# Patient Record
Sex: MALE | Race: WHITE | NOT HISPANIC OR LATINO | Employment: OTHER | URBAN - METROPOLITAN AREA
[De-identification: names, ages, dates, MRNs, and addresses within clinical notes are randomized per-mention and may not be internally consistent; named-entity substitution may affect disease eponyms.]

---

## 2017-11-27 ENCOUNTER — TELEPHONE (OUTPATIENT)
Dept: PAIN MEDICINE | Facility: CLINIC | Age: 57
End: 2017-11-27

## 2017-12-21 ENCOUNTER — OFFICE VISIT (OUTPATIENT)
Dept: PAIN MEDICINE | Facility: CLINIC | Age: 57
End: 2017-12-21
Payer: MEDICARE

## 2017-12-21 VITALS
TEMPERATURE: 98 F | RESPIRATION RATE: 20 BRPM | WEIGHT: 198.75 LBS | DIASTOLIC BLOOD PRESSURE: 80 MMHG | SYSTOLIC BLOOD PRESSURE: 150 MMHG | HEIGHT: 68 IN | BODY MASS INDEX: 30.12 KG/M2 | HEART RATE: 84 BPM

## 2017-12-21 DIAGNOSIS — Z79.891 OPIOID CONTRACT EXISTS: ICD-10-CM

## 2017-12-21 DIAGNOSIS — M51.36 DDD (DEGENERATIVE DISC DISEASE), LUMBAR: ICD-10-CM

## 2017-12-21 DIAGNOSIS — I10 HYPERTENSION, UNSPECIFIED TYPE: ICD-10-CM

## 2017-12-21 DIAGNOSIS — Z72.0 TOBACCO USE: ICD-10-CM

## 2017-12-21 DIAGNOSIS — M50.30 DDD (DEGENERATIVE DISC DISEASE), CERVICAL: Primary | ICD-10-CM

## 2017-12-21 DIAGNOSIS — M54.12 CERVICAL RADICULOPATHY: ICD-10-CM

## 2017-12-21 PROCEDURE — 80307 DRUG TEST PRSMV CHEM ANLYZR: CPT

## 2017-12-21 PROCEDURE — 99999 PR PBB SHADOW E&M-EST. PATIENT-LVL IV: CPT | Mod: PBBFAC,,, | Performed by: ANESTHESIOLOGY

## 2017-12-21 PROCEDURE — 99204 OFFICE O/P NEW MOD 45 MIN: CPT | Mod: S$GLB,,, | Performed by: ANESTHESIOLOGY

## 2017-12-21 RX ORDER — HYDROCODONE BITARTRATE AND ACETAMINOPHEN 7.5; 325 MG/1; MG/1
1 TABLET ORAL EVERY 12 HOURS PRN
Qty: 60 TABLET | Refills: 0 | Status: SHIPPED | OUTPATIENT
Start: 2017-12-21 | End: 2018-01-19

## 2017-12-21 NOTE — PROGRESS NOTES
This note was completed with dictation software and grammatical errors may exist.    CC: Arm pain, neck pain, back pain    HPI: The patient is a 57-year-old man with hypertension, gout, chronic tobacco use who presents in self-referral for neck pain radiating into the left arm and bilateral low back pain.  He reports having neck pain for many years but it is in the last 6 months to 1 year that the pain has become worse to the point where he has sought consultation with a spine surgeon.  The pain is located in the midline neck, radiates into the scapula and extends throughout the entire left arm and into the hand with numbness, tingling and weakness at times.  He states that this is constant worse with standing and walking worse with turning his head.  He has been taking Neurontin 300 mg 3 times a day without relief.  He has seen Dr. Jalloh, spine surgeon and apparently is scheduled for surgery in February, 2018.  He denies any bowel or bladder incontinence.      His other complaint is low back pain, radiates across the bilateral low back, denies any radiation into the legs.  This is constant worse with walking but even with sitting too long.  He denies any weakness in the legs, no numbness or tingling.  He states that Dr. Jalolh explained that he would likely need to have what sounds like a fusion but is not willing to do this because of his smoking history.     Pain intervention history: He has done physical therapy several times without relief.  The patient had previously been seen by Dr. Alfonso Luciano and had received hydrocodone on a regular basis.  However, when the patient lost his insurance he ended up seeing 2 separate physicians who've charge cash only and was receiving 30 mg oxycodone and Opana in various doses.  Unclear why he is no longer seeing them but financial reasons may be a concern.  Patient states that he rarely leaves the house now.    ROS: He reports headaches, runny nose, appetite  "change, joint stiffness, back pain, difficulty sleeping, anxiety, depression and loss of balance.  Balance of review of systems is negative.    Past Medical History:   Diagnosis Date    Gout     Hyperlipidemia     Hypertension        Past Surgical History:   Procedure Laterality Date    SHOULDER ARTHROSCOPY Right        Social History     Social History    Marital status:      Spouse name: N/A    Number of children: N/A    Years of education: N/A     Social History Main Topics    Smoking status: Current Every Day Smoker     Packs/day: 2.00     Years: 30.00     Types: Cigarettes    Smokeless tobacco: None    Alcohol use Yes      Comment: social    Drug use: No    Sexual activity: Not Asked     Other Topics Concern    None     Social History Narrative    None         Medications/Allergies: See med card    Vitals:    12/21/17 0909   BP: (!) 150/80   Pulse: 84   Resp: 20   Temp: 97.8 °F (36.6 °C)   TempSrc: Oral   Weight: 90.2 kg (198 lb 11.9 oz)   Height: 5' 8" (1.727 m)   PainSc:   8   PainLoc: Arm         Physical exam:  Gen: A and O x3, pleasant, well-groomed, stale tobacco odor  Skin: No rashes or obvious lesions  HEENT: PERRLA, no obvious deformities on ears or in canals.Trachea midline.  CVS: Regular rate and rhythm, normal palpable pulses.  Resp: Clear to auscultation bilaterally, no wheezes or rales.  Abdomen: Soft, NT/ND.  Musculoskeletal:  No antalgic gait.     Neuro:  Upper extremities: 5/5 strength bilaterally   Reflexes: Brachioradialis 1+, Bicep 2+, Tricep 2+.   Sensory: Intact and symmetrical to light touch and pinprick in C2-T1 dermatomes bilaterally, except for left hand hypoesthesia.    Cervical Spine:  Cervical spine: Range of motion is mildly reduced with flexion with increased pain, moderately reduced with extension and oblique extension either side, lateral rotation to either side causing increased pain.    Spurling's maneuver causes neck pain to either side.  Myofascial " exam: No Tenderness to palpation across cervical paraspinous region bilaterally.    Neuro:  Lower extremities: 5/5 strength bilaterally  Reflexes: Patellar 0+, Achilles 0+ bilaterally.  Sensory:  Intact and symmetrical to light touch and pinprick in L2-S1 dermatomes bilaterally.    Lumbar spine:  Lumbar spine: Range of motion is moderately reduced with flexion with increased pain, severely reduced with extension with increased pain bilateral lumbar region.  Zion's test causes no increased pain on either side.    Supine straight leg raise is negative bilaterally.    Internal and external rotation of the hip causes no increased pain on either side.  Myofascial exam: No tenderness to palpation across lumbar paraspinous muscles.      Imagin17 CT Cspine  There multilevel cervical disc bulges and disc herniations.  No significant includes a calcified disc herniation at C5-C6 to the right of midline with severe right C5-C6 foraminal stenosis.  Loss of cervical disc space height at all levels throughout the cervical spine with marginal osteophytes present.  No acute displaced fracture or dislocation noted.  Hypertrophic facet arthrosis at all levels throughout the cervical spine.  A review of the paraspinous soft tissues otherwise demonstrates nothing usual.    MRI report 17: [facility]  At C2/3 there is central disc herniation posteriorly extending 2.8 mm posterior to the margin of the vertebral column.  There is no neural foraminal stenosis.  At C3/4 there is an annular bulge and the AP diameter of the canal is 7.4 mm.  There is bilateral uncinate spurring with moderate narrowing of the neural foramen bilaterally.  At C3/4 there is diffuse disc herniation and protrusion with AP diameter of the canal 7.7 mm.  There is bilateral uncinate spurring with moderate foraminal narrowing bilaterally.  At C5/6 there is a right sided disc herniation resulting in moderate effacement of the right  anterolateral aspect of the thecal sac.  The dimension of the canal is 9.4 mm.  There is bilateral uncinate spurring with severe narrowing of the neural foramen on the right and moderate to severe narrowing of the neural foramen on the left.  At C6/7 there is diffuse disc herniation and protrusion with AP dimension of the central spinal canal 9.4 mm.  There is bilateral uncinate spurring with moderate to severe narrowing of the neural foramen bilaterally.  At C7/T1 there is no focal disc herniation or protrusion.  There is no central spinal canal nor neural foraminal stenosis.    MRI lumbar spine report 3/31/17 [facility]: L1/2 through L3/4 is fairly normal.  At L4/5 demonstrates moderate to severe disc space narrowing, moderate disc bulging, mild foraminal narrowing bilaterally left greater than right with extra foraminal nerve root contact on the left.  At L5/S1 there is moderate severe disc space narrowing, moderate disc bulge, superimposed desiccated chronic appearing central disc protrusion, mild bilateral recess narrowing and contact of the descending S1 nerve roots bilaterally but without visible nerve root impingement extra foraminal nerve root contact bilaterally left greater than right.    Bilateral SI joint MRI 3/31/17: Normal exam.    Assessment:  The patient is a 57-year-old man with hypertension, gout, chronic tobacco use who presents in self-referral for neck pain radiating into the left arm and bilateral low back pain.     1. DDD (degenerative disc disease), cervical  [facility] Drug Screen   2. Opioid contract exists     3. Tobacco use     4. Hypertension, unspecified type     5. DDD (degenerative disc disease), lumbar     6. Cervical radiculopathy         Plan:  1.  He has an upcoming cervical surgery, was told to find a pain management physician to help control pain until his surgery.  The patient does have a history of high-dose opioid usage and I explained my philosophy that we would not  be titrated upward to eliminate his pain, just try to provide him with some medication to tolerate for now.  In terms of medication, I have given him a prescription for hydrocodone 7.5/325 to take up to 2 times a day.  2.  I will get records from his previous physicians that I can see what therapies and treatments have been tried in the past.  Hopefully he has some relief with his neck pain and we can then address some of his back issues.  I explained that the goal would be to decrease medication usage and to increase his functioning.  3.  We had a long discussion about tobacco cessation, explained the risks involved with poor healing and infection after surgery especially a fusion surgery.  I have offered to give him resources to quit, he states that he is going to talk to his primary care physician about this.  4.  I've had him sign an opioid agreement and complete a urine drug screen.  We will have him follow-up in 3 weeks or sooner as needed.

## 2017-12-26 LAB

## 2018-01-09 ENCOUNTER — TELEPHONE (OUTPATIENT)
Dept: PAIN MEDICINE | Facility: CLINIC | Age: 58
End: 2018-01-09

## 2018-01-09 NOTE — TELEPHONE ENCOUNTER
Received a release of information form to be completed from [employer]. They will not accept the release request faxed by [facility] and requested a different form to be completed. This has been mailed to the patient to complete.

## 2018-01-19 ENCOUNTER — OFFICE VISIT (OUTPATIENT)
Dept: PAIN MEDICINE | Facility: CLINIC | Age: 58
End: 2018-01-19
Payer: MEDICARE

## 2018-01-19 VITALS
SYSTOLIC BLOOD PRESSURE: 150 MMHG | RESPIRATION RATE: 20 BRPM | WEIGHT: 199.75 LBS | DIASTOLIC BLOOD PRESSURE: 80 MMHG | TEMPERATURE: 98 F | HEART RATE: 80 BPM | BODY MASS INDEX: 30.37 KG/M2

## 2018-01-19 DIAGNOSIS — M54.16 LEFT LUMBAR RADICULOPATHY: Primary | ICD-10-CM

## 2018-01-19 DIAGNOSIS — M54.12 CERVICAL RADICULOPATHY: ICD-10-CM

## 2018-01-19 DIAGNOSIS — Z72.0 TOBACCO USE: ICD-10-CM

## 2018-01-19 DIAGNOSIS — M51.36 DDD (DEGENERATIVE DISC DISEASE), LUMBAR: ICD-10-CM

## 2018-01-19 DIAGNOSIS — M50.30 DDD (DEGENERATIVE DISC DISEASE), CERVICAL: ICD-10-CM

## 2018-01-19 PROCEDURE — 99999 PR PBB SHADOW E&M-EST. PATIENT-LVL V: CPT | Mod: PBBFAC,,, | Performed by: ANESTHESIOLOGY

## 2018-01-19 PROCEDURE — 99213 OFFICE O/P EST LOW 20 MIN: CPT | Mod: S$GLB,,, | Performed by: ANESTHESIOLOGY

## 2018-01-19 RX ORDER — ALPRAZOLAM 0.5 MG/1
1 TABLET, ORALLY DISINTEGRATING ORAL ONCE AS NEEDED
Status: CANCELLED | OUTPATIENT
Start: 2018-01-31 | End: 2018-01-31

## 2018-01-19 RX ORDER — LIDOCAINE HYDROCHLORIDE 10 MG/ML
1 INJECTION, SOLUTION EPIDURAL; INFILTRATION; INTRACAUDAL; PERINEURAL ONCE
Status: DISCONTINUED | OUTPATIENT
Start: 2018-01-19 | End: 2018-03-29

## 2018-01-19 RX ORDER — HYDROCODONE BITARTRATE AND ACETAMINOPHEN 10; 325 MG/1; MG/1
1 TABLET ORAL 3 TIMES DAILY PRN
Qty: 90 TABLET | Refills: 0 | Status: SHIPPED | OUTPATIENT
Start: 2018-01-19 | End: 2018-02-20 | Stop reason: SDUPTHER

## 2018-01-19 NOTE — PROGRESS NOTES
This note was completed with dictation software and grammatical errors may exist.    CC: Arm pain, neck pain, back pain    HPI: The patient is a 57-year-old man with hypertension, gout, chronic tobacco use who presents in self-referral for neck pain radiating into the left arm and bilateral low back pain.  He returns in follow-up today for neck pain radiating into the left arm, low back pain radiating into the bilateral legs.  We did not obtain records from his previous pain management physician, the medical records release form was not accepted.  He states that he is currently scheduled to undergo cervical spine surgery at the end of February, 2018 with Dr. Jalloh.  He continues to have severe left arm pain that causes him difficulty with holding objects, lifting anything heavy.    His other complaint is low back pain radiating into the bilateral legs.  I was able to open up his MRI from 3/31/17 which does show a disc protrusion at L5/S1 centrally slightly more to the left than the right side, his pain is worse into the left side.  He also has significant facet arthropathy at L3/4, L4/5 and L5/S1.     Pain intervention history: He has done physical therapy several times without relief.  The patient had previously been seen by Dr. Alfonso Luciano and had received hydrocodone on a regular basis.  However, when the patient lost his insurance he ended up seeing 2 separate physicians who've charge cash only and was receiving 30 mg oxycodone and Opana in various doses.  Unclear why he is no longer seeing them but financial reasons may be a concern.  Patient states that he rarely leaves the house now.    ROS: He reports headaches, runny nose, appetite change, joint stiffness, back pain, difficulty sleeping, anxiety, depression and loss of balance.  Balance of review of systems is negative.    Past Medical History:   Diagnosis Date    Gout     Hyperlipidemia     Hypertension        Past Surgical History:   Procedure  Laterality Date    SHOULDER ARTHROSCOPY Right        Social History     Social History    Marital status:      Spouse name: N/A    Number of children: N/A    Years of education: N/A     Social History Main Topics    Smoking status: Current Every Day Smoker     Packs/day: 2.00     Years: 30.00     Types: Cigarettes    Smokeless tobacco: None    Alcohol use Yes      Comment: social    Drug use: No    Sexual activity: Not Asked     Other Topics Concern    None     Social History Narrative    None         Medications/Allergies: See med card    Vitals:    01/19/18 1035   BP: (!) 150/80   Pulse: 80   Resp: 20   Temp: 97.6 °F (36.4 °C)   TempSrc: Oral   Weight: 90.6 kg (199 lb 11.8 oz)   PainSc:   8   PainLoc: Back         Physical exam:  Gen: A and O x3, pleasant, well-groomed, stale tobacco odor  Skin: No rashes or obvious lesions  HEENT: PERRLA, no obvious deformities on ears or in canals.Trachea midline.  CVS: Regular rate and rhythm, normal palpable pulses.  Resp: Clear to auscultation bilaterally, no wheezes or rales.  Abdomen: Soft, NT/ND.  Musculoskeletal:  No antalgic gait.     Neuro:  Upper extremities: 5/5 strength bilaterally   Reflexes: Brachioradialis 1+, Bicep 2+, Tricep 2+.   Sensory: Intact and symmetrical to light touch and pinprick in C2-T1 dermatomes bilaterally, except for left hand hypoesthesia.    Cervical Spine:  Cervical spine: Range of motion is mildly reduced with flexion with increased pain, moderately reduced with extension and oblique extension either side, lateral rotation to either side causing increased pain.    Spurling's maneuver causes neck pain to either side.  Myofascial exam: No Tenderness to palpation across cervical paraspinous region bilaterally.    Neuro:  Lower extremities: 5/5 strength bilaterally  Reflexes: Patellar 0+, Achilles 0+ bilaterally.  Sensory:  Intact and symmetrical to light touch and pinprick in L2-S1 dermatomes bilaterally.    Lumbar  spine:  Lumbar spine: Range of motion is moderately reduced with flexion with increased pain, severely reduced with extension with increased pain bilateral lumbar region.  Zion's test causes no increased pain on either side.    Supine straight leg raise is negative bilaterally.    Internal and external rotation of the hip causes no increased pain on either side.  Myofascial exam: No tenderness to palpation across lumbar paraspinous muscles.      Imagin17 CT Cspine  There multilevel cervical disc bulges and disc herniations.  No significant includes a calcified disc herniation at C5-C6 to the right of midline with severe right C5-C6 foraminal stenosis.  Loss of cervical disc space height at all levels throughout the cervical spine with marginal osteophytes present.  No acute displaced fracture or dislocation noted.  Hypertrophic facet arthrosis at all levels throughout the cervical spine.  A review of the paraspinous soft tissues otherwise demonstrates nothing usual.    MRI report 17: [facility].  At C2/3 there is central disc herniation posteriorly extending 2.8 mm posterior to the margin of the vertebral column.  There is no neural foraminal stenosis.  At C3/4 there is an annular bulge and the AP diameter of the canal is 7.4 mm.  There is bilateral uncinate spurring with moderate narrowing of the neural foramen bilaterally.  At C3/4 there is diffuse disc herniation and protrusion with AP diameter of the canal 7.7 mm.  There is bilateral uncinate spurring with moderate foraminal narrowing bilaterally.  At C5/6 there is a right sided disc herniation resulting in moderate effacement of the right anterolateral aspect of the thecal sac.  The dimension of the canal is 9.4 mm.  There is bilateral uncinate spurring with severe narrowing of the neural foramen on the right and moderate to severe narrowing of the neural foramen on the left.  At C6/7 there is diffuse disc herniation and protrusion  with AP dimension of the central spinal canal 9.4 mm.  There is bilateral uncinate spurring with moderate to severe narrowing of the neural foramen bilaterally.  At C7/T1 there is no focal disc herniation or protrusion.  There is no central spinal canal nor neural foraminal stenosis.    MRI lumbar spine report 3/31/17 [facility]: L1/2 through L3/4 is fairly normal.  At L4/5 demonstrates moderate to severe disc space narrowing, moderate disc bulging, mild foraminal narrowing bilaterally left greater than right with extra foraminal nerve root contact on the left.  At L5/S1 there is moderate severe disc space narrowing, moderate disc bulge, superimposed desiccated chronic appearing central disc protrusion, mild bilateral recess narrowing and contact of the descending S1 nerve roots bilaterally but without visible nerve root impingement extra foraminal nerve root contact bilaterally left greater than right.    Bilateral SI joint MRI 3/31/17: Normal exam.    Assessment:  The patient is a 57-year-old man with hypertension, gout, chronic tobacco use who presents in self-referral for neck pain radiating into the left arm and bilateral low back pain.     1. Left lumbar radiculopathy  Place in Outpatient    Vital signs    Activity as tolerated    Insert peripheral IV    lidocaine (PF) 10 mg/ml (1%) injection 10 mg    Verify informed consent    Notify physician     Notify physician     Notify physician (specify)    Diet NPO    POCT urine pregnancy    POCT glucose    Case Request Operating Room: INJECTION-STEROID-EPIDURAL-LUMBAR L5/S1    alprazolam ODT dissolvable tablet 1 mg    Insert peripheral IV    POCT glucose   2. DDD (degenerative disc disease), cervical     3. DDD (degenerative disc disease), lumbar     4. Cervical radiculopathy     5. Tobacco use         Plan:  1.  For his low back pain radiating into the legs, I think it would be worth trying a midline L5/S1 YARITZA to see if this provides benefit of the back and leg  pain.  I will have him follow-up in several weeks and I explained that there is not much that we can do for his neck right now, he needs to proceed with surgery.  He requested more pain medication, I had given him a prescription for 7.5/325 hydrocodone twice a day in his urine drug screening test was normal.  I will increase his pain medication up to hydrocodone 10/325 3 times daily.  I will have him follow-up in several weeks or sooner as needed.

## 2018-01-24 ENCOUNTER — TELEPHONE (OUTPATIENT)
Dept: PAIN MEDICINE | Facility: CLINIC | Age: 58
End: 2018-01-24

## 2018-01-24 NOTE — TELEPHONE ENCOUNTER
Patient can stop his aspirin for 7 days but he cannot stop plavix until after march 22nd per cardiologist. Please advise

## 2018-01-25 ENCOUNTER — TELEPHONE (OUTPATIENT)
Dept: SURGERY | Facility: HOSPITAL | Age: 58
End: 2018-01-25

## 2018-01-25 DIAGNOSIS — M51.36 DDD (DEGENERATIVE DISC DISEASE), LUMBAR: Primary | ICD-10-CM

## 2018-01-25 NOTE — TELEPHONE ENCOUNTER
Received phone call today from GENNARO Hair MA to reschedule this patient to 3/30/18. Please contact patient to choose another date. The surgery center is closed on 3/30/2018.

## 2018-02-20 ENCOUNTER — OFFICE VISIT (OUTPATIENT)
Dept: PAIN MEDICINE | Facility: CLINIC | Age: 58
End: 2018-02-20
Payer: MEDICARE

## 2018-02-20 ENCOUNTER — TELEPHONE (OUTPATIENT)
Dept: PAIN MEDICINE | Facility: CLINIC | Age: 58
End: 2018-02-20

## 2018-02-20 VITALS
WEIGHT: 202.94 LBS | HEART RATE: 88 BPM | TEMPERATURE: 98 F | RESPIRATION RATE: 20 BRPM | DIASTOLIC BLOOD PRESSURE: 90 MMHG | BODY MASS INDEX: 30.86 KG/M2 | SYSTOLIC BLOOD PRESSURE: 140 MMHG

## 2018-02-20 DIAGNOSIS — R26.81 GAIT INSTABILITY: ICD-10-CM

## 2018-02-20 DIAGNOSIS — M51.36 DDD (DEGENERATIVE DISC DISEASE), LUMBAR: Primary | ICD-10-CM

## 2018-02-20 DIAGNOSIS — M50.30 DDD (DEGENERATIVE DISC DISEASE), CERVICAL: ICD-10-CM

## 2018-02-20 DIAGNOSIS — Z79.891 OPIOID CONTRACT EXISTS: ICD-10-CM

## 2018-02-20 PROCEDURE — 99999 PR PBB SHADOW E&M-EST. PATIENT-LVL IV: CPT | Mod: PBBFAC,,, | Performed by: PHYSICIAN ASSISTANT

## 2018-02-20 PROCEDURE — 80307 DRUG TEST PRSMV CHEM ANLYZR: CPT

## 2018-02-20 PROCEDURE — 99215 OFFICE O/P EST HI 40 MIN: CPT | Mod: S$GLB,,, | Performed by: PHYSICIAN ASSISTANT

## 2018-02-20 PROCEDURE — 3008F BODY MASS INDEX DOCD: CPT | Mod: S$GLB,,, | Performed by: PHYSICIAN ASSISTANT

## 2018-02-20 RX ORDER — GABAPENTIN 300 MG/1
CAPSULE ORAL
Qty: 150 CAPSULE | Refills: 2 | Status: SHIPPED | OUTPATIENT
Start: 2018-02-20 | End: 2018-02-21 | Stop reason: SDUPTHER

## 2018-02-20 RX ORDER — HYDROCODONE BITARTRATE AND ACETAMINOPHEN 10; 325 MG/1; MG/1
1 TABLET ORAL 3 TIMES DAILY PRN
Qty: 90 TABLET | Refills: 0 | Status: SHIPPED | OUTPATIENT
Start: 2018-02-20 | End: 2018-03-22

## 2018-02-20 NOTE — TELEPHONE ENCOUNTER
----- Message from Marie Bonilla sent at 2/20/2018 11:16 AM CST -----  Contact: Patient  Adam, patient [phone] calling because he told office wrong pharmacy to have his prescriptions sent into, wants them sent to [facility] instead of [facility]. Please advise. Thanks.    [facility] [id number] [place] [id number]  Phone: [phone] Fax: [phone]

## 2018-02-20 NOTE — TELEPHONE ENCOUNTER
----- Message from Carlos Caba sent at 2/20/2018  8:26 AM CST -----  Contact: [facility]  Please send clinical notes and medical assistive and signed doctors order for rollator please fax to [phone] any questions please call back at [phone]

## 2018-02-21 RX ORDER — GABAPENTIN 300 MG/1
CAPSULE ORAL
Qty: 150 CAPSULE | Refills: 2 | Status: SHIPPED | OUTPATIENT
Start: 2018-02-21 | End: 2018-06-28

## 2018-02-22 ENCOUNTER — OFFICE VISIT (OUTPATIENT)
Dept: SPINE | Facility: CLINIC | Age: 58
End: 2018-02-22
Payer: MEDICARE

## 2018-02-22 VITALS
BODY MASS INDEX: 29.84 KG/M2 | HEIGHT: 68 IN | WEIGHT: 196.88 LBS | HEART RATE: 72 BPM | SYSTOLIC BLOOD PRESSURE: 144 MMHG | DIASTOLIC BLOOD PRESSURE: 90 MMHG

## 2018-02-22 DIAGNOSIS — M47.892 OTHER OSTEOARTHRITIS OF SPINE, CERVICAL REGION: ICD-10-CM

## 2018-02-22 DIAGNOSIS — M54.2 CERVICALGIA: ICD-10-CM

## 2018-02-22 DIAGNOSIS — M51.36 DEGENERATION OF LUMBAR INTERVERTEBRAL DISC: Primary | ICD-10-CM

## 2018-02-22 DIAGNOSIS — Z72.0 TOBACCO USE: ICD-10-CM

## 2018-02-22 DIAGNOSIS — G89.29 CHRONIC BILATERAL LOW BACK PAIN, WITH SCIATICA PRESENCE UNSPECIFIED: ICD-10-CM

## 2018-02-22 DIAGNOSIS — M54.5 CHRONIC BILATERAL LOW BACK PAIN, WITH SCIATICA PRESENCE UNSPECIFIED: ICD-10-CM

## 2018-02-22 PROCEDURE — 99999 PR PBB SHADOW E&M-EST. PATIENT-LVL V: CPT | Mod: PBBFAC,,, | Performed by: PHYSICIAN ASSISTANT

## 2018-02-22 PROCEDURE — 3008F BODY MASS INDEX DOCD: CPT | Mod: S$GLB,,, | Performed by: PHYSICIAN ASSISTANT

## 2018-02-22 PROCEDURE — 99204 OFFICE O/P NEW MOD 45 MIN: CPT | Mod: S$GLB,,, | Performed by: PHYSICIAN ASSISTANT

## 2018-02-22 NOTE — PROGRESS NOTES
"This note was completed with dictation software and grammatical errors may exist.    CC: Arm pain, neck pain, back pain    HPI: The patient is a 57-year-old man with hypertension, gout, chronic tobacco use who presents in self-referral for neck pain radiating into the left arm and bilateral low back pain.  He returns in follow-up today with low back and neck pain.  The patient is new to me.  He complains of pinching low back pain greater than neck pain.  His low back pain radiates to his legs and his neck pain radiates to his left arm.  His pain is worse with walking, bending and activity.  He has improvement with sitting and pain medication.  He admits to running out of his medicine one week ago.  He states "3 little pills are not enough."  He was planning on having a lumbar YARITZA but this was postponed due to incorrect information about his stents.  He states that his stents were inserted in January and shows me the date on his card.  This was done by Dr. Fabiola Youssef at [facility].  He states that his neck surgery was postponed by Dr. Jalloh for the same reason and he is frustrated with Dr. Jalloh's office and wants to see a neurosurgeon here.  He no longer wants any injections because he reports having a lumbar and cervical YARITZA on 10/16/17 with Dr. Luciano without any relief.  He complains of weakness in his left arm and legs.  He denies any significant numbness, bladder or bowel incontinence.  He also complains of some pain in the left fifth MCP joint for the past month.     Pain intervention history: He has done physical therapy several times without relief.  The patient had previously been seen by Dr. Alfonso Luciano and had received hydrocodone on a regular basis.  However, when the patient lost his insurance he ended up seeing 2 separate physicians who've charge cash only and was receiving 30 mg oxycodone and Opana in various doses.  Unclear why he is no longer seeing them but financial reasons " may be a concern.  Patient states that he rarely leaves the house now.    ROS: He reports headaches, runny nose, appetite change, joint stiffness, back pain, difficulty sleeping, anxiety, depression and loss of balance.  Balance of review of systems is negative.    Past Medical History:   Diagnosis Date    Gout     Hyperlipidemia     Hypertension        Past Surgical History:   Procedure Laterality Date    SHOULDER ARTHROSCOPY Right        Social History     Social History    Marital status:      Spouse name: N/A    Number of children: N/A    Years of education: N/A     Social History Main Topics    Smoking status: Current Every Day Smoker     Packs/day: 2.00     Years: 30.00     Types: Cigarettes    Smokeless tobacco: None    Alcohol use Yes      Comment: social    Drug use: No    Sexual activity: Not Asked     Other Topics Concern    None     Social History Narrative    None         Medications/Allergies: See med card    Vitals:    02/20/18 0950   BP: (!) 140/90   Pulse: 88   Resp: 20   Temp: 97.7 °F (36.5 °C)   TempSrc: Oral   Weight: 92 kg (202 lb 14.9 oz)   PainSc:   8   PainLoc: Back         Physical exam:  Gen: A and O x3, pleasant, well-groomed, stale tobacco odor  Skin: No rashes or obvious lesions  HEENT: PERRLA, no obvious deformities on ears or in canals.Trachea midline.  CVS: Regular rate and rhythm, normal palpable pulses.  Resp: Clear to auscultation bilaterally, no wheezes or rales.  Abdomen: Soft, NT/ND.  Musculoskeletal: Slow cautious gait    Neuro:  Upper extremities: 5/5 strength bilaterally   Lower extremities: 5/5 strength bilaterally  Reflexes: Brachioradialis 1+, Bicep 2+, Tricep 2+. Patellar 0+, Achilles 0+ bilaterally.  Sensory: Intact and symmetrical to light touch and pinprick in C2-T1 dermatomes bilaterally, except for left hand hypoesthesia. Intact and symmetrical to light touch and pinprick in L2-S1 dermatomes bilaterally.    Cervical Spine:  Cervical spine: Range  of motion is mildly reduced with flexion with increased pain, moderately reduced with extension and lateral rotation to either side causing increased pain.    Spurling's maneuver causes neck pain to either side.  Myofascial exam: No Tenderness to palpation across cervical paraspinous region bilaterally.    Lumbar spine:  Lumbar spine: Range of motion is moderately reduced with flexion with increased pain, severely reduced with extension with increased pain bilateral lumbar region.  Zion's test causes no increased pain on either side.    Supine straight leg raise is negative bilaterally.    Internal and external rotation of the hip causes no increased pain on either side.  Myofascial exam: No tenderness to palpation across lumbar paraspinous muscles.      Imagin17 CT Cspine  There multilevel cervical disc bulges and disc herniations.  No significant includes a calcified disc herniation at C5-C6 to the right of midline with severe right C5-C6 foraminal stenosis.  Loss of cervical disc space height at all levels throughout the cervical spine with marginal osteophytes present.  No acute displaced fracture or dislocation noted.  Hypertrophic facet arthrosis at all levels throughout the cervical spine.  A review of the paraspinous soft tissues otherwise demonstrates nothing usual.    MRI report 17: [facility].  At C2/3 there is central disc herniation posteriorly extending 2.8 mm posterior to the margin of the vertebral column.  There is no neural foraminal stenosis.  At C3/4 there is an annular bulge and the AP diameter of the canal is 7.4 mm.  There is bilateral uncinate spurring with moderate narrowing of the neural foramen bilaterally.  At C3/4 there is diffuse disc herniation and protrusion with AP diameter of the canal 7.7 mm.  There is bilateral uncinate spurring with moderate foraminal narrowing bilaterally.  At C5/6 there is a right sided disc herniation resulting in moderate effacement  of the right anterolateral aspect of the thecal sac.  The dimension of the canal is 9.4 mm.  There is bilateral uncinate spurring with severe narrowing of the neural foramen on the right and moderate to severe narrowing of the neural foramen on the left.  At C6/7 there is diffuse disc herniation and protrusion with AP dimension of the central spinal canal 9.4 mm.  There is bilateral uncinate spurring with moderate to severe narrowing of the neural foramen bilaterally.  At C7/T1 there is no focal disc herniation or protrusion.  There is no central spinal canal nor neural foraminal stenosis.    MRI lumbar spine report 3/31/17 [facility] MRI: L1/2 through L3/4 is fairly normal.  At L4/5 demonstrates moderate to severe disc space narrowing, moderate disc bulging, mild foraminal narrowing bilaterally left greater than right with extra foraminal nerve root contact on the left.  At L5/S1 there is moderate severe disc space narrowing, moderate disc bulge, superimposed desiccated chronic appearing central disc protrusion, mild bilateral recess narrowing and contact of the descending S1 nerve roots bilaterally but without visible nerve root impingement extra foraminal nerve root contact bilaterally left greater than right.    Bilateral SI joint MRI 3/31/17: Normal exam.    Assessment:  The patient is a 57-year-old man with hypertension, gout, chronic tobacco use who presents in self-referral for neck pain radiating into the left arm and bilateral low back pain.     1. DDD (degenerative disc disease), lumbar  Ambulatory referral to Neurosurgery   2. DDD (degenerative disc disease), cervical  Ambulatory referral to Neurosurgery   3. Gait instability     4. Opioid contract exists  Pain Clinic Drug Screen       Plan:  1.  I have ordered a rolling walker with a seat for the patient's gait instability to help prevent falls.  2.  He no longer wants to see Dr. Jalloh so I have placed a referral to our neurosurgery department.   He was planning on having cervical spine surgery with Dr. Jalloh and wants to have lumbar surgery as well.  3.  Dr. Paz provided a prescription for hydrocodone-acetaminophen 10/325 mg up to 3 times a day as needed for pain.  A urine drug screen was completed today.  I anticipate this to be negative for the medication because the patient told me he ran out of his medicine about one week ago and has not been taking anything.  I have discussed this case with Dr. Paz and if the patient runs out of his medication again early we will no longer provide controlled substances to the patient.  4.  Follow-up in one month or sooner as needed.    Greater than 50% of this 40 minute visit was spent on counseling the patient.

## 2018-02-22 NOTE — LETTER
February 26, 2018      Bucky Paz MD  [address] [facility] [address] [place] [address]           [facility]  [address] [facility] [address]  [facility] [address]  Phone: [phone]  Fax: [phone]          Patient: Adam Persaud   MR Number: [id number]   YOB: 1960   Date of Visit: 2/22/2018       Dear Dr. Bucky Paz:    Thank you for referring Adam Persaud to me for evaluation. Attached you will find relevant portions of my assessment and plan of care.    If you have questions, please do not hesitate to call me. I look forward to following Adam Persaud along with you.    Sincerely,    Karen Carr PA-C    Enclosure  CC:  No Recipients    If you would like to receive this communication electronically, please contact [email or web address] or [phone] to request more information on [employer] Link access.    For providers and/or their staff who would like to refer a patient to [facility], please contact us through our one-stop-shop provider referral line, [facility] [place], at [phone].    If you feel you have received this communication in error or would no longer like to receive these types of communications, please e-mail [email or web address]

## 2018-02-25 LAB

## 2018-02-26 ENCOUNTER — DOCUMENTATION ONLY (OUTPATIENT)
Dept: PAIN MEDICINE | Facility: CLINIC | Age: 58
End: 2018-02-26

## 2018-02-26 NOTE — PROGRESS NOTES
Neurosurgery History & Physical    Patient ID: Adam Persaud is a 57 y.o. male.    Chief Complaint   Patient presents with    Low-back Pain     Has had pain for years. Pain radiates down back of both legs. Lying down for short periods makes pain better. Bending down makes pain worse.       Review of Systems   Constitutional: Negative for activity change, chills, fatigue and unexpected weight change.   HENT: Negative for hearing loss, tinnitus, trouble swallowing and voice change.    Eyes: Negative for visual disturbance.   Respiratory: Negative for apnea, chest tightness and shortness of breath.    Cardiovascular: Negative for chest pain and palpitations.   Gastrointestinal: Negative for abdominal pain, constipation, diarrhea, nausea and vomiting.   Genitourinary: Negative for difficulty urinating, dysuria and frequency.   Musculoskeletal: Positive for back pain and neck pain. Negative for gait problem and neck stiffness.   Skin: Negative for wound.   Neurological: Positive for numbness. Negative for dizziness, tremors, seizures, facial asymmetry, speech difficulty, weakness, light-headedness and headaches.   Psychiatric/Behavioral: Negative for confusion and decreased concentration.       Past Medical History:   Diagnosis Date    Gout     Hyperlipidemia     Hypertension      Social History     Social History    Marital status:      Spouse name: N/A    Number of children: N/A    Years of education: N/A     Occupational History    Not on file.     Social History Main Topics    Smoking status: Current Every Day Smoker     Packs/day: 2.00     Years: 30.00     Types: Cigarettes    Smokeless tobacco: Not on file    Alcohol use Yes      Comment: social    Drug use: No    Sexual activity: Not on file     Other Topics Concern    Not on file     Social History Narrative    No narrative on file     No family history on file.  Review of patient's allergies indicates:  No Known Allergies    Current  "Outpatient Prescriptions:     allopurinol (ZYLOPRIM) 300 MG tablet, Take 300 mg by mouth once daily., Disp: , Rfl:     aspirin 325 MG tablet, Take 325 mg by mouth once daily., Disp: , Rfl:     clopidogrel (PLAVIX) 75 mg tablet, Take 75 mg by mouth once daily., Disp: , Rfl:     doxepin (SINEQUAN) 10 MG capsule, Take 10 mg by mouth nightly as needed (as needed for pain)., Disp: , Rfl:     gabapentin (NEURONTIN) 300 MG capsule, Take two capsules (600mg) in the morning and three capsules (900 mg) at night., Disp: 150 capsule, Rfl: 2    hydrocodone-acetaminophen 10-325mg (NORCO)  mg Tab, Take 1 tablet by mouth 3 (three) times daily as needed., Disp: 90 tablet, Rfl: 0    indomethacin (INDOCIN) 50 MG capsule, Take 50 mg by mouth 3 (three) times daily., Disp: , Rfl:     lidocaine (LIDODERM) 5 %, Place 1 patch onto the skin once daily., Disp: 10 patch, Rfl: 0    metoprolol succinate (TOPROL-XL) 50 MG 24 hr tablet, Take 50 mg by mouth once daily., Disp: , Rfl:     naloxegol (MOVANTIK) tablet, Take 25 mg by mouth once daily., Disp: , Rfl:     nitroGLYCERIN (NITROSTAT) 0.4 MG SL tablet, Place 0.4 mg under the tongue every 5 (five) minutes as needed for Chest pain., Disp: , Rfl:     pravastatin (PRAVACHOL) 40 MG tablet, Take 40 mg by mouth once daily., Disp: , Rfl:     losartan (COZAAR) 100 MG tablet, Take 1 tablet (100 mg total) by mouth once daily., Disp: 30 tablet, Rfl: 0    Current Facility-Administered Medications:     lidocaine (PF) 10 mg/ml (1%) injection 10 mg, 1 mL, Intradermal, Once, Bucky Paz MD    Vitals:    02/22/18 1336   BP: (!) 144/90   BP Location: Right arm   Patient Position: Sitting   BP Method: Medium (Manual)   Pulse: 72   Weight: 89.3 kg (196 lb 13.9 oz)   Height: 5' 8" (1.727 m)       Physical Exam   Constitutional: He is oriented to person, place, and time. He appears well-developed and well-nourished.   HENT:   Head: Normocephalic and atraumatic.   Eyes: Pupils are equal, " round, and reactive to light.   Neck: Normal range of motion. Neck supple.   Cardiovascular: Normal rate.    Pulmonary/Chest: Effort normal.   Abdominal: He exhibits no distension.   Musculoskeletal: Normal range of motion. He exhibits no edema.   Neurological: He is alert and oriented to person, place, and time. He has a normal Finger-Nose-Finger Test, a normal Heel to Shin Test, a normal Romberg Test and a normal Tandem Gait Test. Gait normal.   Reflex Scores:       Tricep reflexes are 2+ on the right side and 2+ on the left side.       Bicep reflexes are 2+ on the right side and 2+ on the left side.       Brachioradialis reflexes are 2+ on the right side and 2+ on the left side.       Patellar reflexes are 2+ on the right side and 2+ on the left side.       Achilles reflexes are 2+ on the right side and 2+ on the left side.  Skin: Skin is warm and dry.   Psychiatric: He has a normal mood and affect. His speech is normal and behavior is normal. Judgment and thought content normal.   Nursing note and vitals reviewed.      Neurologic Exam     Mental Status   Oriented to person, place, and time.   Oriented to person.   Oriented to place.   Oriented to time.   Follows 3 step commands.   Attention: normal. Concentration: normal.   Speech: speech is normal   Level of consciousness: alert  Knowledge: consistent with education.   Able to name object. Able to read. Able to repeat. Able to write. Normal comprehension.     Cranial Nerves     CN II   Visual acuity: normal  Right visual field deficit: none  Left visual field deficit: none     CN III, IV, VI   Pupils are equal, round, and reactive to light.  Right pupil: Size: 3 mm. Shape: regular. Reactivity: brisk. Consensual response: intact.   Left pupil: Size: 3 mm. Shape: regular. Reactivity: brisk. Consensual response: intact.   CN III: no CN III palsy  CN VI: no CN VI palsy  Nystagmus: none   Diplopia: none  Ophthalmoparesis: none  Conjugate gaze: present    CN V    Right facial sensation deficit: none  Left facial sensation deficit: none    CN VII   Right facial weakness: none  Left facial weakness: none    CN VIII   Hearing: intact    CN IX, X   CN IX normal.   CN X normal.     CN XI   Right sternocleidomastoid strength: normal  Left sternocleidomastoid strength: normal  Right trapezius strength: normal  Left trapezius strength: normal    CN XII   Fasciculations: absent  Tongue deviation: none    Motor Exam   Muscle bulk: normal  Overall muscle tone: normal  Right arm pronator drift: absent  Left arm pronator drift: absent    Strength   Right neck flexion: 5/5  Left neck flexion: 5/5  Right neck extension: 5/5  Left neck extension: 5/5  Right deltoid: 5/5  Left deltoid: 5/5  Right biceps: 5/5  Left biceps: 5/5  Right triceps: 5/5  Left triceps: 5/5  Right wrist flexion: 5/5  Left wrist flexion: 5/5  Right wrist extension: 5/5  Left wrist extension: 5/5  Right interossei: 5/5  Left interossei: 5/5  Right abdominals: 5/5  Left abdominals: 5/5  Right iliopsoas: 5/5  Left iliopsoas: 5/5  Right quadriceps: 5/5  Left quadriceps: 5/5  Right hamstrin/5  Left hamstrin/5  Right glutei: 5/5  Left glutei: 5/5  Right anterior tibial: 5/5  Left anterior tibial: 5/5  Right posterior tibial: 5/5  Left posterior tibial: 5/5  Right peroneal: 5/5  Left peroneal: 5/5  Right gastroc: 5/5  Left gastroc: 5/5    Sensory Exam   Right arm light touch: normal  Left arm light touch: normal  Right leg light touch: normal  Left leg light touch: normal  Right arm vibration: normal  Left arm vibration: normal  Right arm pinprick: normal  Left arm pinprick: normal    Gait, Coordination, and Reflexes     Gait  Gait: normal    Coordination   Romberg: negative  Finger to nose coordination: normal  Heel to shin coordination: normal  Tandem walking coordination: normal    Tremor   Resting tremor: absent  Intention tremor: absent  Action tremor: absent    Reflexes   Right brachioradialis: 2+  Left  brachioradialis: 2+  Right biceps: 2+  Left biceps: 2+  Right triceps: 2+  Left triceps: 2+  Right patellar: 2+  Left patellar: 2+  Right achilles: 2+  Left achilles: 2+  Right Aviles: absent  Left Aviles: absent  Right ankle clonus: absent  Left ankle clonus: absent      Provider dictation:  57 year old male smoker with history of chronic neck/ back pain is referred by Dr. Paz for further evaluation of back pain.  He states he was previously evaluated by Dr. Jalloh ([place]) and was scheduled for surgery today with him, but it was cancelled per the patient due to his cardiologist not giving a correct date of a cardiac procedure.  Patient states he wants a second opinion with a surgeon at this time and would like all of his care to be performed at [facility].    He has chronic neck and lower back pain.  Neck pain has been felt in the interscapular area to the left arm and associated with numbness.  Overall neck pain has improved at this time.  He is most concerned about lower back pain, which he had for years.  It is constant and getting progressively worse.  Pain is felt across the lower back into the bilateral buttocks and posterior thighs when leaning forward, bending or walking.  He denies any further radicular leg pain.  He has not had PT and refuses to consider it.  He is scheduled for YARITZA with Dr. Paz on 3-29-18.  He recalls cervical YARITZA in the past with  Dr. Luciano.  Oswestry score: 58%.  PHQ:  10.    He is neurologically intact on exam.    I have reviewed an MRI cervical spine from [facility] dated 8-24-17 demonstrating multi level cervical spondylosis.  C3/4 broad based disk with mild bilateral foraminal narrowing.  C4/5 broad based disk/ osteophyte with significant right greater than left foraminal narrowing.  C5/6 right foraminal disk with right foraminal narrowing.  And C6/7 broad based disk with bilateral foraminal narrowing.    I have reviewed an MRI lumbar spine from [facility] dated  3-31-17 demonstrating degenerative disk desiccation t L4/5 and L5/S1.  At L5/S1 there is a central/ left paracentral disk hernia with left greater than right lateral recess stenosis.  The study is limited due ot poor image quality overall.    Mr. Persaud has chronic neck and lower back pain.  He has cervical spondylosis at multiple levels that is asymptomatic at this time as cervical spine pain seems to have resolved/ improved.  I have encouraged stretching and good posture to help prevent recurrence of neck pain.  He has chronic lower back pain greater than bilateral posterior thigh pain in a non-focal dermatomal distribution.  He does have central/ left paracentral disk hernia at L5/S1, but without a focal S1 radiculopathy.  He is anxious to consider surgery as he previously scheduled with a  Different surgeon.  Prior to having him see a surgeon here, we will update his imaging and obtain better quality studies - MRI lumbar spine and xrays of the lumbar spin.  He wants to see a surgeon and therefore we will arrange for him to follow up with Dr. Dobson.    Visit Diagnosis:  Degeneration of lumbar intervertebral disc  -     MRI Lumbar Spine Without Contrast; Future; Expected date: 02/22/2018  -     X-Ray Lumbar Complete With Flex And Ext; Future; Expected date: 02/22/2018    Chronic bilateral low back pain, with sciatica presence unspecified  -     MRI Lumbar Spine Without Contrast; Future; Expected date: 02/22/2018  -     X-Ray Lumbar Complete With Flex And Ext; Future; Expected date: 02/22/2018    Cervicalgia    Other osteoarthritis of spine, cervical region    Tobacco use        Total time spent counseling greater than fifty percent of total visit time.  Counseling included discussion regarding imaging findings, diagnosis possibilities, treatment options, risks and benefits.   The patient had many questions regarding the options and long-term effects.

## 2018-03-03 ENCOUNTER — HOSPITAL ENCOUNTER (OUTPATIENT)
Dept: RADIOLOGY | Facility: HOSPITAL | Age: 58
Discharge: HOME | End: 2018-03-03
Attending: PHYSICIAN ASSISTANT
Payer: MEDICARE

## 2018-03-03 DIAGNOSIS — M51.36 DEGENERATION OF LUMBAR INTERVERTEBRAL DISC: ICD-10-CM

## 2018-03-03 DIAGNOSIS — G89.29 CHRONIC BILATERAL LOW BACK PAIN, WITH SCIATICA PRESENCE UNSPECIFIED: ICD-10-CM

## 2018-03-03 DIAGNOSIS — M54.5 CHRONIC BILATERAL LOW BACK PAIN, WITH SCIATICA PRESENCE UNSPECIFIED: ICD-10-CM

## 2018-03-03 PROCEDURE — 72148 MRI LUMBAR SPINE W/O DYE: CPT | Mod: TC,PO

## 2018-03-03 PROCEDURE — 72114 X-RAY EXAM L-S SPINE BENDING: CPT | Mod: TC,FY,PO

## 2018-03-03 PROCEDURE — 72114 X-RAY EXAM L-S SPINE BENDING: CPT | Mod: 26,,, | Performed by: RADIOLOGY

## 2018-03-03 PROCEDURE — 72148 MRI LUMBAR SPINE W/O DYE: CPT | Mod: 26,,, | Performed by: RADIOLOGY

## 2018-03-05 ENCOUNTER — OFFICE VISIT (OUTPATIENT)
Dept: NEUROSURGERY | Facility: CLINIC | Age: 58
End: 2018-03-05
Payer: MEDICARE

## 2018-03-05 VITALS
HEART RATE: 74 BPM | BODY MASS INDEX: 29.7 KG/M2 | HEIGHT: 68 IN | WEIGHT: 196 LBS | SYSTOLIC BLOOD PRESSURE: 119 MMHG | DIASTOLIC BLOOD PRESSURE: 77 MMHG

## 2018-03-05 DIAGNOSIS — M54.50 CHRONIC MIDLINE LOW BACK PAIN WITHOUT SCIATICA: ICD-10-CM

## 2018-03-05 DIAGNOSIS — M54.2 CERVICALGIA: ICD-10-CM

## 2018-03-05 DIAGNOSIS — G89.29 CHRONIC MIDLINE LOW BACK PAIN WITHOUT SCIATICA: ICD-10-CM

## 2018-03-05 DIAGNOSIS — Z72.0 TOBACCO USE: Primary | ICD-10-CM

## 2018-03-05 PROCEDURE — 3074F SYST BP LT 130 MM HG: CPT | Mod: S$GLB,,, | Performed by: NEUROLOGICAL SURGERY

## 2018-03-05 PROCEDURE — 99214 OFFICE O/P EST MOD 30 MIN: CPT | Mod: S$GLB,,, | Performed by: NEUROLOGICAL SURGERY

## 2018-03-05 PROCEDURE — 99999 PR PBB SHADOW E&M-EST. PATIENT-LVL III: CPT | Mod: PBBFAC,,, | Performed by: NEUROLOGICAL SURGERY

## 2018-03-05 PROCEDURE — 3078F DIAST BP <80 MM HG: CPT | Mod: S$GLB,,, | Performed by: NEUROLOGICAL SURGERY

## 2018-03-05 NOTE — PROGRESS NOTES
Neurosurgery History and Physical    Patient ID: Adam Persaud is a 57 y.o. male.    Chief Complaint   Patient presents with    Lumbar Spine Pain (L-Spine)     LBP follow up with MRI and XR. Seen by Karen Carr. Denies changes in symptoms since previous visit.            Review of Systems   Constitutional: Negative.    HENT: Negative.    Eyes: Negative.    Respiratory: Negative.    Cardiovascular: Negative.    Gastrointestinal: Negative.    Endocrine: Negative.    Genitourinary: Negative.    Musculoskeletal: Positive for arthralgias, back pain and neck stiffness.   Skin: Negative.    Allergic/Immunologic: Negative.    Neurological: Positive for numbness. Negative for weakness.   Hematological: Negative.    Psychiatric/Behavioral: Negative.        Past Medical History:   Diagnosis Date    Gout     Hyperlipidemia     Hypertension      Social History     Social History    Marital status:      Spouse name: N/A    Number of children: N/A    Years of education: N/A     Occupational History    Not on file.     Social History Main Topics    Smoking status: Current Every Day Smoker     Packs/day: 2.00     Years: 30.00     Types: Cigarettes    Smokeless tobacco: Not on file    Alcohol use Yes      Comment: social    Drug use: No    Sexual activity: Not on file     Other Topics Concern    Not on file     Social History Narrative    No narrative on file     No family history on file.  Review of patient's allergies indicates:  No Known Allergies    Current Outpatient Prescriptions:     allopurinol (ZYLOPRIM) 300 MG tablet, Take 300 mg by mouth once daily., Disp: , Rfl:     aspirin 325 MG tablet, Take 325 mg by mouth once daily., Disp: , Rfl:     clopidogrel (PLAVIX) 75 mg tablet, Take 75 mg by mouth once daily., Disp: , Rfl:     doxepin (SINEQUAN) 10 MG capsule, Take 10 mg by mouth nightly as needed (as needed for pain)., Disp: , Rfl:     gabapentin (NEURONTIN) 300 MG capsule, Take two capsules  "(600mg) in the morning and three capsules (900 mg) at night., Disp: 150 capsule, Rfl: 2    hydrocodone-acetaminophen 10-325mg (NORCO)  mg Tab, Take 1 tablet by mouth 3 (three) times daily as needed., Disp: 90 tablet, Rfl: 0    indomethacin (INDOCIN) 50 MG capsule, Take 50 mg by mouth 3 (three) times daily., Disp: , Rfl:     lidocaine (LIDODERM) 5 %, Place 1 patch onto the skin once daily., Disp: 10 patch, Rfl: 0    metoprolol succinate (TOPROL-XL) 50 MG 24 hr tablet, Take 50 mg by mouth once daily., Disp: , Rfl:     naloxegol (MOVANTIK) tablet, Take 25 mg by mouth once daily., Disp: , Rfl:     nitroGLYCERIN (NITROSTAT) 0.4 MG SL tablet, Place 0.4 mg under the tongue every 5 (five) minutes as needed for Chest pain., Disp: , Rfl:     pravastatin (PRAVACHOL) 40 MG tablet, Take 40 mg by mouth once daily., Disp: , Rfl:     losartan (COZAAR) 100 MG tablet, Take 1 tablet (100 mg total) by mouth once daily., Disp: 30 tablet, Rfl: 0    Current Facility-Administered Medications:     lidocaine (PF) 10 mg/ml (1%) injection 10 mg, 1 mL, Intradermal, Once, Bucky Paz MD  Blood pressure 119/77, pulse 74, height 5' 8" (1.727 m), weight 88.9 kg (195 lb 15.8 oz).      Neurologic Exam     Mental Status   Oriented to person, place, and time.   Attention: normal. Concentration: normal.   Speech: speech is normal   Level of consciousness: alert  Knowledge: good.     Cranial Nerves     CN II   Visual acuity: normal    CN III, IV, VI   Pupils are equal, round, and reactive to light.  Extraocular motions are normal.     CN V   Facial sensation intact.     CN VII   Facial expression full, symmetric.     CN VIII   Hearing: intact    CN IX, X   Palate: symmetric    CN XI   CN XI normal.     CN XII   CN XII normal.     Motor Exam   Muscle bulk: normal  Overall muscle tone: normal  Right arm pronator drift: absent  Left arm pronator drift: absent    Strength   Right deltoid: 5/5  Left deltoid: 5/5  Right biceps: 5/5  Left " biceps: 5/5  Right triceps: 5/5  Left triceps: 5/5  Right wrist flexion: 5/5  Left wrist flexion: 5/5  Right wrist extension: 5/5  Left wrist extension: 5/5  Right interossei: 5/5  Left interossei: 5/5  Right iliopsoas: 5/5  Left iliopsoas: 5/5  Right quadriceps: 5/5  Left quadriceps: 5/5  Right hamstrin/5  Left hamstrin/5  Right anterior tibial: 5/5  Left anterior tibial: 5/5  Right posterior tibial: 5/5  Left posterior tibial: 5/5  Right peroneal: 5/5  Left peroneal: 5/5  Right gastroc: 5/5  Left gastroc: 5/5    Sensory Exam   Right arm light touch: normal  Right leg light touch: normal  Left leg light touch: normal  Sensory deficit distribution on left: non-dermatomal distribution    Gait, Coordination, and Reflexes     Gait  Gait: normal    Coordination   Romberg: negative  Finger to nose coordination: normal    Tremor   Resting tremor: absent    Reflexes   Right brachioradialis: 1+  Left brachioradialis: 0  Right biceps: 1+  Left biceps: 1+  Right triceps: 1+  Left triceps: 1+  Right patellar: 2+  Left patellar: 2+  Right achilles: 1+  Left achilles: 1+  Right plantar: normal  Left plantar: normal  Right Aviles: absent  Left Aviles: absent  Right ankle clonus: absent  Left ankle clonus: absent      Physical Exam   Constitutional: He is oriented to person, place, and time. He appears well-developed and well-nourished.   HENT:   Head: Normocephalic and atraumatic.   Eyes: EOM are normal. Pupils are equal, round, and reactive to light.   Neck: Normal range of motion. Neck supple.   Cardiovascular: Normal rate and regular rhythm.    Pulmonary/Chest: Effort normal.   Abdominal: Soft.   Musculoskeletal: Normal range of motion.   Neurological: He is alert and oriented to person, place, and time. He has a normal Finger-Nose-Finger Test and a normal Romberg Test. Gait normal.   Reflex Scores:       Tricep reflexes are 1+ on the right side and 1+ on the left side.       Bicep reflexes are 1+ on the right side  "and 1+ on the left side.       Brachioradialis reflexes are 1+ on the right side and 0 on the left side.       Patellar reflexes are 2+ on the right side and 2+ on the left side.       Achilles reflexes are 1+ on the right side and 1+ on the left side.  Skin: Skin is warm and dry.   Psychiatric: He has a normal mood and affect. His speech is normal and behavior is normal. Judgment and thought content normal.   Nursing note and vitals reviewed.      Vital Signs  Pulse: 74  BP: 119/77  Pain Score:   5  Pain Loc: Back  Height and Weight  Height: 5' 8" (172.7 cm)  Weight: 88.9 kg (195 lb 15.8 oz)  BSA (Calculated - sq m): 2.07 sq meters  BMI (Calculated): 29.9  Weight in (lb) to have BMI = 25: 164.1]    Provider dictation:  I reviewed the imaging. He has chronic degenerative disc disease at L4-L5 and L5-S1 There are central disc bulges at these levels but no severe stenosis. In his cervical spine, he has spondylotic foraminal stenosis worse on the right at C5-C6 and C6-C7 bilaterally. There is no soinal cord compression. His pain is primarily in the axial lower lumbar spine with some radiation to his left hip b ut no lower. He has no numbness or weakness in his lower extremities. He has had some neck pain and shoulder pain on and off for years also but his is currently improved. Most of the radiation from his neck pain was on the left side to his shoulder. 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 Lately, he has been experiencing some right shoulder pain. He has no upper extremity weakness.     In summary, he has no symptoms of lumbar radiculopathy and his primary source of pain is his axial low back pain. His cervical spine issues are currently not severe and while he has some significant foraminal stenosis worse on the right and stable compared to a 2015 MRI, his symptoms of alternating but mostly " left shoulder pain are not too strongly suggestive of active radiculopathy. If his neck and shoulder pain flares up again, I recommend cervical YARITZA.     He is a current heavy smoker and has not made any attempts to quit. I have explained to him that he would not be a surgical candidate for fusion until he quit smoking. Furthermore, I have counseled him that quitting smoking will most likely result in improvement of his low back pain. Lastly, I have explained that he had not exhausted non-surgical measures for his axial low back pain. I recommended he consider medial branch blocks/RFA and possibly left SI joint injections. Lastly, if this fails, he would be a candidate for a spinal cord stimulator trial. F/U with Dr Paz.    Visit Diagnosis:  Tobacco use    Chronic midline low back pain without sciatica    Cervicalgia

## 2018-03-21 ENCOUNTER — OFFICE VISIT (OUTPATIENT)
Dept: PAIN MEDICINE | Facility: CLINIC | Age: 58
End: 2018-03-21
Payer: MEDICARE

## 2018-03-21 VITALS
TEMPERATURE: 98 F | HEART RATE: 66 BPM | BODY MASS INDEX: 31.06 KG/M2 | OXYGEN SATURATION: 99 % | WEIGHT: 204.25 LBS | DIASTOLIC BLOOD PRESSURE: 78 MMHG | RESPIRATION RATE: 20 BRPM | SYSTOLIC BLOOD PRESSURE: 135 MMHG

## 2018-03-21 DIAGNOSIS — M50.30 DDD (DEGENERATIVE DISC DISEASE), CERVICAL: ICD-10-CM

## 2018-03-21 DIAGNOSIS — R26.81 GAIT INSTABILITY: ICD-10-CM

## 2018-03-21 DIAGNOSIS — M47.816 LUMBAR SPONDYLOSIS: Primary | ICD-10-CM

## 2018-03-21 DIAGNOSIS — M47.812 SPONDYLOSIS OF CERVICAL REGION WITHOUT MYELOPATHY OR RADICULOPATHY: ICD-10-CM

## 2018-03-21 DIAGNOSIS — M51.36 DDD (DEGENERATIVE DISC DISEASE), LUMBAR: ICD-10-CM

## 2018-03-21 PROCEDURE — 99214 OFFICE O/P EST MOD 30 MIN: CPT | Mod: S$GLB,,, | Performed by: PHYSICIAN ASSISTANT

## 2018-03-21 PROCEDURE — 3075F SYST BP GE 130 - 139MM HG: CPT | Mod: CPTII,S$GLB,, | Performed by: PHYSICIAN ASSISTANT

## 2018-03-21 PROCEDURE — 3078F DIAST BP <80 MM HG: CPT | Mod: CPTII,S$GLB,, | Performed by: PHYSICIAN ASSISTANT

## 2018-03-21 PROCEDURE — 99999 PR PBB SHADOW E&M-EST. PATIENT-LVL IV: CPT | Mod: PBBFAC,,, | Performed by: PHYSICIAN ASSISTANT

## 2018-03-21 RX ORDER — SODIUM CHLORIDE, SODIUM LACTATE, POTASSIUM CHLORIDE, CALCIUM CHLORIDE 600; 310; 30; 20 MG/100ML; MG/100ML; MG/100ML; MG/100ML
INJECTION, SOLUTION INTRAVENOUS CONTINUOUS
Status: CANCELLED | OUTPATIENT
Start: 2018-03-29

## 2018-03-21 NOTE — PROGRESS NOTES
This note was completed with dictation software and grammatical errors may exist.    CC:  neck pain, back pain    HPI: The patient is a 57-year-old man with hypertension, gout, chronic tobacco use who presents in self-referral for neck pain radiating into the left arm and bilateral low back pain.  He returns in follow-up today with neck pain and back pain.  Since his last visit, he had a visit with neurosurgery and an operation was not recommended.  Medial branch blocks were suggested for the lumbar spine and a possible spinal cord stimulator if unsuccessful.  He continues to have bilateral low back pain.  This is much worse with standing and walking, improved with sitting and pain medication.  He also complains of right greater than left neck pain radiating into the right shoulder and into the scapular regions bilaterally.  This is worse with using his arm and at night.  He admits again to overtaking his medication and it sounds like he is out 4-5 days early.  He reports intermittent numbness in his right arm.  He denies weakness, bladder or bowel incontinence.  He was put on Wellbutrin so that he can attempt smoking cessation.     Pain intervention history: He has done physical therapy several times without relief.  The patient had previously been seen by Dr. Alfonso Luciano and had received hydrocodone on a regular basis.  However, when the patient lost his insurance he ended up seeing 2 separate physicians who've charge cash only and was receiving 30 mg oxycodone and Opana in various doses.  Unclear why he is no longer seeing them but financial reasons may be a concern.  Patient states that he rarely leaves the house now.    ROS: He reports headaches, runny nose, appetite change, joint stiffness, back pain, difficulty sleeping, anxiety, depression and loss of balance.  Balance of review of systems is negative.    Past Medical History:   Diagnosis Date    Gout     Hyperlipidemia     Hypertension        Past  Surgical History:   Procedure Laterality Date    SHOULDER ARTHROSCOPY Right        Social History     Social History    Marital status:      Spouse name: N/A    Number of children: N/A    Years of education: N/A     Social History Main Topics    Smoking status: Current Every Day Smoker     Packs/day: 2.00     Years: 30.00     Types: Cigarettes    Smokeless tobacco: None    Alcohol use Yes      Comment: social    Drug use: No    Sexual activity: Not Asked     Other Topics Concern    None     Social History Narrative    None         Medications/Allergies: See med card    Vitals:    03/21/18 0803   BP: 135/78   Pulse: 66   Resp: 20   Temp: 97.6 °F (36.4 °C)   TempSrc: Oral   SpO2: 99%   Weight: 92.7 kg (204 lb 4.1 oz)   PainSc:   6   PainLoc: Back         Physical exam:  Gen: A and O x3, pleasant, well-groomed, stale tobacco odor  Skin: No rashes or obvious lesions  HEENT: PERRLA, no obvious deformities on ears or in canals.Trachea midline.  CVS: Regular rate and rhythm, normal palpable pulses.  Resp: Clear to auscultation bilaterally, no wheezes or rales.  Abdomen: Soft, NT/ND.  Musculoskeletal: Slow cautious gait, using a walker.    Neuro:  Upper extremities: 5/5 strength bilaterally   Lower extremities: 5/5 strength bilaterally  Reflexes: Brachioradialis 1+, Bicep 2+, Tricep 2+. Patellar 0+, Achilles 0+ bilaterally.  Sensory: Intact and symmetrical to light touch and pinprick in C2-T1 dermatomes bilaterally, except for left hand hypoesthesia. Intact and symmetrical to light touch and pinprick in L2-S1 dermatomes bilaterally.    Cervical Spine:  Cervical spine: Range of motion is mildly reduced with flexion with increased pain, moderately reduced with extension and lateral rotation to either side causing increased pain, worse on the right.  Spurling's maneuver causes neck pain to either side.  Myofascial exam: No Tenderness to palpation across cervical paraspinous region bilaterally.    Lumbar  spine:  Lumbar spine: Range of motion is moderately reduced with flexion with increased pain, severely reduced with extension with increased pain bilateral lumbar region.  Zion's test causes no increased pain on either side.    Supine straight leg raise is negative bilaterally.    Internal and external rotation of the hip causes no increased pain on either side.  Myofascial exam: No tenderness to palpation across lumbar paraspinous muscles.      Imagin17 CT Cspine  There multilevel cervical disc bulges and disc herniations.  No significant includes a calcified disc herniation at C5-C6 to the right of midline with severe right C5-C6 foraminal stenosis.  Loss of cervical disc space height at all levels throughout the cervical spine with marginal osteophytes present.  No acute displaced fracture or dislocation noted.  Hypertrophic facet arthrosis at all levels throughout the cervical spine.  A review of the paraspinous soft tissues otherwise demonstrates nothing usual.    MRI report 17: [facility].  At C2/3 there is central disc herniation posteriorly extending 2.8 mm posterior to the margin of the vertebral column.  There is no neural foraminal stenosis.  At C3/4 there is an annular bulge and the AP diameter of the canal is 7.4 mm.  There is bilateral uncinate spurring with moderate narrowing of the neural foramen bilaterally.  At C3/4 there is diffuse disc herniation and protrusion with AP diameter of the canal 7.7 mm.  There is bilateral uncinate spurring with moderate foraminal narrowing bilaterally.  At C5/6 there is a right sided disc herniation resulting in moderate effacement of the right anterolateral aspect of the thecal sac.  The dimension of the canal is 9.4 mm.  There is bilateral uncinate spurring with severe narrowing of the neural foramen on the right and moderate to severe narrowing of the neural foramen on the left.  At C6/7 there is diffuse disc herniation and protrusion  with AP dimension of the central spinal canal 9.4 mm.  There is bilateral uncinate spurring with moderate to severe narrowing of the neural foramen bilaterally.  At C7/T1 there is no focal disc herniation or protrusion.  There is no central spinal canal nor neural foraminal stenosis.    MRI lumbar spine report 3/31/17 [facility] MRI: L1/2 through L3/4 is fairly normal.  At L4/5 demonstrates moderate to severe disc space narrowing, moderate disc bulging, mild foraminal narrowing bilaterally left greater than right with extra foraminal nerve root contact on the left.  At L5/S1 there is moderate severe disc space narrowing, moderate disc bulge, superimposed desiccated chronic appearing central disc protrusion, mild bilateral recess narrowing and contact of the descending S1 nerve roots bilaterally but without visible nerve root impingement extra foraminal nerve root contact bilaterally left greater than right.    Bilateral SI joint MRI 3/31/17: Normal exam.    Assessment:  The patient is a 57-year-old man with hypertension, gout, chronic tobacco use who presents in self-referral for neck pain radiating into the left arm and bilateral low back pain.     1. Lumbar spondylosis     2. DDD (degenerative disc disease), lumbar     3. Spondylosis of cervical region without myelopathy or radiculopathy     4. DDD (degenerative disc disease), cervical     5. Gait instability         Plan:  1.  The patient is currently scheduled for an L5/S1 interlaminar YARITZA but we will change this to diagnostic bilateral L3, 4 and 5 medial branch blocks.  If successful, we will proceed with another set of medial branch blocks before scheduling radiofrequency ablation.  If he does not have relief, we will consider spinal cord stimulation.  2.  The patient again admitted to running out of his pain medication early.  I had explained to him during last visit that if this happens again, we will no longer provide controlled substances.  This has been  discussed with Dr. Paz and we are happy to help the patient with procedures but we will no longer provide controlled substances to him.  3.  For his neck pain, we can consider right C3, 4, 5 and 6 medial branch blocks in the future.  4.  Follow-up in 4 weeks post procedure sooner as needed.      Greater than 50% of this 25 minute visit was spent on counseling the patient.

## 2018-03-28 DIAGNOSIS — M51.36 DDD (DEGENERATIVE DISC DISEASE), LUMBAR: Primary | ICD-10-CM

## 2018-03-28 RX ORDER — BUPROPION HYDROCHLORIDE 150 MG/1
150 TABLET ORAL DAILY
Status: ON HOLD | COMMUNITY
End: 2018-03-29

## 2018-03-29 ENCOUNTER — HOSPITAL ENCOUNTER (OUTPATIENT)
Dept: RADIOLOGY | Facility: HOSPITAL | Age: 58
Discharge: HOME | End: 2018-03-29
Attending: ANESTHESIOLOGY | Admitting: ANESTHESIOLOGY
Payer: MEDICARE

## 2018-03-29 ENCOUNTER — TELEPHONE (OUTPATIENT)
Dept: PAIN MEDICINE | Facility: CLINIC | Age: 58
End: 2018-03-29

## 2018-03-29 ENCOUNTER — SURGERY (OUTPATIENT)
Age: 58
End: 2018-03-29

## 2018-03-29 ENCOUNTER — HOSPITAL ENCOUNTER (OUTPATIENT)
Facility: HOSPITAL | Age: 58
Discharge: HOME | End: 2018-03-29
Attending: ANESTHESIOLOGY | Admitting: ANESTHESIOLOGY
Payer: MEDICARE

## 2018-03-29 VITALS
TEMPERATURE: 98 F | WEIGHT: 186 LBS | DIASTOLIC BLOOD PRESSURE: 95 MMHG | SYSTOLIC BLOOD PRESSURE: 140 MMHG | OXYGEN SATURATION: 98 % | BODY MASS INDEX: 28.19 KG/M2 | HEIGHT: 68 IN | HEART RATE: 68 BPM | RESPIRATION RATE: 16 BRPM

## 2018-03-29 DIAGNOSIS — M51.36 DDD (DEGENERATIVE DISC DISEASE), LUMBAR: ICD-10-CM

## 2018-03-29 DIAGNOSIS — M47.816 LUMBAR SPONDYLOSIS: Primary | ICD-10-CM

## 2018-03-29 PROCEDURE — 64494 INJ PARAVERT F JNT L/S 2 LEV: CPT | Mod: 50,PO | Performed by: ANESTHESIOLOGY

## 2018-03-29 PROCEDURE — 25000003 PHARM REV CODE 250: Mod: PO | Performed by: ANESTHESIOLOGY

## 2018-03-29 PROCEDURE — 25500020 PHARM REV CODE 255: Mod: PO | Performed by: ANESTHESIOLOGY

## 2018-03-29 PROCEDURE — 64494 INJ PARAVERT F JNT L/S 2 LEV: CPT | Mod: 50,,, | Performed by: ANESTHESIOLOGY

## 2018-03-29 PROCEDURE — 63600175 PHARM REV CODE 636 W HCPCS: Mod: PO | Performed by: ANESTHESIOLOGY

## 2018-03-29 PROCEDURE — 76000 FLUOROSCOPY <1 HR PHYS/QHP: CPT | Mod: TC,PO

## 2018-03-29 PROCEDURE — 64493 INJ PARAVERT F JNT L/S 1 LEV: CPT | Mod: 50,PO | Performed by: ANESTHESIOLOGY

## 2018-03-29 PROCEDURE — 64495 INJ PARAVERT F JNT L/S 3 LEV: CPT | Mod: 50,PO | Performed by: ANESTHESIOLOGY

## 2018-03-29 PROCEDURE — 64493 INJ PARAVERT F JNT L/S 1 LEV: CPT | Mod: 50,,, | Performed by: ANESTHESIOLOGY

## 2018-03-29 PROCEDURE — 99152 MOD SED SAME PHYS/QHP 5/>YRS: CPT | Mod: ,,, | Performed by: ANESTHESIOLOGY

## 2018-03-29 RX ORDER — BUPROPION HYDROCHLORIDE 150 MG/1
150 TABLET ORAL DAILY
Status: ON HOLD | COMMUNITY
End: 2018-05-22

## 2018-03-29 RX ORDER — BUPIVACAINE HYDROCHLORIDE 2.5 MG/ML
INJECTION, SOLUTION EPIDURAL; INFILTRATION; INTRACAUDAL
Status: DISCONTINUED | OUTPATIENT
Start: 2018-03-29 | End: 2018-03-29 | Stop reason: HOSPADM

## 2018-03-29 RX ORDER — LIDOCAINE HYDROCHLORIDE 10 MG/ML
INJECTION, SOLUTION EPIDURAL; INFILTRATION; INTRACAUDAL; PERINEURAL
Status: DISCONTINUED | OUTPATIENT
Start: 2018-03-29 | End: 2018-03-29 | Stop reason: HOSPADM

## 2018-03-29 RX ORDER — MIDAZOLAM HYDROCHLORIDE 2 MG/2ML
INJECTION, SOLUTION INTRAMUSCULAR; INTRAVENOUS
Status: DISCONTINUED | OUTPATIENT
Start: 2018-03-29 | End: 2018-03-29 | Stop reason: HOSPADM

## 2018-03-29 RX ORDER — SODIUM CHLORIDE, SODIUM LACTATE, POTASSIUM CHLORIDE, CALCIUM CHLORIDE 600; 310; 30; 20 MG/100ML; MG/100ML; MG/100ML; MG/100ML
INJECTION, SOLUTION INTRAVENOUS CONTINUOUS
Status: DISCONTINUED | OUTPATIENT
Start: 2018-03-29 | End: 2018-03-29 | Stop reason: HOSPADM

## 2018-03-29 RX ADMIN — MIDAZOLAM HYDROCHLORIDE 2 MG: 1 INJECTION, SOLUTION INTRAMUSCULAR; INTRAVENOUS at 04:03

## 2018-03-29 RX ADMIN — BUPIVACAINE HYDROCHLORIDE 6 ML: 2.5 INJECTION, SOLUTION EPIDURAL; INFILTRATION; INTRACAUDAL; PERINEURAL at 04:03

## 2018-03-29 RX ADMIN — LIDOCAINE HYDROCHLORIDE 6 ML: 10 INJECTION, SOLUTION EPIDURAL; INFILTRATION; INTRACAUDAL; PERINEURAL at 04:03

## 2018-03-29 RX ADMIN — IOHEXOL 3 ML: 300 INJECTION, SOLUTION INTRAVENOUS at 04:03

## 2018-03-29 NOTE — H&P (VIEW-ONLY)
This note was completed with dictation software and grammatical errors may exist.    CC:  neck pain, back pain    HPI: The patient is a 57-year-old man with hypertension, gout, chronic tobacco use who presents in self-referral for neck pain radiating into the left arm and bilateral low back pain.  He returns in follow-up today with neck pain and back pain.  Since his last visit, he had a visit with neurosurgery and an operation was not recommended.  Medial branch blocks were suggested for the lumbar spine and a possible spinal cord stimulator if unsuccessful.  He continues to have bilateral low back pain.  This is much worse with standing and walking, improved with sitting and pain medication.  He also complains of right greater than left neck pain radiating into the right shoulder and into the scapular regions bilaterally.  This is worse with using his arm and at night.  He admits again to overtaking his medication and it sounds like he is out 4-5 days early.  He reports intermittent numbness in his right arm.  He denies weakness, bladder or bowel incontinence.  He was put on Wellbutrin so that he can attempt smoking cessation.     Pain intervention history: He has done physical therapy several times without relief.  The patient had previously been seen by Dr. Alfonso Luciano and had received hydrocodone on a regular basis.  However, when the patient lost his insurance he ended up seeing 2 separate physicians who've charge cash only and was receiving 30 mg oxycodone and Opana in various doses.  Unclear why he is no longer seeing them but financial reasons may be a concern.  Patient states that he rarely leaves the house now.    ROS: He reports headaches, runny nose, appetite change, joint stiffness, back pain, difficulty sleeping, anxiety, depression and loss of balance.  Balance of review of systems is negative.    Past Medical History:   Diagnosis Date    Gout     Hyperlipidemia     Hypertension        Past  Surgical History:   Procedure Laterality Date    SHOULDER ARTHROSCOPY Right        Social History     Social History    Marital status:      Spouse name: N/A    Number of children: N/A    Years of education: N/A     Social History Main Topics    Smoking status: Current Every Day Smoker     Packs/day: 2.00     Years: 30.00     Types: Cigarettes    Smokeless tobacco: None    Alcohol use Yes      Comment: social    Drug use: No    Sexual activity: Not Asked     Other Topics Concern    None     Social History Narrative    None         Medications/Allergies: See med card    Vitals:    03/21/18 0803   BP: 135/78   Pulse: 66   Resp: 20   Temp: 97.6 °F (36.4 °C)   TempSrc: Oral   SpO2: 99%   Weight: 92.7 kg (204 lb 4.1 oz)   PainSc:   6   PainLoc: Back         Physical exam:  Gen: A and O x3, pleasant, well-groomed, stale tobacco odor  Skin: No rashes or obvious lesions  HEENT: PERRLA, no obvious deformities on ears or in canals.Trachea midline.  CVS: Regular rate and rhythm, normal palpable pulses.  Resp: Clear to auscultation bilaterally, no wheezes or rales.  Abdomen: Soft, NT/ND.  Musculoskeletal: Slow cautious gait, using a walker.    Neuro:  Upper extremities: 5/5 strength bilaterally   Lower extremities: 5/5 strength bilaterally  Reflexes: Brachioradialis 1+, Bicep 2+, Tricep 2+. Patellar 0+, Achilles 0+ bilaterally.  Sensory: Intact and symmetrical to light touch and pinprick in C2-T1 dermatomes bilaterally, except for left hand hypoesthesia. Intact and symmetrical to light touch and pinprick in L2-S1 dermatomes bilaterally.    Cervical Spine:  Cervical spine: Range of motion is mildly reduced with flexion with increased pain, moderately reduced with extension and lateral rotation to either side causing increased pain, worse on the right.  Spurling's maneuver causes neck pain to either side.  Myofascial exam: No Tenderness to palpation across cervical paraspinous region bilaterally.    Lumbar  spine:  Lumbar spine: Range of motion is moderately reduced with flexion with increased pain, severely reduced with extension with increased pain bilateral lumbar region.  Zion's test causes no increased pain on either side.    Supine straight leg raise is negative bilaterally.    Internal and external rotation of the hip causes no increased pain on either side.  Myofascial exam: No tenderness to palpation across lumbar paraspinous muscles.      Imagin17 CT Cspine  There multilevel cervical disc bulges and disc herniations.  No significant includes a calcified disc herniation at C5-C6 to the right of midline with severe right C5-C6 foraminal stenosis.  Loss of cervical disc space height at all levels throughout the cervical spine with marginal osteophytes present.  No acute displaced fracture or dislocation noted.  Hypertrophic facet arthrosis at all levels throughout the cervical spine.  A review of the paraspinous soft tissues otherwise demonstrates nothing usual.    MRI report 17: [facility]  At C2/3 there is central disc herniation posteriorly extending 2.8 mm posterior to the margin of the vertebral column.  There is no neural foraminal stenosis.  At C3/4 there is an annular bulge and the AP diameter of the canal is 7.4 mm.  There is bilateral uncinate spurring with moderate narrowing of the neural foramen bilaterally.  At C3/4 there is diffuse disc herniation and protrusion with AP diameter of the canal 7.7 mm.  There is bilateral uncinate spurring with moderate foraminal narrowing bilaterally.  At C5/6 there is a right sided disc herniation resulting in moderate effacement of the right anterolateral aspect of the thecal sac.  The dimension of the canal is 9.4 mm.  There is bilateral uncinate spurring with severe narrowing of the neural foramen on the right and moderate to severe narrowing of the neural foramen on the left.  At C6/7 there is diffuse disc herniation and protrusion  with AP dimension of the central spinal canal 9.4 mm.  There is bilateral uncinate spurring with moderate to severe narrowing of the neural foramen bilaterally.  At C7/T1 there is no focal disc herniation or protrusion.  There is no central spinal canal nor neural foraminal stenosis.    MRI lumbar spine report 3/31/17 [facility]: L1/2 through L3/4 is fairly normal.  At L4/5 demonstrates moderate to severe disc space narrowing, moderate disc bulging, mild foraminal narrowing bilaterally left greater than right with extra foraminal nerve root contact on the left.  At L5/S1 there is moderate severe disc space narrowing, moderate disc bulge, superimposed desiccated chronic appearing central disc protrusion, mild bilateral recess narrowing and contact of the descending S1 nerve roots bilaterally but without visible nerve root impingement extra foraminal nerve root contact bilaterally left greater than right.    Bilateral SI joint MRI 3/31/17: Normal exam.    Assessment:  The patient is a 57-year-old man with hypertension, gout, chronic tobacco use who presents in self-referral for neck pain radiating into the left arm and bilateral low back pain.     1. Lumbar spondylosis     2. DDD (degenerative disc disease), lumbar     3. Spondylosis of cervical region without myelopathy or radiculopathy     4. DDD (degenerative disc disease), cervical     5. Gait instability         Plan:  1.  The patient is currently scheduled for an L5/S1 interlaminar YARITZA but we will change this to diagnostic bilateral L3, 4 and 5 medial branch blocks.  If successful, we will proceed with another set of medial branch blocks before scheduling radiofrequency ablation.  If he does not have relief, we will consider spinal cord stimulation.  2.  The patient again admitted to running out of his pain medication early.  I had explained to him during last visit that if this happens again, we will no longer provide controlled substances.  This has been  discussed with Dr. Paz and we are happy to help the patient with procedures but we will no longer provide controlled substances to him.  3.  For his neck pain, we can consider right C3, 4, 5 and 6 medial branch blocks in the future.  4.  Follow-up in 4 weeks post procedure sooner as needed.      Greater than 50% of this 25 minute visit was spent on counseling the patient.

## 2018-03-29 NOTE — DISCHARGE SUMMARY
[facility]  Discharge Note  Short Stay    Admit Date: 3/29/2018    Discharge Date: 3/29/2018    Attending Physician: Bucky Paz MD     Discharge Provider: Bucyk Paz    Diagnoses:  Active Hospital Problems    Diagnosis  POA    *Lumbar spondylosis [M47.816]  Yes      Resolved Hospital Problems    Diagnosis Date Resolved POA   No resolved problems to display.       Discharged Condition: good    Final Diagnoses: Lumbar spondylosis [M47.816]    Disposition: Home or Self Care    Hospital Course: no complications, uneventful    Outcome of Hospitalization, Treatment, Procedure, or Surgery:  Patient was admitted for outpatient procedure. The patient underwent procedure without complications and are discharged home    Follow up/Patient Instructions:  Follow up as scheduled/Patient has received instructions and follow up date    Medications:  Continue previous medications      Discharge Procedure Orders  Call MD for:  temperature >100.4     Call MD for:  severe uncontrolled pain     Call MD for:  redness, tenderness, or signs of infection (pain, swelling, redness, odor or green/yellow discharge around incision site)     Call MD for:  severe persistent headache     No dressing needed           Discharge Procedure Orders (must include Diet, Follow-up, Activity):    Discharge Procedure Orders (must include Diet, Follow-up, Activity)  Call MD for:  temperature >100.4     Call MD for:  severe uncontrolled pain     Call MD for:  redness, tenderness, or signs of infection (pain, swelling, redness, odor or green/yellow discharge around incision site)     Call MD for:  severe persistent headache     No dressing needed

## 2018-03-29 NOTE — TELEPHONE ENCOUNTER
----- Message from Madan Almaarz sent at 3/29/2018  8:14 AM CDT -----  Contact: Adam Fagan to make sure he is approved. He was told by [employer] this morning that he is approved. Please call him to advise whether he should come or not. [phone] (home)   Thanks!

## 2018-03-29 NOTE — DISCHARGE INSTRUCTIONS
Recovery After Procedural Sedation (Adult)  You have been given medicine by vein to make you sleep during your surgery. This may have included both a pain medicine and sleeping medicine. Most of the effects have worn off. But you may still have some drowsiness for the next 6 to 8 hours.  Home care  Follow these guidelines when you get home:  · For the next 8 hours, you should be watched by a responsible adult. This person should make sure your condition is not getting worse.  · Don't drink any alcohol for the next 24 hours.  · Don't drive, operate dangerous machinery, or make important business or personal decisions during the next 24 hours.  Note: Your healthcare provider may tell you not to take any medicine by mouth for pain or sleep in the next 4 hours. These medicines may react with the medicines you were given in the hospital. This could cause a much stronger response than usual.  Follow-up care  Follow up with your healthcare provider if you are not alert and back to your usual level of activity within 12 hours.  When to seek medical advice  Call your healthcare provider right away if any of these occur:  · Drowsiness gets worse  · Weakness or dizziness gets worse  · Repeated vomiting  · You can't be awakened   Date Last Reviewed: 10/18/2016  © 5002-6661 [employer]. [address] [place] [address], [place] [address]. All rights reserved. This information is not intended as a substitute for professional medical care. Always follow your healthcare professional's instructions.      Home care instructions  Apply ice pack to the injection site for 20 minutes periods for the first 24 hrs for soreness/discomfort at injection site DO NOT USE HEAT FOR 24 HOURS  Keep site clean and dry for 24 hours, remove bandaid when desired  Do not drive until tomorrow  Take care when walking after a lumbar injection  Resume regular activities tonight  Pain office will call on Monday   Resume home medication as prescribed  today  Resume Aspirin, Plavix, or Coumadin the day after the procedure unless otherwise instructed.    SEE IMMEDIATE MEDICAL HELP FOR:  Severe increase in your usual pain or appearance of new pain  Prolonged or increasing weakness or numbness in the legs or arms  Drainage, redness, active bleeding, or increased swelling at the injection site  Temperature over 100.0 degrees F.  Headache that increases when your head is upright and decreases when you lie flat    CALL 911 OR GO DIRECTLY TO EMERGENCY DEPARTMENT FOR:  Shortness of breath, chest pain, or problems breathing

## 2018-03-29 NOTE — OP NOTE
PROCEDURE DATE: 3/29/2018    PROCEDURE:  Bilateral L3,4,5 medial branch nerve block on the bilateral-side    DIAGNOSIS:  Lumbar spondylosis    Post Op diagnosis: Same    PHYSICIAN: Bucky Paz MD    MEDICATIONS INJECTED: 0.25% bupivicaine, 1ml at each level    LOCAL ANESTHETIC USED: Lidocaine 1%, 2ml at each level    SEDATION MEDICATIONS:4mg versed    ESTIMATED BLOOD LOSS:  none    COMPLICATIONS:  none    TECHNIQUE: A time out was taken to identify the patient, procedure and side of the procedure. The patient was placed in a prone position, then prepped and draped in the usual sterile fashion using ChloraPrep and sterile towels.  The levels were determined under fluoroscopic guidance and then marked.  Local anesthetic was given by raising a wheal at the skin over each site and then infiltrated approximately 2cm deeper.  A 22-gauge 3.5 inch needle was introduced to the anatomic location of the bilateral L3,4,5 medial branch nerves on the bilateal side.  Appropriate location and medication spread confirmed by injecting 0.5ml of Omnipaque. The above medication was then injected. The patient tolerated the procedure well.     The patient was monitored after the procedure. The patient will be contacted in the next few days to determine extent of relief.  Patient was given post procedure and discharge instructions to follow at home.  The patient was discharged in a stable condition.

## 2018-04-03 ENCOUNTER — TELEPHONE (OUTPATIENT)
Dept: PAIN MEDICINE | Facility: CLINIC | Age: 58
End: 2018-04-03

## 2018-04-03 NOTE — TELEPHONE ENCOUNTER
Let the patient know that we cannot proceed without a percentage.  Try asking him what his pain level was before the injection and what his pain level was after the injection.

## 2018-04-03 NOTE — TELEPHONE ENCOUNTER
----- Message from Ramona Craft sent at 4/3/2018  1:31 PM CDT -----  Contact: self  Patient [phone] had a procedure last week by Dr Bucky Paz and he is calling to let the nurse know how he is doing/please call patient to discuss

## 2018-04-03 NOTE — TELEPHONE ENCOUNTER
Spoke with patient regarding block. He stated this was done so late in the day that he did not go home and do the activities that irritate the pain. He stated the pain did go away and when asked several times a percentage of relief he could not give me one. He also told me this lasted about 4 hours. Please advise on how to proceed.

## 2018-04-04 DIAGNOSIS — M47.816 LUMBAR SPONDYLOSIS: Primary | ICD-10-CM

## 2018-04-04 RX ORDER — SODIUM CHLORIDE, SODIUM LACTATE, POTASSIUM CHLORIDE, CALCIUM CHLORIDE 600; 310; 30; 20 MG/100ML; MG/100ML; MG/100ML; MG/100ML
INJECTION, SOLUTION INTRAVENOUS CONTINUOUS
Status: CANCELLED | OUTPATIENT
Start: 2018-05-01

## 2018-05-01 ENCOUNTER — SURGERY (OUTPATIENT)
Age: 58
End: 2018-05-01

## 2018-05-01 ENCOUNTER — HOSPITAL ENCOUNTER (OUTPATIENT)
Dept: RADIOLOGY | Facility: HOSPITAL | Age: 58
Discharge: HOME | End: 2018-05-01
Attending: ANESTHESIOLOGY | Admitting: ANESTHESIOLOGY
Payer: MEDICARE

## 2018-05-01 ENCOUNTER — HOSPITAL ENCOUNTER (OUTPATIENT)
Facility: HOSPITAL | Age: 58
Discharge: HOME | End: 2018-05-01
Attending: ANESTHESIOLOGY | Admitting: ANESTHESIOLOGY
Payer: MEDICARE

## 2018-05-01 VITALS
SYSTOLIC BLOOD PRESSURE: 167 MMHG | HEIGHT: 68 IN | OXYGEN SATURATION: 98 % | HEART RATE: 88 BPM | TEMPERATURE: 99 F | RESPIRATION RATE: 18 BRPM | WEIGHT: 189 LBS | DIASTOLIC BLOOD PRESSURE: 80 MMHG | BODY MASS INDEX: 28.64 KG/M2

## 2018-05-01 DIAGNOSIS — M54.50 LUMBAR BACK PAIN: ICD-10-CM

## 2018-05-01 DIAGNOSIS — M47.816 LUMBAR SPONDYLOSIS: Primary | ICD-10-CM

## 2018-05-01 PROCEDURE — 25500020 PHARM REV CODE 255: Mod: PO | Performed by: ANESTHESIOLOGY

## 2018-05-01 PROCEDURE — 64494 INJ PARAVERT F JNT L/S 2 LEV: CPT | Mod: 50,PO | Performed by: ANESTHESIOLOGY

## 2018-05-01 PROCEDURE — 64493 INJ PARAVERT F JNT L/S 1 LEV: CPT | Mod: 50,PO | Performed by: ANESTHESIOLOGY

## 2018-05-01 PROCEDURE — 25000003 PHARM REV CODE 250: Mod: PO | Performed by: ANESTHESIOLOGY

## 2018-05-01 PROCEDURE — 99152 MOD SED SAME PHYS/QHP 5/>YRS: CPT | Mod: ,,, | Performed by: ANESTHESIOLOGY

## 2018-05-01 PROCEDURE — 64495 INJ PARAVERT F JNT L/S 3 LEV: CPT | Mod: 50,PO | Performed by: ANESTHESIOLOGY

## 2018-05-01 PROCEDURE — 64494 INJ PARAVERT F JNT L/S 2 LEV: CPT | Mod: 50,,, | Performed by: ANESTHESIOLOGY

## 2018-05-01 PROCEDURE — 76000 FLUOROSCOPY <1 HR PHYS/QHP: CPT | Mod: TC,PO

## 2018-05-01 PROCEDURE — 64493 INJ PARAVERT F JNT L/S 1 LEV: CPT | Mod: 50,,, | Performed by: ANESTHESIOLOGY

## 2018-05-01 RX ORDER — SODIUM CHLORIDE, SODIUM LACTATE, POTASSIUM CHLORIDE, CALCIUM CHLORIDE 600; 310; 30; 20 MG/100ML; MG/100ML; MG/100ML; MG/100ML
INJECTION, SOLUTION INTRAVENOUS CONTINUOUS
Status: DISCONTINUED | OUTPATIENT
Start: 2018-05-01 | End: 2018-05-01

## 2018-05-01 RX ORDER — MIDAZOLAM HYDROCHLORIDE 2 MG/2ML
2 INJECTION, SOLUTION INTRAMUSCULAR; INTRAVENOUS ONCE
Status: DISCONTINUED | OUTPATIENT
Start: 2018-05-01 | End: 2018-05-01 | Stop reason: HOSPADM

## 2018-05-01 RX ORDER — LIDOCAINE HYDROCHLORIDE 10 MG/ML
INJECTION, SOLUTION EPIDURAL; INFILTRATION; INTRACAUDAL; PERINEURAL
Status: DISCONTINUED | OUTPATIENT
Start: 2018-05-01 | End: 2018-05-01 | Stop reason: HOSPADM

## 2018-05-01 RX ORDER — BUPIVACAINE HYDROCHLORIDE 2.5 MG/ML
INJECTION, SOLUTION EPIDURAL; INFILTRATION; INTRACAUDAL
Status: DISCONTINUED | OUTPATIENT
Start: 2018-05-01 | End: 2018-05-01 | Stop reason: HOSPADM

## 2018-05-01 RX ADMIN — IOHEXOL 3 ML: 300 INJECTION, SOLUTION INTRAVENOUS at 04:05

## 2018-05-01 RX ADMIN — LIDOCAINE HYDROCHLORIDE 10 ML: 10 INJECTION, SOLUTION EPIDURAL; INFILTRATION; INTRACAUDAL; PERINEURAL at 04:05

## 2018-05-01 RX ADMIN — BUPIVACAINE HYDROCHLORIDE 8 ML: 2.5 INJECTION, SOLUTION EPIDURAL; INFILTRATION; INTRACAUDAL; PERINEURAL at 04:05

## 2018-05-01 NOTE — H&P
"CC: Back pain    HPI: The patient is a 56yo man with a history of lumbar spondylosis here for bilateral L3,4,5 medial branch block. There are no major changes in history and physical from 3/21/18.    Past Medical History:   Diagnosis Date    Coronary artery disease     stents x2 in heart    Gout     Hyperlipidemia     Hypertension        Past Surgical History:   Procedure Laterality Date    CORONARY ANGIOPLASTY WITH STENT PLACEMENT      X2    index finger amputation Left     MANDIBLE SURGERY      wiring    SHOULDER ARTHROSCOPY Right        History reviewed. No pertinent family history.    Social History     Social History    Marital status:      Spouse name: N/A    Number of children: N/A    Years of education: N/A     Social History Main Topics    Smoking status: Current Every Day Smoker     Packs/day: 2.00     Years: 30.00     Types: Cigarettes    Smokeless tobacco: Never Used    Alcohol use Yes      Comment: social    Drug use: No    Sexual activity: Not Asked     Other Topics Concern    None     Social History Narrative    None       No current facility-administered medications for this encounter.        Review of patient's allergies indicates:  No Known Allergies    Vitals:    05/01/18 1500 05/01/18 1533   BP:  130/82   Pulse:  91   Resp:  18   Temp:  99.1 °F (37.3 °C)   TempSrc:  Skin   SpO2:  95%   Weight: 85.7 kg (189 lb)    Height: 5' 8" (1.727 m)        REVIEW OF SYSTEMS:     GENERAL: No weight loss, malaise or fevers.  HEENT:  No recent changes in vision or hearing  NECK: Negative for lumps, no difficulty with swallowing.  RESPIRATORY: Negative for cough, wheezing or shortness of breath, patient denies any recent URI.  CARDIOVASCULAR: Negative for chest pain, leg swelling or palpitations.  GI: Negative for abdominal discomfort, blood in stools or black stools or change in bowel habits.  MUSCULOSKELETAL: See HPI.  SKIN: Negative for lesions, rash, and itching.  PSYCH: No suicidal " or homicidal ideations, no current mood disturbances.  HEMATOLOGY/LYMPHOLOGY: Negative for prolonged bleeding, bruising easily or swollen nodes. Patient is not currently taking any anti-coagulants  ENDO: No history of diabetes or thyroid dysfunction  NEURO: No history of syncope, paralysis, seizures or tremors.All other reviewed and negative other than HPI.    Physical exam:  Gen: A and O x3, pleasant, well-groomed  Skin: No rashes or obvious lesions  HEENT: PERRLA, no obvious deformities on ears or in canals. No thyroid masses, trachea midline, no palpable lymph nodes in neck, axilla.  CVS: Regular rate and rhythm, normal S1 and S2, no murmurs.  Resp: Clear to auscultation bilaterally.  Abdomen: Soft, NT/ND, normal bowel sounds present.  Musculoskeletal/Neuro: Moving all extremities    Assessment:  Lumbar spondylosis  -     Case Request Operating Room: BLOCK-NERVE-MEDIAL BRANCH-LUMBAR L3,4,5  -     Activity as tolerated; Standing  -     Place in Outpatient; Standing  -     Diet NPO; Standing  -     lactated ringers infusion; Inject into the vein continuous.  -     Notify physician ; Standing  -     Notify physician ; Standing  -     Notify physician (specify); Standing  -     Place 18-22 gauage peripheral IV ; Standing  -     Verify informed consent; Standing  -     Vital signs; Standing

## 2018-05-01 NOTE — DISCHARGE SUMMARY
[facility]  Discharge Note  Short Stay    Admit Date: 5/1/2018    Discharge Date: 5/1/2018    Attending Physician: Bucky Paz MD     Discharge Provider: Bucky Paz    Diagnoses:  Active Hospital Problems    Diagnosis  POA    *Lumbar spondylosis [M47.816]  Yes      Resolved Hospital Problems    Diagnosis Date Resolved POA   No resolved problems to display.       Discharged Condition: good    Final Diagnoses: Lumbar spondylosis [M47.816]    Disposition: Home or Self Care    Hospital Course: no complications, uneventful    Outcome of Hospitalization, Treatment, Procedure, or Surgery:  Patient was admitted for outpatient procedure. The patient underwent procedure without complications and are discharged home    Follow up/Patient Instructions:  Follow up as scheduled/Patient has received instructions and follow up date    Medications:  Continue previous medications      Discharge Procedure Orders  Call MD for:  temperature >100.4     Call MD for:  severe uncontrolled pain     Call MD for:  redness, tenderness, or signs of infection (pain, swelling, redness, odor or green/yellow discharge around incision site)     Call MD for:  severe persistent headache     No dressing needed           Discharge Procedure Orders (must include Diet, Follow-up, Activity):    Discharge Procedure Orders (must include Diet, Follow-up, Activity)  Call MD for:  temperature >100.4     Call MD for:  severe uncontrolled pain     Call MD for:  redness, tenderness, or signs of infection (pain, swelling, redness, odor or green/yellow discharge around incision site)     Call MD for:  severe persistent headache     No dressing needed

## 2018-05-01 NOTE — PLAN OF CARE
Patient tolerating oral liquids without difficulty. No apparent s&s of distress noted at this time, no complaints voiced at this time. Injection site free from redness and drainage.  Discharge instructions reviewed with patient/family/friend with good verbal feedback received. Patient ready for discharge

## 2018-05-01 NOTE — OP NOTE
PROCEDURE DATE: 5/1/2018    PROCEDURE:  Bilateral L3,4,5 medial branch nerve block on the bilateral-side    DIAGNOSIS:  Lumbar spondylosis    Post Op diagnosis: Same    PHYSICIAN: Bucky Paz MD    MEDICATIONS INJECTED: 0.25% bupivicaine, 1ml at each level    LOCAL ANESTHETIC USED: Lidocaine 1%, 2ml at each level    SEDATION MEDICATIONS:4mg versed    ESTIMATED BLOOD LOSS:  none    COMPLICATIONS:  none    TECHNIQUE: A time out was taken to identify the patient, procedure and side of the procedure. The patient was placed in a prone position, then prepped and draped in the usual sterile fashion using ChloraPrep and sterile towels.  The levels were determined under fluoroscopic guidance and then marked.  Local anesthetic was given by raising a wheal at the skin over each site and then infiltrated approximately 2cm deeper.  A 22-gauge 3.5 inch needle was introduced to the anatomic location of the right and then left L3,4,5 medial branch nerves on the bilateral side.  Appropriate location and medication spread confirmed by injecting 0.5ml of Omnipaque. The above medication was then injected. The patient tolerated the procedure well.     The patient was monitored after the procedure. The patient will be contacted in the next few days to determine extent of relief.  Patient was given post procedure and discharge instructions to follow at home.  The patient was discharged in a stable condition.

## 2018-05-01 NOTE — DISCHARGE INSTRUCTIONS
Home care instructions  Apply ice pack to the injection site for 20 minutes periods for the first 24 hrs for soreness/discomfort at injection site DO NOT USE HEAT FOR 24 HOURS  Keep site clean and dry for 24 hours, remove bandaid when desired  Do not drive until tomorrow  Take care when walking after a lumbar injection  Resume home medication as prescribed today  Resume normal activities that cause pain today and Dr. Paz's nurse will call you tomorrow.    SEE IMMEDIATE MEDICAL HELP FOR:  Severe increase in your usual pain or appearance of new pain  Prolonged or increasing weakness or numbness in the legs or arms  Drainage, redness, active bleeding, or increased swelling at the injection site  Temperature over 100.0 degrees F.  Headache that increases when your head is upright and decreases when you lie flat    CALL 911 OR GO DIRECTLY TO EMERGENCY DEPARTMENT FOR:        Recovery After Procedural Sedation (Adult)  You have been given medicine by vein to make you sleep during your surgery. This may have included both a pain medicine and sleeping medicine. Most of the effects have worn off. But you may still have some drowsiness for the next 6 to 8 hours.  Home care  Follow these guidelines when you get home:  · For the next 8 hours, you should be watched by a responsible adult. This person should make sure your condition is not getting worse.  · Don't drink any alcohol for the next 24 hours.  · Don't drive, operate dangerous machinery, or make important business or personal decisions during the next 24 hours.  Note: Your healthcare provider may tell you not to take any medicine by mouth for pain or sleep in the next 4 hours. These medicines may react with the medicines you were given in the hospital. This could cause a much stronger response than usual.  Follow-up care  Follow up with your healthcare provider if you are not alert and back to your usual level of activity within 12 hours.  When to seek medical  advice  Call your healthcare provider right away if any of these occur:  · Drowsiness gets worse  · Weakness or dizziness gets worse  · Repeated vomiting  · You can't be awakened   Date Last Reviewed: 10/18/2016  © 8144-5130 [employer]. [address] [place] [address], [place] [address]. All rights reserved. This information is not intended as a substitute for professional medical care. Always follow your healthcare professional's instructions.        Shortness of breath, chest pain, or problems breathing

## 2018-05-03 ENCOUNTER — TELEPHONE (OUTPATIENT)
Dept: PAIN MEDICINE | Facility: CLINIC | Age: 58
End: 2018-05-03

## 2018-05-03 DIAGNOSIS — M47.816 LUMBAR SPONDYLOSIS: Primary | ICD-10-CM

## 2018-05-03 RX ORDER — SODIUM CHLORIDE, SODIUM LACTATE, POTASSIUM CHLORIDE, CALCIUM CHLORIDE 600; 310; 30; 20 MG/100ML; MG/100ML; MG/100ML; MG/100ML
INJECTION, SOLUTION INTRAVENOUS CONTINUOUS
Status: CANCELLED | OUTPATIENT
Start: 2018-05-22

## 2018-05-03 NOTE — TELEPHONE ENCOUNTER
----- Message from Nery Pearson sent at 5/3/2018  3:18 PM CDT -----  Contact: self [phone]  States that he is returning call. Please call back at [phone]//thank you acc

## 2018-05-03 NOTE — TELEPHONE ENCOUNTER
Spoke with patient. He stated he felt pretty good after block. He stated he received about 80% and this lasted about 4-5 hours. He stated was able to walk a longer distance without pain. RFA scheduled for 5/22 with 4 week follow up. Pre-op instructions reviewed and sent to patient. He also stated he is having pain between his shoulders and stated he cannot go through another 3 month process for this pain. Please advise.

## 2018-05-08 NOTE — TELEPHONE ENCOUNTER
I can continue to help him with procedures but I would not give him any opioid pain medication anymore because he has overused this several times. If he is not satisfied with our care, we will provide him with other area providers.

## 2018-05-09 NOTE — TELEPHONE ENCOUNTER
Spoke with patient regarding this. He understands that we will not be prescribing opioids but he would like something else for the pain. He did state in the past that gabapentin did not work for him. Please advise.

## 2018-05-14 RX ORDER — PREGABALIN 50 MG/1
50 CAPSULE ORAL 3 TIMES DAILY
Qty: 90 CAPSULE | Refills: 1 | Status: SHIPPED | OUTPATIENT
Start: 2018-05-14 | End: 2018-06-28

## 2018-05-14 NOTE — TELEPHONE ENCOUNTER
I will have him try Lyrica, I have started him on 50 mg 3 times daily, have him take this for the next several weeks and if not having any major side effects we can always gradually increase upward.

## 2018-05-18 DIAGNOSIS — M51.36 DDD (DEGENERATIVE DISC DISEASE), LUMBAR: Primary | ICD-10-CM

## 2018-05-22 ENCOUNTER — SURGERY (OUTPATIENT)
Age: 58
End: 2018-05-22

## 2018-05-22 ENCOUNTER — HOSPITAL ENCOUNTER (OUTPATIENT)
Dept: RADIOLOGY | Facility: HOSPITAL | Age: 58
Discharge: HOME | End: 2018-05-22
Attending: ANESTHESIOLOGY | Admitting: ANESTHESIOLOGY
Payer: MEDICARE

## 2018-05-22 ENCOUNTER — HOSPITAL ENCOUNTER (OUTPATIENT)
Facility: HOSPITAL | Age: 58
Discharge: HOME | End: 2018-05-22
Attending: ANESTHESIOLOGY | Admitting: ANESTHESIOLOGY
Payer: MEDICARE

## 2018-05-22 VITALS
SYSTOLIC BLOOD PRESSURE: 157 MMHG | TEMPERATURE: 98 F | HEART RATE: 65 BPM | RESPIRATION RATE: 14 BRPM | OXYGEN SATURATION: 97 % | DIASTOLIC BLOOD PRESSURE: 75 MMHG

## 2018-05-22 DIAGNOSIS — M51.36 DDD (DEGENERATIVE DISC DISEASE), LUMBAR: ICD-10-CM

## 2018-05-22 DIAGNOSIS — M47.816 LUMBAR SPONDYLOSIS: Primary | ICD-10-CM

## 2018-05-22 PROCEDURE — A4216 STERILE WATER/SALINE, 10 ML: HCPCS | Mod: PO | Performed by: ANESTHESIOLOGY

## 2018-05-22 PROCEDURE — 25000003 PHARM REV CODE 250: Mod: PO | Performed by: ANESTHESIOLOGY

## 2018-05-22 PROCEDURE — 63600175 PHARM REV CODE 636 W HCPCS: Mod: PO | Performed by: ANESTHESIOLOGY

## 2018-05-22 PROCEDURE — 76000 FLUOROSCOPY <1 HR PHYS/QHP: CPT | Mod: TC,PO

## 2018-05-22 PROCEDURE — 99152 MOD SED SAME PHYS/QHP 5/>YRS: CPT | Mod: ,,, | Performed by: ANESTHESIOLOGY

## 2018-05-22 PROCEDURE — 64636 DESTROY L/S FACET JNT ADDL: CPT | Mod: 50,PO | Performed by: ANESTHESIOLOGY

## 2018-05-22 PROCEDURE — 64636 DESTROY L/S FACET JNT ADDL: CPT | Mod: 50,,, | Performed by: ANESTHESIOLOGY

## 2018-05-22 PROCEDURE — 64635 DESTROY LUMB/SAC FACET JNT: CPT | Mod: 50,PO | Performed by: ANESTHESIOLOGY

## 2018-05-22 PROCEDURE — 64635 DESTROY LUMB/SAC FACET JNT: CPT | Mod: 50,,, | Performed by: ANESTHESIOLOGY

## 2018-05-22 RX ORDER — LIDOCAINE HYDROCHLORIDE 20 MG/ML
INJECTION, SOLUTION EPIDURAL; INFILTRATION; INTRACAUDAL; PERINEURAL
Status: DISCONTINUED | OUTPATIENT
Start: 2018-05-22 | End: 2018-05-22 | Stop reason: HOSPADM

## 2018-05-22 RX ORDER — FENTANYL CITRATE 50 UG/ML
INJECTION, SOLUTION INTRAMUSCULAR; INTRAVENOUS
Status: DISCONTINUED | OUTPATIENT
Start: 2018-05-22 | End: 2018-05-22 | Stop reason: HOSPADM

## 2018-05-22 RX ORDER — METHYLPREDNISOLONE ACETATE 40 MG/ML
INJECTION, SUSPENSION INTRA-ARTICULAR; INTRALESIONAL; INTRAMUSCULAR; SOFT TISSUE
Status: DISCONTINUED | OUTPATIENT
Start: 2018-05-22 | End: 2018-05-22 | Stop reason: HOSPADM

## 2018-05-22 RX ORDER — MIDAZOLAM HYDROCHLORIDE 2 MG/2ML
INJECTION, SOLUTION INTRAMUSCULAR; INTRAVENOUS
Status: DISCONTINUED | OUTPATIENT
Start: 2018-05-22 | End: 2018-05-22 | Stop reason: HOSPADM

## 2018-05-22 RX ORDER — LIDOCAINE HYDROCHLORIDE 10 MG/ML
INJECTION, SOLUTION EPIDURAL; INFILTRATION; INTRACAUDAL; PERINEURAL
Status: DISCONTINUED | OUTPATIENT
Start: 2018-05-22 | End: 2018-05-22 | Stop reason: HOSPADM

## 2018-05-22 RX ORDER — SODIUM CHLORIDE, SODIUM LACTATE, POTASSIUM CHLORIDE, CALCIUM CHLORIDE 600; 310; 30; 20 MG/100ML; MG/100ML; MG/100ML; MG/100ML
INJECTION, SOLUTION INTRAVENOUS CONTINUOUS
Status: DISCONTINUED | OUTPATIENT
Start: 2018-05-22 | End: 2018-05-22 | Stop reason: HOSPADM

## 2018-05-22 RX ORDER — SODIUM CHLORIDE 9 MG/ML
INJECTION, SOLUTION INTRAMUSCULAR; INTRAVENOUS; SUBCUTANEOUS
Status: DISCONTINUED | OUTPATIENT
Start: 2018-05-22 | End: 2018-05-22 | Stop reason: HOSPADM

## 2018-05-22 RX ADMIN — METHYLPREDNISOLONE ACETATE 40 MG: 40 INJECTION, SUSPENSION INTRA-ARTICULAR; INTRALESIONAL; INTRAMUSCULAR; SOFT TISSUE at 02:05

## 2018-05-22 RX ADMIN — MIDAZOLAM HYDROCHLORIDE 1 MG: 1 INJECTION, SOLUTION INTRAMUSCULAR; INTRAVENOUS at 01:05

## 2018-05-22 RX ADMIN — SODIUM CHLORIDE, SODIUM LACTATE, POTASSIUM CHLORIDE, CALCIUM CHLORIDE: 600; 310; 30; 20 INJECTION, SOLUTION INTRAVENOUS at 01:05

## 2018-05-22 RX ADMIN — LIDOCAINE HYDROCHLORIDE 5 ML: 10 INJECTION, SOLUTION EPIDURAL; INFILTRATION; INTRACAUDAL; PERINEURAL at 02:05

## 2018-05-22 RX ADMIN — MIDAZOLAM HYDROCHLORIDE 2 MG: 1 INJECTION, SOLUTION INTRAMUSCULAR; INTRAVENOUS at 01:05

## 2018-05-22 RX ADMIN — FENTANYL CITRATE 25 MCG: 50 INJECTION, SOLUTION INTRAMUSCULAR; INTRAVENOUS at 02:05

## 2018-05-22 RX ADMIN — LIDOCAINE HYDROCHLORIDE 4 ML: 20 INJECTION, SOLUTION EPIDURAL; INFILTRATION; INTRACAUDAL; PERINEURAL at 02:05

## 2018-05-22 RX ADMIN — SODIUM CHLORIDE 3 ML: 9 INJECTION INTRAMUSCULAR; INTRAVENOUS; SUBCUTANEOUS at 02:05

## 2018-05-22 NOTE — DISCHARGE SUMMARY
[facility]  Discharge Note  Short Stay    Admit Date: 5/22/2018    Discharge Date: 5/22/2018    Attending Physician: Bucky Paz MD     Discharge Provider: Bucky Paz    Diagnoses:  Active Hospital Problems    Diagnosis  POA    *Lumbar spondylosis [M47.816]  Yes      Resolved Hospital Problems    Diagnosis Date Resolved POA   No resolved problems to display.       Discharged Condition: good    Final Diagnoses: Lumbar spondylosis [M47.816]    Disposition: Home or Self Care    Hospital Course: no complications, uneventful    Outcome of Hospitalization, Treatment, Procedure, or Surgery:  Patient was admitted for outpatient procedure. The patient underwent procedure without complications and are discharged home    Follow up/Patient Instructions:  Follow up as scheduled in Pain Management clinic in 3-4 weeks/Patient has received instructions and follow up date and time    Medications:  Continue previous medications      Discharge Procedure Orders  Call MD for:  temperature >100.4     Call MD for:  severe uncontrolled pain     Call MD for:  redness, tenderness, or signs of infection (pain, swelling, redness, odor or green/yellow discharge around incision site)     Call MD for:  severe persistent headache     No dressing needed           Discharge Procedure Orders (must include Diet, Follow-up, Activity):    Discharge Procedure Orders (must include Diet, Follow-up, Activity)  Call MD for:  temperature >100.4     Call MD for:  severe uncontrolled pain     Call MD for:  redness, tenderness, or signs of infection (pain, swelling, redness, odor or green/yellow discharge around incision site)     Call MD for:  severe persistent headache     No dressing needed

## 2018-05-22 NOTE — DISCHARGE INSTRUCTIONS
Home care instructions  Apply ice pack to the injection site for 20 minutes periods for the first 24 hrs for soreness/discomfort at injection site DO NOT USE HEAT FOR 24 HOURS  Keep site clean and dry for 24 hours, remove bandaid when desired  Do not drive until tomorrow  Return to normal activities. Pain clinic will call to see how the block is working.  Make take 2 weeks to feel the full effects   Resume home medication as prescribed today  Resume Aspirin, Plavix, or Coumadin the day after the procedure unless otherwise instructed.    SEE IMMEDIATE MEDICAL HELP FOR:  Severe increase in your usual pain or appearance of new pain  Prolonged or increasing weakness or numbness in the legs or arms  Drainage, redness, active bleeding, or increased swelling at the injection site  Temperature over 100.0 degrees F.  Headache that increases when your head is upright and decreases when you lie flat    CALL 911 OR GO DIRECTLY TO EMERGENCY DEPARTMENT FOR:  Shortness of breath, chest pain, or problems breathing

## 2018-05-22 NOTE — OP NOTE
PROCEDURE DATE: 5/22/2018    PROCEDURE:  Radiofrequency ablation of the bilateral L3,4,5 medial branch nerves on the bilateral-side utilizing fluoroscopy    DIAGNOSIS:  Lumbar spondylosis    Post op Diagnosis: Same    PHYSICIAN: Bucky Paz MD    MEDICATIONS INJECTED:  From a mixture of 4ml of 2% lidocaine and 40mg of methylprednisone, 1ml of this solution was injected at each level.    LOCAL ANESTHETIC USED: Lidocaine 1%, 3 ml given at each site.    SEDATION MEDICATIONS: 4mg versed, 50mcg fentanyl    ESTIMATED BLOOD LOSS:  none    COMPLICATIONS:  none    TECHNIQUE:  A time out was taken to identify patient and procedure side prior to starting the procedure. Laying in a prone position, the patient was prepped and draped in the usual sterile fashion using ChloraPrep and sterile towels.  The levels were determined under fluoroscopic guidance and then marked.  Local anesthetic was given by raising a wheal at the skin over each site and then infiltrated approximately 2cm deeper.  A 20-gauge  100 mm [employer] RF needle was introduced to the anatomic location of the right and then left L3,4,5 medial branch nerves.  Motor stimulation up to 2 Volts at each level confirmed no motor nerve involvement.  Impedance was less than 800 ohms at each level. The above noted medication was then injected slowly.  Ablation was performed per level utilizing Inge radiofrequency generator 80°C for 90 seconds. The patient tolerated the procedure well.     The patient was monitored after the procedure.  Patient was given post procedure and discharge instructions to follow at home.  The patient was discharged in a stable condition

## 2018-05-22 NOTE — H&P
CC: Neck pain    HPI: The patient is a 56yo man with a history of lumbar spondylosis here for bilateral L3,4,5 medial branch RFA. There are no major changes in history and physical from 3/21/18.    Past Medical History:   Diagnosis Date    Arthritis     Coronary artery disease     stents x2 in heart    Gout     Hyperlipidemia     Hypertension        Past Surgical History:   Procedure Laterality Date    CORONARY ANGIOPLASTY WITH STENT PLACEMENT      X2    index finger amputation Left     MANDIBLE SURGERY      wiring    SHOULDER ARTHROSCOPY Right        History reviewed. No pertinent family history.    Social History     Social History    Marital status:      Spouse name: N/A    Number of children: N/A    Years of education: N/A     Social History Main Topics    Smoking status: Current Every Day Smoker     Packs/day: 2.00     Years: 30.00     Types: Cigarettes    Smokeless tobacco: Never Used    Alcohol use No      Comment: social    Drug use: No    Sexual activity: Not Asked     Other Topics Concern    None     Social History Narrative    None       No current facility-administered medications for this encounter.        Review of patient's allergies indicates:  No Known Allergies    Vitals:    05/22/18 1252   BP: 130/85   Pulse: 68   Resp: 14   Temp: 97.7 °F (36.5 °C)   TempSrc: Skin   SpO2: 98%       REVIEW OF SYSTEMS:     GENERAL: No weight loss, malaise or fevers.  HEENT:  No recent changes in vision or hearing  NECK: Negative for lumps, no difficulty with swallowing.  RESPIRATORY: Negative for cough, wheezing or shortness of breath, patient denies any recent URI.  CARDIOVASCULAR: Negative for chest pain, leg swelling or palpitations.  GI: Negative for abdominal discomfort, blood in stools or black stools or change in bowel habits.  MUSCULOSKELETAL: See HPI.  SKIN: Negative for lesions, rash, and itching.  PSYCH: No suicidal or homicidal ideations, no current mood  disturbances.  HEMATOLOGY/LYMPHOLOGY: Negative for prolonged bleeding, bruising easily or swollen nodes. Patient is not currently taking any anti-coagulants  ENDO: No history of diabetes or thyroid dysfunction  NEURO: No history of syncope, paralysis, seizures or tremors.All other reviewed and negative other than HPI.    Physical exam:  Gen: A and O x3, pleasant, well-groomed  Skin: No rashes or obvious lesions  HEENT: PERRLA, no obvious deformities on ears or in canals. No thyroid masses, trachea midline, no palpable lymph nodes in neck, axilla.  CVS: Regular rate and rhythm, normal S1 and S2, no murmurs.  Resp: Clear to auscultation bilaterally.  Abdomen: Soft, NT/ND, normal bowel sounds present.  Musculoskeletal/Neuro: Moving all extremities    Assessment:  Lumbar spondylosis  -     Case Request Operating Room: RADIOFREQUENCY THERMOCOAGULATION (RFTC)-NERVE-MEDIAN BRANCH-LUMBAR L3, L4, L5  -     Activity as tolerated; Standing  -     Place in Outpatient; Standing  -     Diet NPO; Standing  -     lactated ringers infusion; Inject into the vein continuous.  -     Notify physician ; Standing  -     Notify physician ; Standing  -     Notify physician (specify); Standing  -     Place 18-22 gauage peripheral IV ; Standing  -     Verify informed consent; Standing  -     Vital signs; Standing

## 2018-06-01 RX ORDER — GABAPENTIN 300 MG/1
CAPSULE ORAL
Qty: 150 CAPSULE | Refills: 0 | OUTPATIENT
Start: 2018-06-01

## 2018-06-26 ENCOUNTER — TELEPHONE (OUTPATIENT)
Dept: PAIN MEDICINE | Facility: CLINIC | Age: 58
End: 2018-06-26

## 2018-06-26 NOTE — TELEPHONE ENCOUNTER
Patient is coming in on Thursday. He wants to discuss having a steroid injection done. He states that he is having discomfort in his hip when walking. He states that another provider wants to put screws in his hip to help. He doesn't want to have another RFA because he does not like that he had to wait so long to get the procedure. States he wants to discuss pain management options, procedures, medications, or other things to help him. He states the lyrica does not really help and it is expensive.

## 2018-06-26 NOTE — TELEPHONE ENCOUNTER
----- Message from Bridget Cummings sent at 6/26/2018  2:53 PM CDT -----  Please call patient in regards to medications.  Please call [phone].

## 2018-06-26 NOTE — TELEPHONE ENCOUNTER
Patient states that he is in pain and he needs injection. Not asking  For medication just needs something to help with the hip pain, Lyrica not helping.

## 2018-06-26 NOTE — TELEPHONE ENCOUNTER
----- Message from Ramona Craft sent at 6/26/2018  7:48 AM CDT -----  Contact: self  Hamlet Gunter booked out (was sick)/I tried to reschedule to next available which was 07 13 18 per nurse and patient stated he will not be back/he will find another doctor

## 2018-06-28 ENCOUNTER — OFFICE VISIT (OUTPATIENT)
Dept: PAIN MEDICINE | Facility: CLINIC | Age: 58
End: 2018-06-28
Payer: MEDICARE

## 2018-06-28 VITALS
HEART RATE: 57 BPM | TEMPERATURE: 97 F | WEIGHT: 206.38 LBS | SYSTOLIC BLOOD PRESSURE: 154 MMHG | BODY MASS INDEX: 31.38 KG/M2 | OXYGEN SATURATION: 98 % | DIASTOLIC BLOOD PRESSURE: 72 MMHG | RESPIRATION RATE: 20 BRPM

## 2018-06-28 DIAGNOSIS — M47.816 LUMBAR SPONDYLOSIS: ICD-10-CM

## 2018-06-28 DIAGNOSIS — M51.36 DDD (DEGENERATIVE DISC DISEASE), LUMBAR: ICD-10-CM

## 2018-06-28 DIAGNOSIS — M54.12 CERVICAL RADICULOPATHY: Primary | ICD-10-CM

## 2018-06-28 DIAGNOSIS — M50.30 DDD (DEGENERATIVE DISC DISEASE), CERVICAL: ICD-10-CM

## 2018-06-28 DIAGNOSIS — M19.049 HAND ARTHRITIS: ICD-10-CM

## 2018-06-28 PROCEDURE — 3077F SYST BP >= 140 MM HG: CPT | Mod: CPTII,S$GLB,, | Performed by: PHYSICIAN ASSISTANT

## 2018-06-28 PROCEDURE — 99213 OFFICE O/P EST LOW 20 MIN: CPT | Mod: S$GLB,,, | Performed by: PHYSICIAN ASSISTANT

## 2018-06-28 PROCEDURE — 3008F BODY MASS INDEX DOCD: CPT | Mod: CPTII,S$GLB,, | Performed by: PHYSICIAN ASSISTANT

## 2018-06-28 PROCEDURE — 3078F DIAST BP <80 MM HG: CPT | Mod: CPTII,S$GLB,, | Performed by: PHYSICIAN ASSISTANT

## 2018-06-28 PROCEDURE — 99999 PR PBB SHADOW E&M-EST. PATIENT-LVL III: CPT | Mod: PBBFAC,,, | Performed by: PHYSICIAN ASSISTANT

## 2018-06-28 RX ORDER — DICLOFENAC SODIUM 10 MG/G
2 GEL TOPICAL 2 TIMES DAILY PRN
Qty: 1 TUBE | Refills: 5 | Status: ON HOLD | OUTPATIENT
Start: 2018-06-28 | End: 2018-07-10 | Stop reason: CLARIF

## 2018-06-28 RX ORDER — SODIUM CHLORIDE, SODIUM LACTATE, POTASSIUM CHLORIDE, CALCIUM CHLORIDE 600; 310; 30; 20 MG/100ML; MG/100ML; MG/100ML; MG/100ML
INJECTION, SOLUTION INTRAVENOUS CONTINUOUS
Status: CANCELLED | OUTPATIENT
Start: 2018-07-10

## 2018-06-28 NOTE — PROGRESS NOTES
This note was completed with dictation software and grammatical errors may exist.    CC:  neck pain, back pain    HPI: The patient is a 57-year-old man with hypertension, gout, chronic tobacco use who presents in self-referral for neck pain radiating into the left arm and bilateral low back pain.  He is status post bilateral L3, 4 and 5 medial branch radiofrequency ablation on 5/22/18 with what sounds like 100% relief of his right low back pain and moderate relief of his left low back pain.  He complains of intermittent left low back pain that he describes as grinding, worse with walking and improved with sitting.  His main complaint is neck pain radiating to his left arm and in between his shoulder blades.  This is fairly constant.  He stopped taking Lyrica because it caused blurred vision and did not significantly help.  He complains of numbness in his left arm.  He complains of weakness in his legs.  He denies bladder or bowel incontinence.     Pain intervention history: He has done physical therapy several times without relief.  The patient had previously been seen by Dr. Alfonso Luciano and had received hydrocodone on a regular basis.  However, when the patient lost his insurance he ended up seeing 2 separate physicians who've charge cash only and was receiving 30 mg oxycodone and Opana in various doses.  Unclear why he is no longer seeing them but financial reasons may be a concern.  Patient states that he rarely leaves the house now.  He is status post bilateral L3, 4 and 5 medial branch radiofrequency ablation on 5/22/18 with what sounds like 100% relief of his right low back pain and moderate relief of his left low back pain.    ROS: He reports headaches, runny nose, appetite change, joint stiffness, back pain, difficulty sleeping, anxiety, depression and loss of balance.  Balance of review of systems is negative.    Past Medical History:   Diagnosis Date    Arthritis     Coronary artery disease     stents  x2 in heart    Gout     Hyperlipidemia     Hypertension        Past Surgical History:   Procedure Laterality Date    CORONARY ANGIOPLASTY WITH STENT PLACEMENT      X2    index finger amputation Left     MANDIBLE SURGERY      wiring    RADIOFREQUENCY ABLATION OF LUMBAR MEDIAL BRANCH NERVE AT SINGLE LEVEL Bilateral 5/22/2018    Procedure: RADIOFREQUENCY THERMOCOAGULATION (RFTC)-NERVE-MEDIAN BRANCH-LUMBAR L3, L4, L5;  Surgeon: Bucky Paz MD;  Location: [facility] OR;  Service: Pain Management;  Laterality: Bilateral;    SHOULDER ARTHROSCOPY Right        Social History     Social History    Marital status:      Spouse name: N/A    Number of children: N/A    Years of education: N/A     Social History Main Topics    Smoking status: Current Every Day Smoker     Packs/day: 2.00     Years: 30.00     Types: Cigarettes    Smokeless tobacco: Never Used    Alcohol use Yes      Comment: social    Drug use: No    Sexual activity: Not Asked     Other Topics Concern    None     Social History Narrative    None         Medications/Allergies: See med card    Vitals:    06/28/18 0910   BP: (!) 154/72   Pulse: (!) 57   Resp: 20   Temp: 97.2 °F (36.2 °C)   TempSrc: Oral   SpO2: 98%   Weight: 93.6 kg (206 lb 5.6 oz)   PainSc:   6   PainLoc: Hip         Physical exam:  Gen: A and O x3, pleasant, well-groomed, stale tobacco odor  Skin: No rashes or obvious lesions  HEENT: PERRLA, no obvious deformities on ears or in canals.Trachea midline.  CVS: Regular rate and rhythm, normal palpable pulses.  Resp: Clear to auscultation bilaterally, no wheezes or rales.  Abdomen: Soft, NT/ND.  Musculoskeletal: Slow cautious gait, using a walker.    Neuro:  Upper extremities: 5/5 strength bilaterally   Lower extremities: 5/5 strength bilaterally  Reflexes: Brachioradialis 1+, Bicep 2+, Tricep 2+. Patellar 0+, Achilles 0+ bilaterally.  Sensory: Intact and symmetrical to light touch and pinprick in C2-T1 dermatomes bilaterally,  except for left hand hypoesthesia. Intact and symmetrical to light touch and pinprick in L2-S1 dermatomes bilaterally.    Cervical Spine:  Cervical spine: Range of motion is mildly reduced with flexion with increased pain, moderately reduced with extension and lateral rotation to either side causing increased pain, worse on the left.  Spurling's maneuver causes neck pain to either side.  Myofascial exam: No Tenderness to palpation across cervical paraspinous region bilaterally.    Lumbar spine:  Lumbar spine: Range of motion is moderately reduced with flexion with Increased left low back pain.  Range of motion is severely reduced with extension with increased left low back pain.  Zion's test causes no increased pain on either side.    Supine straight leg raise is negative bilaterally.    Internal and external rotation of the hip causes no increased pain on either side.  Myofascial exam: No tenderness to palpation across lumbar paraspinous muscles.      Imagin17 CT Cspine  There multilevel cervical disc bulges and disc herniations.  No significant includes a calcified disc herniation at C5-C6 to the right of midline with severe right C5-C6 foraminal stenosis.  Loss of cervical disc space height at all levels throughout the cervical spine with marginal osteophytes present.  No acute displaced fracture or dislocation noted.  Hypertrophic facet arthrosis at all levels throughout the cervical spine.  A review of the paraspinous soft tissues otherwise demonstrates nothing usual.    MRI report 17: [facility].  At C2/3 there is central disc herniation posteriorly extending 2.8 mm posterior to the margin of the vertebral column.  There is no neural foraminal stenosis.  At C3/4 there is an annular bulge and the AP diameter of the canal is 7.4 mm.  There is bilateral uncinate spurring with moderate narrowing of the neural foramen bilaterally.  At C3/4 there is diffuse disc herniation and protrusion  with AP diameter of the canal 7.7 mm.  There is bilateral uncinate spurring with moderate foraminal narrowing bilaterally.  At C5/6 there is a right sided disc herniation resulting in moderate effacement of the right anterolateral aspect of the thecal sac.  The dimension of the canal is 9.4 mm.  There is bilateral uncinate spurring with severe narrowing of the neural foramen on the right and moderate to severe narrowing of the neural foramen on the left.  At C6/7 there is diffuse disc herniation and protrusion with AP dimension of the central spinal canal 9.4 mm.  There is bilateral uncinate spurring with moderate to severe narrowing of the neural foramen bilaterally.  At C7/T1 there is no focal disc herniation or protrusion.  There is no central spinal canal nor neural foraminal stenosis.    MRI lumbar spine report 3/31/17 [place] MRI: L1/2 through L3/4 is fairly normal.  At L4/5 demonstrates moderate to severe disc space narrowing, moderate disc bulging, mild foraminal narrowing bilaterally left greater than right with extra foraminal nerve root contact on the left.  At L5/S1 there is moderate severe disc space narrowing, moderate disc bulge, superimposed desiccated chronic appearing central disc protrusion, mild bilateral recess narrowing and contact of the descending S1 nerve roots bilaterally but without visible nerve root impingement extra foraminal nerve root contact bilaterally left greater than right.    Bilateral SI joint MRI 3/31/17: Normal exam.    Assessment:  The patient is a 57-year-old man with hypertension, gout, chronic tobacco use who presents in self-referral for neck pain radiating into the left arm and bilateral low back pain.     1. Cervical radiculopathy     2. DDD (degenerative disc disease), cervical     3. Lumbar spondylosis     4. DDD (degenerative disc disease), lumbar     5. Hand arthritis         Plan:  1.  The patient is no longer complaining of right low back pain following the  lumbar RFA but continues to have some left low back pain.  However, this is only present with walking for an extended period of time.  His main complaint today is neck pain radiating in between the shoulder blades and left arm pain.  I will schedule him for a cervical YARITZA.  He reports having this done last year with his previous pain management doctor but the dose of steroid was split between the cervical and lumbar spine.  We can repeat injection if he has some relief but not full.  2.  I provided a prescription for diclofenac gel for his arthritic hand pain.  3.  Follow-up in 4 weeks post procedure or sooner as needed.

## 2018-06-29 ENCOUNTER — TELEPHONE (OUTPATIENT)
Dept: PAIN MEDICINE | Facility: CLINIC | Age: 58
End: 2018-06-29

## 2018-06-29 NOTE — TELEPHONE ENCOUNTER
----- Message from Madan Almaraz sent at 6/29/2018 12:21 PM CDT -----  Contact: Adam   Calling because his pain medication is not covered by [employer]. He has much to discuss. He is only wanting to see Jackson going forward. Please call him [phone] (home)   Thanks!

## 2018-07-03 DIAGNOSIS — M50.30 DDD (DEGENERATIVE DISC DISEASE), CERVICAL: Primary | ICD-10-CM

## 2018-07-10 ENCOUNTER — HOSPITAL ENCOUNTER (OUTPATIENT)
Dept: RADIOLOGY | Facility: HOSPITAL | Age: 58
Discharge: HOME | End: 2018-07-10
Attending: PAIN MEDICINE | Admitting: PAIN MEDICINE
Payer: MEDICARE

## 2018-07-10 ENCOUNTER — HOSPITAL ENCOUNTER (OUTPATIENT)
Facility: HOSPITAL | Age: 58
Discharge: HOME | End: 2018-07-10
Attending: PAIN MEDICINE | Admitting: PAIN MEDICINE
Payer: MEDICARE

## 2018-07-10 ENCOUNTER — SURGERY (OUTPATIENT)
Age: 58
End: 2018-07-10

## 2018-07-10 VITALS
BODY MASS INDEX: 28.64 KG/M2 | OXYGEN SATURATION: 98 % | DIASTOLIC BLOOD PRESSURE: 80 MMHG | HEIGHT: 68 IN | SYSTOLIC BLOOD PRESSURE: 145 MMHG | TEMPERATURE: 98 F | HEART RATE: 67 BPM | WEIGHT: 189 LBS | RESPIRATION RATE: 16 BRPM

## 2018-07-10 DIAGNOSIS — M50.30 DDD (DEGENERATIVE DISC DISEASE), CERVICAL: ICD-10-CM

## 2018-07-10 DIAGNOSIS — M54.12 CERVICAL RADICULOPATHY: Primary | ICD-10-CM

## 2018-07-10 PROCEDURE — 62321 NJX INTERLAMINAR CRV/THRC: CPT | Mod: ,,, | Performed by: PAIN MEDICINE

## 2018-07-10 PROCEDURE — 25000003 PHARM REV CODE 250: Mod: PO | Performed by: PAIN MEDICINE

## 2018-07-10 PROCEDURE — 25500020 PHARM REV CODE 255: Mod: PO | Performed by: PAIN MEDICINE

## 2018-07-10 PROCEDURE — 25000003 PHARM REV CODE 250: Mod: PO | Performed by: ANESTHESIOLOGY

## 2018-07-10 PROCEDURE — 62321 NJX INTERLAMINAR CRV/THRC: CPT | Mod: PO | Performed by: PAIN MEDICINE

## 2018-07-10 PROCEDURE — 63600175 PHARM REV CODE 636 W HCPCS: Mod: PO | Performed by: PAIN MEDICINE

## 2018-07-10 PROCEDURE — 76000 FLUOROSCOPY <1 HR PHYS/QHP: CPT | Mod: TC,PO

## 2018-07-10 RX ORDER — SODIUM CHLORIDE, SODIUM LACTATE, POTASSIUM CHLORIDE, CALCIUM CHLORIDE 600; 310; 30; 20 MG/100ML; MG/100ML; MG/100ML; MG/100ML
INJECTION, SOLUTION INTRAVENOUS CONTINUOUS
Status: DISCONTINUED | OUTPATIENT
Start: 2018-07-10 | End: 2018-07-10 | Stop reason: HOSPADM

## 2018-07-10 RX ORDER — LIDOCAINE HYDROCHLORIDE 10 MG/ML
1 INJECTION, SOLUTION EPIDURAL; INFILTRATION; INTRACAUDAL; PERINEURAL ONCE
Status: COMPLETED | OUTPATIENT
Start: 2018-07-10 | End: 2018-07-10

## 2018-07-10 RX ORDER — LOSARTAN POTASSIUM 100 MG/1
100 TABLET ORAL DAILY
COMMUNITY

## 2018-07-10 RX ORDER — FENTANYL CITRATE 50 UG/ML
INJECTION, SOLUTION INTRAMUSCULAR; INTRAVENOUS
Status: DISCONTINUED | OUTPATIENT
Start: 2018-07-10 | End: 2018-07-10 | Stop reason: HOSPADM

## 2018-07-10 RX ORDER — LIDOCAINE HYDROCHLORIDE 10 MG/ML
INJECTION, SOLUTION EPIDURAL; INFILTRATION; INTRACAUDAL; PERINEURAL
Status: DISCONTINUED | OUTPATIENT
Start: 2018-07-10 | End: 2018-07-10 | Stop reason: HOSPADM

## 2018-07-10 RX ORDER — MIDAZOLAM HYDROCHLORIDE 2 MG/2ML
INJECTION, SOLUTION INTRAMUSCULAR; INTRAVENOUS
Status: DISCONTINUED | OUTPATIENT
Start: 2018-07-10 | End: 2018-07-10 | Stop reason: HOSPADM

## 2018-07-10 RX ORDER — DEXAMETHASONE SODIUM PHOSPHATE 4 MG/ML
INJECTION, SOLUTION INTRA-ARTICULAR; INTRALESIONAL; INTRAMUSCULAR; INTRAVENOUS; SOFT TISSUE
Status: DISCONTINUED | OUTPATIENT
Start: 2018-07-10 | End: 2018-07-10 | Stop reason: HOSPADM

## 2018-07-10 RX ADMIN — FENTANYL CITRATE 25 MCG: 50 INJECTION, SOLUTION INTRAMUSCULAR; INTRAVENOUS at 12:07

## 2018-07-10 RX ADMIN — IOHEXOL 3 ML: 300 INJECTION, SOLUTION INTRAVENOUS at 12:07

## 2018-07-10 RX ADMIN — SODIUM CHLORIDE, SODIUM LACTATE, POTASSIUM CHLORIDE, AND CALCIUM CHLORIDE: .6; .31; .03; .02 INJECTION, SOLUTION INTRAVENOUS at 12:07

## 2018-07-10 RX ADMIN — LIDOCAINE HYDROCHLORIDE 10 ML: 10 INJECTION, SOLUTION EPIDURAL; INFILTRATION; INTRACAUDAL; PERINEURAL at 12:07

## 2018-07-10 RX ADMIN — LIDOCAINE HYDROCHLORIDE 20 MG: 10 INJECTION, SOLUTION EPIDURAL; INFILTRATION; INTRACAUDAL; PERINEURAL at 12:07

## 2018-07-10 RX ADMIN — MIDAZOLAM HYDROCHLORIDE 2 MG: 1 INJECTION, SOLUTION INTRAMUSCULAR; INTRAVENOUS at 12:07

## 2018-07-10 RX ADMIN — DEXAMETHASONE SODIUM PHOSPHATE 15 MG: 4 INJECTION, SOLUTION INTRAMUSCULAR; INTRAVENOUS at 12:07

## 2018-07-10 NOTE — OP NOTE
"Procedure Note    Pre-operative Diagnosis: Cervical radiculopathy  Post-operative Diagnosis: Cervical radiculopathy  Procedure: (1) Cervical Epidural Steroid Injection at C7-T1 and (2) Intraoperative fluoroscopy  Procedure Date: 07/10/2018        Anesthesia: Local    Indications: To alleviate pain and suffering, and reduce functional impairment associated with cervical radiculopathy.    The patients history and physical exam were reviewed. The risks, benefits and alternatives to the procedure were discussed, and all questions were answered to the patients satisfaction. The patient agreed to proceed, and written informed consent was verified.    Procedure in Detail: The patient was brought into the procedure room and placed in the prone position on the fluoroscopy table. The area of the cervical spine was prepped with Chloraprep and draped in a sterile manner. The C7-T1 interspace was identified and marked under AP fluoroscopy. The skin and subcutaneous tissues overlying the targeted interspace were anesthetized with 3-5 mL of Lidocaine 1% using a 25G 1.5" needle. A 17G, 3.5" Tuohy epidural needle was inserted through the anesthetized skin and directed toward the interspace under fluoroscopic guidance until T1 lamina was contacted.  The fluoroscope was then adjusted to yield a contralateral oblique view of the C7-T1 interspace.  The epidural needle was incrementally walked cephalad off of the lamina until the ligamentum flavum was engaged. From this point, a loss-of-resistance technique to saline in a glass syringe was used to identify entrance of the needle into the epidural space. Once loss of resistance was observed 0.5 mL of contrast solution was injected live. An appropriate epidurogram was noted.    A 3 mL mixture consisting of saline, 2 mL 1% Lidocaine and 15 mg of Dexamethasone was injected slowly and without resistance.  The needle was removed and a bandage applied to puncture site.    Disposition: The " patient tolerated the procedure well, and there were no apparent complications. Vital signs remained stable throughout the procedure. The patient was taken to the recovery area where written discharge instructions for the procedure were given.     Follow-up: In clinic as scheduled      Kim Fuchs Jr, MD  Interventional Pain Medicine / Anesthesiology

## 2018-07-10 NOTE — PLAN OF CARE
Vital signs stable. All questions answered. Denies recent fever or illness. Patient states ready for planned procedure. Daughter in waiting room.

## 2018-07-10 NOTE — H&P (VIEW-ONLY)
This note was completed with dictation software and grammatical errors may exist.    CC:  neck pain, back pain    HPI: The patient is a 57-year-old man with hypertension, gout, chronic tobacco use who presents in self-referral for neck pain radiating into the left arm and bilateral low back pain.  He is status post bilateral L3, 4 and 5 medial branch radiofrequency ablation on 5/22/18 with what sounds like 100% relief of his right low back pain and moderate relief of his left low back pain.  He complains of intermittent left low back pain that he describes as grinding, worse with walking and improved with sitting.  His main complaint is neck pain radiating to his left arm and in between his shoulder blades.  This is fairly constant.  He stopped taking Lyrica because it caused blurred vision and did not significantly help.  He complains of numbness in his left arm.  He complains of weakness in his legs.  He denies bladder or bowel incontinence.     Pain intervention history: He has done physical therapy several times without relief.  The patient had previously been seen by Dr. Alfonso Luciano and had received hydrocodone on a regular basis.  However, when the patient lost his insurance he ended up seeing 2 separate physicians who've charge cash only and was receiving 30 mg oxycodone and Opana in various doses.  Unclear why he is no longer seeing them but financial reasons may be a concern.  Patient states that he rarely leaves the house now.  He is status post bilateral L3, 4 and 5 medial branch radiofrequency ablation on 5/22/18 with what sounds like 100% relief of his right low back pain and moderate relief of his left low back pain.    ROS: He reports headaches, runny nose, appetite change, joint stiffness, back pain, difficulty sleeping, anxiety, depression and loss of balance.  Balance of review of systems is negative.    Past Medical History:   Diagnosis Date    Arthritis     Coronary artery disease     stents  x2 in heart    Gout     Hyperlipidemia     Hypertension        Past Surgical History:   Procedure Laterality Date    CORONARY ANGIOPLASTY WITH STENT PLACEMENT      X2    index finger amputation Left     MANDIBLE SURGERY      wiring    RADIOFREQUENCY ABLATION OF LUMBAR MEDIAL BRANCH NERVE AT SINGLE LEVEL Bilateral 5/22/2018    Procedure: RADIOFREQUENCY THERMOCOAGULATION (RFTC)-NERVE-MEDIAN BRANCH-LUMBAR L3, L4, L5;  Surgeon: Bucky Paz MD;  Location: [facility] OR;  Service: Pain Management;  Laterality: Bilateral;    SHOULDER ARTHROSCOPY Right        Social History     Social History    Marital status:      Spouse name: N/A    Number of children: N/A    Years of education: N/A     Social History Main Topics    Smoking status: Current Every Day Smoker     Packs/day: 2.00     Years: 30.00     Types: Cigarettes    Smokeless tobacco: Never Used    Alcohol use Yes      Comment: social    Drug use: No    Sexual activity: Not Asked     Other Topics Concern    None     Social History Narrative    None         Medications/Allergies: See med card    Vitals:    06/28/18 0910   BP: (!) 154/72   Pulse: (!) 57   Resp: 20   Temp: 97.2 °F (36.2 °C)   TempSrc: Oral   SpO2: 98%   Weight: 93.6 kg (206 lb 5.6 oz)   PainSc:   6   PainLoc: Hip         Physical exam:  Gen: A and O x3, pleasant, well-groomed, stale tobacco odor  Skin: No rashes or obvious lesions  HEENT: PERRLA, no obvious deformities on ears or in canals.Trachea midline.  CVS: Regular rate and rhythm, normal palpable pulses.  Resp: Clear to auscultation bilaterally, no wheezes or rales.  Abdomen: Soft, NT/ND.  Musculoskeletal: Slow cautious gait, using a walker.    Neuro:  Upper extremities: 5/5 strength bilaterally   Lower extremities: 5/5 strength bilaterally  Reflexes: Brachioradialis 1+, Bicep 2+, Tricep 2+. Patellar 0+, Achilles 0+ bilaterally.  Sensory: Intact and symmetrical to light touch and pinprick in C2-T1 dermatomes bilaterally,  except for left hand hypoesthesia. Intact and symmetrical to light touch and pinprick in L2-S1 dermatomes bilaterally.    Cervical Spine:  Cervical spine: Range of motion is mildly reduced with flexion with increased pain, moderately reduced with extension and lateral rotation to either side causing increased pain, worse on the left.  Spurling's maneuver causes neck pain to either side.  Myofascial exam: No Tenderness to palpation across cervical paraspinous region bilaterally.    Lumbar spine:  Lumbar spine: Range of motion is moderately reduced with flexion with Increased left low back pain.  Range of motion is severely reduced with extension with increased left low back pain.  Zion's test causes no increased pain on either side.    Supine straight leg raise is negative bilaterally.    Internal and external rotation of the hip causes no increased pain on either side.  Myofascial exam: No tenderness to palpation across lumbar paraspinous muscles.      Imagin17 CT Cspine  There multilevel cervical disc bulges and disc herniations.  No significant includes a calcified disc herniation at C5-C6 to the right of midline with severe right C5-C6 foraminal stenosis.  Loss of cervical disc space height at all levels throughout the cervical spine with marginal osteophytes present.  No acute displaced fracture or dislocation noted.  Hypertrophic facet arthrosis at all levels throughout the cervical spine.  A review of the paraspinous soft tissues otherwise demonstrates nothing usual.    MRI report 17: [facility].  At C2/3 there is central disc herniation posteriorly extending 2.8 mm posterior to the margin of the vertebral column.  There is no neural foraminal stenosis.  At C3/4 there is an annular bulge and the AP diameter of the canal is 7.4 mm.  There is bilateral uncinate spurring with moderate narrowing of the neural foramen bilaterally.  At C3/4 there is diffuse disc herniation and protrusion  with AP diameter of the canal 7.7 mm.  There is bilateral uncinate spurring with moderate foraminal narrowing bilaterally.  At C5/6 there is a right sided disc herniation resulting in moderate effacement of the right anterolateral aspect of the thecal sac.  The dimension of the canal is 9.4 mm.  There is bilateral uncinate spurring with severe narrowing of the neural foramen on the right and moderate to severe narrowing of the neural foramen on the left.  At C6/7 there is diffuse disc herniation and protrusion with AP dimension of the central spinal canal 9.4 mm.  There is bilateral uncinate spurring with moderate to severe narrowing of the neural foramen bilaterally.  At C7/T1 there is no focal disc herniation or protrusion.  There is no central spinal canal nor neural foraminal stenosis.    MRI lumbar spine report 3/31/17 [place] MRI: L1/2 through L3/4 is fairly normal.  At L4/5 demonstrates moderate to severe disc space narrowing, moderate disc bulging, mild foraminal narrowing bilaterally left greater than right with extra foraminal nerve root contact on the left.  At L5/S1 there is moderate severe disc space narrowing, moderate disc bulge, superimposed desiccated chronic appearing central disc protrusion, mild bilateral recess narrowing and contact of the descending S1 nerve roots bilaterally but without visible nerve root impingement extra foraminal nerve root contact bilaterally left greater than right.    Bilateral SI joint MRI 3/31/17: Normal exam.    Assessment:  The patient is a 57-year-old man with hypertension, gout, chronic tobacco use who presents in self-referral for neck pain radiating into the left arm and bilateral low back pain.     1. Cervical radiculopathy     2. DDD (degenerative disc disease), cervical     3. Lumbar spondylosis     4. DDD (degenerative disc disease), lumbar     5. Hand arthritis         Plan:  1.  The patient is no longer complaining of right low back pain following the  lumbar RFA but continues to have some left low back pain.  However, this is only present with walking for an extended period of time.  His main complaint today is neck pain radiating in between the shoulder blades and left arm pain.  I will schedule him for a cervical YARITZA.  He reports having this done last year with his previous pain management doctor but the dose of steroid was split between the cervical and lumbar spine.  We can repeat injection if he has some relief but not full.  2.  I provided a prescription for diclofenac gel for his arthritic hand pain.  3.  Follow-up in 4 weeks post procedure or sooner as needed.

## 2018-07-10 NOTE — DISCHARGE INSTRUCTIONS
Home care instructions  Apply ice pack to the injection site for 20 minutes periods for the first 24 hrs for soreness/discomfort at injection site DO NOT USE HEAT FOR 24 HOURS  Keep site clean and dry for 24 hours, remove bandaid when desired  Do not drive until tomorrow  Take care when walking after a lumbar injection  Avoid strenuous activities for 2 days  Make take 2 weeks to feel the full effects   Resume home medication as prescribed today  Resume Aspirin, Plavix, or Coumadin the day after the procedure unless otherwise instructed.    SEE IMMEDIATE MEDICAL HELP FOR:  Severe increase in your usual pain or appearance of new pain  Prolonged or increasing weakness or numbness in the legs or arms  Drainage, redness, active bleeding, or increased swelling at the injection site  Temperature over 100.0 degrees F.  Headache that increases when your head is upright and decreases when you lie flat    CALL 911 OR GO DIRECTLY TO EMERGENCY DEPARTMENT FOR:  Shortness of breath, chest pain, or problems breathing        Recovery After Procedural Sedation (Adult)  You have been given medicine by vein to make you sleep during your surgery. This may have included both a pain medicine and sleeping medicine. Most of the effects have worn off. But you may still have some drowsiness for the next 6 to 8 hours.  Home care  Follow these guidelines when you get home:  · For the next 8 hours, you should be watched by a responsible adult. This person should make sure your condition is not getting worse.  · Don't drink any alcohol for the next 24 hours.  · Don't drive, operate dangerous machinery, or make important business or personal decisions during the next 24 hours.  Note: Your healthcare provider may tell you not to take any medicine by mouth for pain or sleep in the next 4 hours. These medicines may react with the medicines you were given in the hospital. This could cause a much stronger response than usual.  Follow-up care  Follow up  with your healthcare provider if you are not alert and back to your usual level of activity within 12 hours.  When to seek medical advice  Call your healthcare provider right away if any of these occur:  · Drowsiness gets worse  · Weakness or dizziness gets worse  · Repeated vomiting  · You can't be awakened   Date Last Reviewed: 10/18/2016  © 1983-9570 [employer]. [address] [place] [address], [place] [address]. All rights reserved. This information is not intended as a substitute for professional medical care. Always follow your healthcare professional's instructions.

## 2018-07-10 NOTE — DISCHARGE SUMMARY
[facility]  Discharge Note  Short Stay     Admit Date: 7/10/2018    Discharge Date: 7/10/2018     Attending Physician: Kim Fuchs Jr, MD    Diagnoses:  Active Hospital Problems    Diagnosis  POA    *Cervical radiculopathy [M54.12]  Yes      Resolved Hospital Problems    Diagnosis Date Resolved POA   No resolved problems to display.     Discharged Condition: Good     Hospital Course: Patient was admitted for an outpatient interventional pain management procedure and tolerated the procedure well with no complications.     Final Diagnoses: Same as principal problem.     Disposition: Home or Self Care     Follow up/Patient Instructions:    Follow-up Information     ROBER Moreland Go in 3 weeks.    Specialty:  Pain Medicine  Why:  Post-procedural Follow Up As Scheduled, Call to make an appointment if you do not have one  Contact information:  [address] [place] [address]  [phone]                   Reconciled Medications:     Medication List      CONTINUE taking these medications    allopurinol 300 MG tablet  Commonly known as:  ZYLOPRIM  Take 300 mg by mouth once daily.     aspirin 325 MG tablet  Take 325 mg by mouth once daily.     clopidogrel 75 mg tablet  Commonly known as:  PLAVIX  Take 75 mg by mouth once daily.     indomethacin 50 MG capsule  Commonly known as:  INDOCIN  Take 50 mg by mouth 3 (three) times daily.     * losartan 100 MG tablet  Commonly known as:  COZAAR  Take 100 mg by mouth once daily.     * losartan 100 MG tablet  Commonly known as:  COZAAR  Take 1 tablet (100 mg total) by mouth once daily.     metoprolol succinate 50 MG 24 hr tablet  Commonly known as:  TOPROL-XL  Take 50 mg by mouth once daily.     nitroGLYCERIN 0.4 MG SL tablet  Commonly known as:  NITROSTAT  Place 0.4 mg under the tongue every 5 (five) minutes as needed for Chest pain.     pravastatin 40 MG tablet  Commonly known as:  PRAVACHOL  Take 40 mg by mouth once daily.        * This list  has 2 medication(s) that are the same as other medications prescribed for you. Read the directions carefully, and ask your doctor or other care provider to review them with you.                Discharge Procedure Orders (must include Diet, Follow-up, Activity)  Call MD for:  temperature >100.4     Call MD for:  severe uncontrolled pain     Call MD for:  redness, tenderness, or signs of infection (pain, swelling, redness, odor or green/yellow discharge around incision site)     Call MD for:  difficulty breathing or increased cough     Call MD for:  severe persistent headache     Call MD for:  worsening rash     Remove dressing in 24 hours

## 2018-07-11 ENCOUNTER — TELEPHONE (OUTPATIENT)
Dept: PAIN MEDICINE | Facility: CLINIC | Age: 58
End: 2018-07-11

## 2018-07-11 NOTE — TELEPHONE ENCOUNTER
----- Message from Madan Almaraz sent at 7/10/2018  8:15 AM CDT -----  Contact: Daniel- [email or web address]  Submitted an appeal, but no diagnosis code was on the form. It will be denied again. Daniel advised, she can take a verbal. Please call her [phone] thanks!

## 2018-07-26 ENCOUNTER — TELEPHONE (OUTPATIENT)
Dept: PAIN MEDICINE | Facility: CLINIC | Age: 58
End: 2018-07-26

## 2018-07-26 NOTE — TELEPHONE ENCOUNTER
----- Message from Marie Bonilla sent at 7/26/2018  8:16 AM CDT -----  Contact: Patient  Adam, patient [phone] calling because he feels as if he is not getting any relief from seeing Dr. Paz, so he would like a release of medical treatment. Please advise. Thanks.

## 2018-07-26 NOTE — TELEPHONE ENCOUNTER
Spoke with the patient and he was advised that he is free to see whomever he chooses. They will need to request the medical records. No further questions.

## 2024-07-08 PROBLEM — R73.03 PREDIABETES: Status: ACTIVE | Noted: 2024-07-08

## 2024-07-08 PROBLEM — G47.00 INSOMNIA: Status: ACTIVE | Noted: 2024-07-08

## 2024-07-08 PROBLEM — I71.9 ATHEROSCLEROTIC ULCER OF AORTA: Status: ACTIVE | Noted: 2024-07-08

## 2024-07-08 PROBLEM — N40.0 BPH (BENIGN PROSTATIC HYPERPLASIA): Status: ACTIVE | Noted: 2024-07-08

## 2024-07-08 PROBLEM — Z71.89 ACP (ADVANCE CARE PLANNING): Status: ACTIVE | Noted: 2024-07-08

## 2024-07-08 PROBLEM — M10.9 GOUT: Status: ACTIVE | Noted: 2024-07-08

## 2024-07-08 PROBLEM — D72.829 LEUKOCYTOSIS: Status: ACTIVE | Noted: 2024-07-08

## 2024-07-08 PROBLEM — I25.10 CAD (CORONARY ARTERY DISEASE): Status: ACTIVE | Noted: 2024-07-08

## 2024-07-08 PROBLEM — I70.0 ATHEROSCLEROTIC ULCER OF AORTA: Status: ACTIVE | Noted: 2024-07-08

## 2024-07-10 ENCOUNTER — TELEPHONE (OUTPATIENT)
Dept: VASCULAR SURGERY | Facility: CLINIC | Age: 64
End: 2024-07-10
Payer: MEDICARE

## 2024-07-10 NOTE — TELEPHONE ENCOUNTER
Appt scheduled for 8/8.    ----- Message from Peir Lawton sent at 7/10/2024 11:33 AM CDT -----  Regarding: appt  Type:  Sooner Apoointment Request      Name of Caller:pt    When is the first available appointment?dept booked     Symptoms:1m fu       Best Call Back Number:[phone]      Additional Information: dr escobar pt is hospital in told him to schedule an 1m fu appt.  please call to discuss.

## 2024-08-08 ENCOUNTER — OFFICE VISIT (OUTPATIENT)
Dept: VASCULAR SURGERY | Facility: CLINIC | Age: 64
End: 2024-08-08
Payer: MEDICARE

## 2024-08-08 VITALS
BODY MASS INDEX: 27.46 KG/M2 | HEART RATE: 106 BPM | SYSTOLIC BLOOD PRESSURE: 131 MMHG | HEIGHT: 68 IN | DIASTOLIC BLOOD PRESSURE: 95 MMHG | WEIGHT: 181.19 LBS

## 2024-08-08 DIAGNOSIS — I71.9 ATHEROSCLEROTIC ULCER OF AORTA: ICD-10-CM

## 2024-08-08 DIAGNOSIS — I73.9 PERIPHERAL VASCULAR DISEASE, UNSPECIFIED: Primary | ICD-10-CM

## 2024-08-08 DIAGNOSIS — I70.0 ATHEROSCLEROTIC ULCER OF AORTA: ICD-10-CM

## 2024-08-08 DIAGNOSIS — Z72.0 TOBACCO USE: Primary | ICD-10-CM

## 2024-08-08 PROCEDURE — 99999 PR PBB SHADOW E&M-EST. PATIENT-LVL III: CPT | Mod: PBBFAC,,, | Performed by: STUDENT IN AN ORGANIZED HEALTH CARE EDUCATION/TRAINING PROGRAM

## 2024-08-09 ENCOUNTER — TELEPHONE (OUTPATIENT)
Dept: VASCULAR SURGERY | Facility: CLINIC | Age: 64
End: 2024-08-09
Payer: MEDICARE

## 2024-08-16 PROBLEM — Z98.890 S/P AAA (ABDOMINAL AORTIC ANEURYSM) REPAIR: Status: ACTIVE | Noted: 2024-08-16

## 2024-08-16 PROBLEM — Z86.79 S/P AAA (ABDOMINAL AORTIC ANEURYSM) REPAIR: Status: ACTIVE | Noted: 2024-08-16

## 2024-08-16 PROBLEM — K80.00 CALCULUS OF GALLBLADDER WITH ACUTE CHOLECYSTITIS WITHOUT OBSTRUCTION: Status: ACTIVE | Noted: 2024-08-16

## 2024-08-18 PROBLEM — M54.50 CHRONIC MIDLINE LOW BACK PAIN WITHOUT SCIATICA: Status: RESOLVED | Noted: 2018-03-05 | Resolved: 2024-08-18

## 2024-08-18 PROBLEM — G89.29 CHRONIC MIDLINE LOW BACK PAIN WITHOUT SCIATICA: Status: RESOLVED | Noted: 2018-03-05 | Resolved: 2024-08-18

## 2024-08-23 PROBLEM — D72.829 LEUKOCYTOSIS: Status: RESOLVED | Noted: 2024-07-08 | Resolved: 2024-08-23

## 2024-08-24 ENCOUNTER — HOSPITAL ENCOUNTER (INPATIENT)
Facility: HOSPITAL | Age: 64
LOS: 6 days | Discharge: ANOTHER HOSPITAL | DRG: 394 | End: 2024-08-30
Attending: SURGERY | Admitting: INTERNAL MEDICINE
Payer: MEDICARE

## 2024-08-24 DIAGNOSIS — R52 PAIN: ICD-10-CM

## 2024-08-24 DIAGNOSIS — N40.0 BENIGN PROSTATIC HYPERPLASIA, UNSPECIFIED WHETHER LOWER URINARY TRACT SYMPTOMS PRESENT: ICD-10-CM

## 2024-08-24 DIAGNOSIS — K80.00 CALCULUS OF GALLBLADDER WITH ACUTE CHOLECYSTITIS WITHOUT OBSTRUCTION: ICD-10-CM

## 2024-08-24 DIAGNOSIS — K83.9 BILE LEAK: Primary | ICD-10-CM

## 2024-08-24 DIAGNOSIS — I10 HYPERTENSION, UNSPECIFIED TYPE: ICD-10-CM

## 2024-08-24 DIAGNOSIS — K80.66 CALCULUS OF GALLBLADDER AND BILE DUCT WITH ACUTE ON CHRONIC CHOLECYSTITIS WITHOUT OBSTRUCTION: ICD-10-CM

## 2024-08-24 DIAGNOSIS — K80.60 CALCULUS OF GALLBLADDER AND BILE DUCT W/CHOLECYSTITIS W/O OBSTRUCTION: ICD-10-CM

## 2024-08-24 DIAGNOSIS — R07.9 CHEST PAIN: ICD-10-CM

## 2024-08-24 PROCEDURE — 63600175 PHARM REV CODE 636 W HCPCS: Performed by: INTERNAL MEDICINE

## 2024-08-24 PROCEDURE — 11000001 HC ACUTE MED/SURG PRIVATE ROOM

## 2024-08-24 PROCEDURE — 25000003 PHARM REV CODE 250: Performed by: INTERNAL MEDICINE

## 2024-08-24 RX ORDER — HYDROMORPHONE HYDROCHLORIDE 1 MG/ML
1 INJECTION, SOLUTION INTRAMUSCULAR; INTRAVENOUS; SUBCUTANEOUS
Status: DISCONTINUED | OUTPATIENT
Start: 2024-08-24 | End: 2024-08-27

## 2024-08-24 RX ORDER — TRAZODONE HYDROCHLORIDE 50 MG/1
50 TABLET ORAL NIGHTLY
Status: DISCONTINUED | OUTPATIENT
Start: 2024-08-24 | End: 2024-08-30 | Stop reason: HOSPADM

## 2024-08-24 RX ORDER — MIRTAZAPINE 30 MG/1
30 TABLET, FILM COATED ORAL NIGHTLY
Status: DISCONTINUED | OUTPATIENT
Start: 2024-08-24 | End: 2024-08-30 | Stop reason: HOSPADM

## 2024-08-24 RX ORDER — ALLOPURINOL 100 MG/1
300 TABLET ORAL DAILY
Status: DISCONTINUED | OUTPATIENT
Start: 2024-08-25 | End: 2024-08-30 | Stop reason: HOSPADM

## 2024-08-24 RX ORDER — ACETAMINOPHEN 500 MG
1000 TABLET ORAL EVERY 8 HOURS
Status: DISCONTINUED | OUTPATIENT
Start: 2024-08-24 | End: 2024-08-27

## 2024-08-24 RX ORDER — SIMETHICONE 80 MG
1 TABLET,CHEWABLE ORAL 4 TIMES DAILY PRN
Status: DISCONTINUED | OUTPATIENT
Start: 2024-08-24 | End: 2024-08-30 | Stop reason: HOSPADM

## 2024-08-24 RX ORDER — NITROGLYCERIN 0.4 MG/1
0.4 TABLET SUBLINGUAL EVERY 5 MIN PRN
Status: DISCONTINUED | OUTPATIENT
Start: 2024-08-24 | End: 2024-08-30 | Stop reason: HOSPADM

## 2024-08-24 RX ORDER — ALUMINUM HYDROXIDE, MAGNESIUM HYDROXIDE, AND SIMETHICONE 1200; 120; 1200 MG/30ML; MG/30ML; MG/30ML
30 SUSPENSION ORAL 4 TIMES DAILY PRN
Status: DISCONTINUED | OUTPATIENT
Start: 2024-08-24 | End: 2024-08-30 | Stop reason: HOSPADM

## 2024-08-24 RX ORDER — PANTOPRAZOLE SODIUM 20 MG/1
40 TABLET, DELAYED RELEASE ORAL 2 TIMES DAILY
Status: DISCONTINUED | OUTPATIENT
Start: 2024-08-24 | End: 2024-08-30 | Stop reason: HOSPADM

## 2024-08-24 RX ORDER — ONDANSETRON HYDROCHLORIDE 2 MG/ML
4 INJECTION, SOLUTION INTRAVENOUS EVERY 6 HOURS PRN
Status: DISCONTINUED | OUTPATIENT
Start: 2024-08-24 | End: 2024-08-30 | Stop reason: HOSPADM

## 2024-08-24 RX ORDER — TALC
6 POWDER (GRAM) TOPICAL NIGHTLY PRN
Status: DISCONTINUED | OUTPATIENT
Start: 2024-08-24 | End: 2024-08-30 | Stop reason: HOSPADM

## 2024-08-24 RX ORDER — AMOXICILLIN 250 MG
1 CAPSULE ORAL 2 TIMES DAILY
Status: DISCONTINUED | OUTPATIENT
Start: 2024-08-24 | End: 2024-08-25

## 2024-08-24 RX ORDER — LOSARTAN POTASSIUM 50 MG/1
100 TABLET ORAL DAILY
Status: DISCONTINUED | OUTPATIENT
Start: 2024-08-25 | End: 2024-08-30 | Stop reason: HOSPADM

## 2024-08-24 RX ORDER — PROCHLORPERAZINE EDISYLATE 5 MG/ML
5 INJECTION INTRAMUSCULAR; INTRAVENOUS EVERY 6 HOURS PRN
Status: DISCONTINUED | OUTPATIENT
Start: 2024-08-24 | End: 2024-08-30 | Stop reason: HOSPADM

## 2024-08-24 RX ORDER — IBUPROFEN 200 MG
24 TABLET ORAL
Status: DISCONTINUED | OUTPATIENT
Start: 2024-08-24 | End: 2024-08-30 | Stop reason: HOSPADM

## 2024-08-24 RX ORDER — SODIUM CHLORIDE 0.9 % (FLUSH) 0.9 %
10 SYRINGE (ML) INJECTION EVERY 12 HOURS PRN
Status: DISCONTINUED | OUTPATIENT
Start: 2024-08-24 | End: 2024-08-30 | Stop reason: HOSPADM

## 2024-08-24 RX ORDER — ATORVASTATIN CALCIUM 40 MG/1
80 TABLET, FILM COATED ORAL DAILY
Status: DISCONTINUED | OUTPATIENT
Start: 2024-08-25 | End: 2024-08-30 | Stop reason: HOSPADM

## 2024-08-24 RX ORDER — TAMSULOSIN HYDROCHLORIDE 0.4 MG/1
0.8 CAPSULE ORAL NIGHTLY
Status: DISCONTINUED | OUTPATIENT
Start: 2024-08-24 | End: 2024-08-30 | Stop reason: HOSPADM

## 2024-08-24 RX ORDER — ASPIRIN 81 MG/1
162 TABLET ORAL DAILY
Status: DISCONTINUED | OUTPATIENT
Start: 2024-08-25 | End: 2024-08-25

## 2024-08-24 RX ORDER — IBUPROFEN 200 MG
16 TABLET ORAL
Status: DISCONTINUED | OUTPATIENT
Start: 2024-08-24 | End: 2024-08-30 | Stop reason: HOSPADM

## 2024-08-24 RX ORDER — OXYCODONE HYDROCHLORIDE 10 MG/1
10 TABLET ORAL EVERY 4 HOURS PRN
Status: DISCONTINUED | OUTPATIENT
Start: 2024-08-24 | End: 2024-08-30 | Stop reason: HOSPADM

## 2024-08-24 RX ORDER — NALOXONE HCL 0.4 MG/ML
0.02 VIAL (ML) INJECTION
Status: DISCONTINUED | OUTPATIENT
Start: 2024-08-24 | End: 2024-08-30 | Stop reason: HOSPADM

## 2024-08-24 RX ORDER — AMLODIPINE BESYLATE 10 MG/1
10 TABLET ORAL DAILY
Status: DISCONTINUED | OUTPATIENT
Start: 2024-08-25 | End: 2024-08-30 | Stop reason: HOSPADM

## 2024-08-24 RX ORDER — HYDROMORPHONE HYDROCHLORIDE 1 MG/ML
1 INJECTION, SOLUTION INTRAMUSCULAR; INTRAVENOUS; SUBCUTANEOUS ONCE
Status: COMPLETED | OUTPATIENT
Start: 2024-08-24 | End: 2024-08-24

## 2024-08-24 RX ORDER — IPRATROPIUM BROMIDE AND ALBUTEROL SULFATE 2.5; .5 MG/3ML; MG/3ML
3 SOLUTION RESPIRATORY (INHALATION) EVERY 6 HOURS PRN
Status: DISCONTINUED | OUTPATIENT
Start: 2024-08-24 | End: 2024-08-30 | Stop reason: HOSPADM

## 2024-08-24 RX ORDER — METOPROLOL SUCCINATE 25 MG/1
25 TABLET, EXTENDED RELEASE ORAL NIGHTLY
Status: DISCONTINUED | OUTPATIENT
Start: 2024-08-24 | End: 2024-08-30 | Stop reason: HOSPADM

## 2024-08-24 RX ORDER — GLUCAGON 1 MG
1 KIT INJECTION
Status: DISCONTINUED | OUTPATIENT
Start: 2024-08-24 | End: 2024-08-30 | Stop reason: HOSPADM

## 2024-08-24 RX ADMIN — HYDROMORPHONE HYDROCHLORIDE 1 MG: 1 INJECTION, SOLUTION INTRAMUSCULAR; INTRAVENOUS; SUBCUTANEOUS at 03:08

## 2024-08-24 RX ADMIN — SENNOSIDES AND DOCUSATE SODIUM 1 TABLET: 50; 8.6 TABLET ORAL at 09:08

## 2024-08-24 RX ADMIN — HYDROMORPHONE HYDROCHLORIDE 1 MG: 1 INJECTION, SOLUTION INTRAMUSCULAR; INTRAVENOUS; SUBCUTANEOUS at 10:08

## 2024-08-24 RX ADMIN — PIPERACILLIN SODIUM AND TAZOBACTAM SODIUM 4.5 G: 4; .5 INJECTION, POWDER, FOR SOLUTION INTRAVENOUS at 09:08

## 2024-08-24 RX ADMIN — OXYCODONE HYDROCHLORIDE 10 MG: 10 TABLET ORAL at 06:08

## 2024-08-24 RX ADMIN — ACETAMINOPHEN 1000 MG: 325 TABLET ORAL at 09:08

## 2024-08-24 RX ADMIN — HYDROMORPHONE HYDROCHLORIDE 1 MG: 1 INJECTION, SOLUTION INTRAMUSCULAR; INTRAVENOUS; SUBCUTANEOUS at 06:08

## 2024-08-24 RX ADMIN — METOPROLOL SUCCINATE 25 MG: 25 TABLET, EXTENDED RELEASE ORAL at 09:08

## 2024-08-24 RX ADMIN — PANTOPRAZOLE SODIUM 40 MG: 20 TABLET, DELAYED RELEASE ORAL at 09:08

## 2024-08-24 RX ADMIN — MIRTAZAPINE 30 MG: 7.5 TABLET, FILM COATED ORAL at 09:08

## 2024-08-24 RX ADMIN — Medication 6 MG: at 09:08

## 2024-08-24 RX ADMIN — TAMSULOSIN HYDROCHLORIDE 0.8 MG: 0.4 CAPSULE ORAL at 09:08

## 2024-08-24 RX ADMIN — TRAZODONE HYDROCHLORIDE 50 MG: 50 TABLET ORAL at 09:08

## 2024-08-24 RX ADMIN — OXYCODONE HYDROCHLORIDE 15 MG: 5 TABLET ORAL at 09:08

## 2024-08-24 NOTE — NURSING
Nurses Note -- 4 Eyes      8/24/2024   3:17 PM      Skin assessed during: Admit      [x] No Altered Skin Integrity Present    []Prevention Measures Documented      [] Yes- Altered Skin Integrity Present or Discovered   [] LDA Added if Not in Epic (Describe Wound)   [] New Altered Skin Integrity was Present on Admit and Documented in LDA   [] Wound Image Taken    Wound Care Consulted? No    Attending Nurse:  Starr Trent RN/Staff Member:  ale

## 2024-08-24 NOTE — ASSESSMENT & PLAN NOTE
S/p open noe 8/19, complicated by necrosis and adherence to bowel wall with 2100cc blood loss  HIDA 8/23 concerning for bile leak/CBD obstruction   ERCP 8/23, unable to cannulate the bile duct  Transferred to [facility] main [place] for AES evaluation  Continue Zosyn   AES consulted  Pain control   NPO at midnight  Continue to hold plavix

## 2024-08-24 NOTE — HPI
63M with PMH of CAD s/p CABG, chronic back pain, gout, HTN, HLD, endovascular aortic aneurysm repair (July 2024) admitted to [facility] on 8/16 for acute cholecystitis which was confirmed with imaging. General surgery performed cholecystectomy with MONIQUE drain placement on 8/19, laparoscopic cholecystectomy was converted to an open cholecystectomy as procedure was complicated by gallbladder necrosis and adherence to bowels with approximately 2100 mL estimated blood loss. He was monitored in the ICU post-operatively, but has subsequently stepped down to a floor bed. Abdominal pain persisted postoperatively requiring PCA. General Surgery noted MONIQUE drain had bilious drainage on 8/21. Gastroenterology evaluated patient for concern of bile duct leak. HIDA scan on 8/23 had concern for bile leak into the peritoneal cavity/MONIQUE drain. He underwent ERCP on 8/23 but unable to cannulate the bile duct. Referring team spoke with AES at [facility] and patient subsequently transferred for AES evaluation. Patient currently HDS and pain well controlled off PCA. Labwork today: Sodium 139, potassium 3.7, chloride 103, CO2 28, BUN 7, creatinine 0.65, glucose 96, total bilirubin 1, AST 24, ALT 15, white blood cells 6.43, hemoglobin 10.4, hematocrit 31.7, platelets 331. He will be admitted to hospital medicine service for further management.

## 2024-08-24 NOTE — ASSESSMENT & PLAN NOTE
Chronic, controlled. Latest blood pressure and vitals reviewed-     Temp:  [97.4 °F (36.3 °C)-98.1 °F (36.7 °C)]   Pulse:  []   Resp:  [10-18]   BP: (109-153)/(71-88)   SpO2:  [92 %-99 %] .   Home meds for hypertension were reviewed and noted below.   Hypertension Medications               amLODIPine (NORVASC) 10 MG tablet Take 10 mg by mouth once daily.    losartan (COZAAR) 100 MG tablet Take 100 mg by mouth once daily.    metoprolol succinate (TOPROL-XL) 25 MG 24 hr tablet Take 25 mg by mouth every evening.    nitroGLYCERIN (NITROSTAT) 0.4 MG SL tablet Place 0.4 mg under the tongue every 5 (five) minutes as needed for Chest pain.            While in the hospital, will manage blood pressure as follows; Continue home antihypertensive regimen    Will utilize p.r.n. blood pressure medication only if patient's blood pressure greater than 180/110 and he develops symptoms such as worsening chest pain or shortness of breath.

## 2024-08-24 NOTE — ASSESSMENT & PLAN NOTE
Known AAA atherosclerotic arterial disease  8/16 CTA aorto bi-iliac stent appears in satisfactory position with no stenosis or extravasation of the stent, no stenosis  Continue beta blocker and BP control

## 2024-08-24 NOTE — SUBJECTIVE & OBJECTIVE
Past Medical History:   Diagnosis Date    Arthritis     Coronary artery disease     stents x2 in heart    Gout     Hyperlipidemia     Hypertension        Past Surgical History:   Procedure Laterality Date    ABDOMINAL AORTIC ANEURYSM REPAIR, ENDOVASCULAR Bilateral 7/9/2024    Procedure: REPAIR-ANEURYSM-ABDOMINAL AORTIC-ENDOVASCULAR (AAA);  Surgeon: Jose Hernandez MD;  Location: [facility];  Service: Vascular;  Laterality: Bilateral;    CORONARY ANGIOPLASTY WITH STENT PLACEMENT      X2    EPIDURAL STEROID INJECTION INTO CERVICAL SPINE N/A 7/10/2018    Procedure: Injection-steroid-epidural-cervical;  Surgeon: Kim Fuchs Jr., MD;  Location: [facility] OR;  Service: Pain Management;  Laterality: N/A;  to left    ESOPHAGOGASTRODUODENOSCOPY N/A 3/14/2023    Procedure: EGD (ESOPHAGOGASTRODUODENOSCOPY);  Surgeon: Brendon Figueroa MD;  Location: [facility];  Service: Gastroenterology;  Laterality: N/A;    index finger amputation Left     LAPAROSCOPIC CHOLECYSTECTOMY N/A 8/19/2024    Procedure: CHOLECYSTECTOMY, LAPAROSCOPIC- converted to open;  Surgeon: Cristopher Daniel MD;  Location: [facility] OR;  Service: General;  Laterality: N/A;    MANDIBLE SURGERY      wiring    RADIOFREQUENCY ABLATION OF LUMBAR MEDIAL BRANCH NERVE AT SINGLE LEVEL Bilateral 5/22/2018    Procedure: RADIOFREQUENCY THERMOCOAGULATION (RFTC)-NERVE-MEDIAN BRANCH-LUMBAR L3, L4, L5;  Surgeon: Bucky Paz MD;  Location[facility];  Service: Pain Management;  Laterality: Bilateral;    SHOULDER ARTHROSCOPY Right        Review of patient's allergies indicates:  No Known Allergies    Current Facility-Administered Medications on File Prior to Encounter   Medication    [COMPLETED] chlorhexidine 0.12 % solution 15 mL    [COMPLETED] mupirocin 2 % ointment    [DISCONTINUED] acetaminophen tablet 650 mg    [DISCONTINUED] allopurinoL tablet 300 mg    [DISCONTINUED] amLODIPine tablet 10 mg    [DISCONTINUED] atorvastatin tablet 80 mg    [DISCONTINUED] dexAMETHasone  injection    [DISCONTINUED] dextrose 50% injection 12.5 g    [DISCONTINUED] ePHEDrine sulfate    [DISCONTINUED] fentaNYL 50 mcg/mL injection    [DISCONTINUED] gabapentin capsule 800 mg    [DISCONTINUED] glucagon (human recombinant) injection 1 mg    [DISCONTINUED] HYDROmorphone injection 0.5 mg    [DISCONTINUED] HYDROmorphone PCA syringe 6 mg/30 mL (0.2 mg/mL) NS    [DISCONTINUED] indomethacin Supp    [DISCONTINUED] ketamine in 0.9 % sod chloride 50 mg/5 mL (10 mg/mL) injection    [DISCONTINUED] lactated ringers infusion    [DISCONTINUED] LIDOcaine (PF) 20 mg/mL (2%) injection    [DISCONTINUED] LIDOcaine 5 % patch 1 patch    [DISCONTINUED] losartan tablet 50 mg    [DISCONTINUED] meperidine (PF) injection 12.5 mg    [DISCONTINUED] metoprolol succinate (TOPROL-XL) 24 hr tablet 25 mg    [DISCONTINUED] mirtazapine tablet 30 mg    [DISCONTINUED] morphine 12 hr tablet 15 mg    [DISCONTINUED] naloxone 0.4 mg/mL injection 0.02 mg    [DISCONTINUED] nicotine 21 mg/24 hr 1 patch    [DISCONTINUED] ondansetron injection 4 mg    [DISCONTINUED] ondansetron injection 4 mg    [DISCONTINUED] ondansetron injection    [DISCONTINUED] oxyCODONE-acetaminophen  mg per tablet 1 tablet    [DISCONTINUED] pantoprazole EC tablet 40 mg    [DISCONTINUED] PHENYLephrine HCl in 0.9% NaCl 1 mg/10 mL (100 mcg/mL)    [DISCONTINUED] piperacillin-tazobactam (ZOSYN) 4.5 g in D5W 100 mL IVPB (MB+)    [DISCONTINUED] polyethylene glycol packet 17 g    [DISCONTINUED] polyethylene glycol packet 17 g    [DISCONTINUED] prochlorperazine injection Soln 10 mg    [DISCONTINUED] prochlorperazine injection Soln 5 mg    [DISCONTINUED] propofol (DIPRIVAN) 10 mg/mL infusion    [DISCONTINUED] rocuronium injection    [DISCONTINUED] sodium chloride 0.9% flush 10 mL    [DISCONTINUED] sugammadex (BRIDION) 100 mg/mL injection    [DISCONTINUED] tamsulosin 24 hr capsule 0.8 mg    [DISCONTINUED] traZODone tablet 50 mg     Current Outpatient Medications on File Prior to  Encounter   Medication Sig    allopurinol (ZYLOPRIM) 300 MG tablet Take 300 mg by mouth once daily.    amLODIPine (NORVASC) 10 MG tablet Take 10 mg by mouth once daily.    aspirin 325 MG tablet Take 162.5 mg by mouth once daily.    clopidogrel (PLAVIX) 75 mg tablet Take 75 mg by mouth once daily.    D5W PgBk 100 mL with piperacillin-tazobactam 4.5 gram SolR 4.5 g Inject 4.5 g into the vein every 8 (eight) hours.    losartan (COZAAR) 100 MG tablet Take 100 mg by mouth once daily.    metoprolol succinate (TOPROL-XL) 25 MG 24 hr tablet Take 25 mg by mouth every evening.    mirtazapine (REMERON) 30 MG tablet Take 30 mg by mouth every evening.    nitroGLYCERIN (NITROSTAT) 0.4 MG SL tablet Place 0.4 mg under the tongue every 5 (five) minutes as needed for Chest pain.    oxyCODONE-acetaminophen (PERCOCET)  mg per tablet Take 1 tablet by mouth 4 (four) times daily as needed for Pain.    pantoprazole (PROTONIX) 40 MG tablet Take 40 mg by mouth 2 (two) times daily.    REPATHA SURECLICK 140 mg/mL PnIj Inject 140 mg into the skin every 14 (fourteen) days.    rosuvastatin (CRESTOR) 40 MG Tab Take 40 mg by mouth once daily.    tamsulosin (FLOMAX) 0.4 mg Cap Take 0.8 mg by mouth every evening.    traZODone (DESYREL) 50 MG tablet Take  mg by mouth every evening.     Family History    None       Tobacco Use    Smoking status: Every Day     Current packs/day: 2.00     Average packs/day: 2.0 packs/day for 30.0 years (60.0 ttl pk-yrs)     Types: Cigarettes    Smokeless tobacco: Never   Substance and Sexual Activity    Alcohol use: Yes     Alcohol/week: 1.0 standard drink of alcohol     Types: 1 Cans of beer per week    Drug use: No    Sexual activity: Not on file     Review of Systems   Constitutional:  Positive for activity change. Negative for appetite change, chills, diaphoresis, fatigue, fever and unexpected weight change.   Respiratory:  Negative for cough, chest tightness, shortness of breath, wheezing and stridor.     Cardiovascular:  Negative for chest pain, palpitations and leg swelling.   Gastrointestinal:  Positive for abdominal distention and abdominal pain. Negative for blood in stool, constipation, diarrhea, nausea and vomiting.   Endocrine: Negative for cold intolerance and heat intolerance.   Genitourinary:  Negative for difficulty urinating, dysuria, flank pain and frequency.   Musculoskeletal:  Positive for back pain and gait problem. Negative for arthralgias, joint swelling and myalgias.   Skin: Negative.    Neurological:  Negative for dizziness, weakness, light-headedness and headaches.     Objective:     Vital Signs (Most Recent):  Temp: 97.8 °F (36.6 °C) (08/24/24 1521)  Pulse: 62 (08/24/24 1522)  Resp: 16 (08/24/24 1521)  BP: 118/71 (08/24/24 1521)  SpO2: 98 % (08/24/24 1521) Vital Signs (24h Range):  Temp:  [97.4 °F (36.3 °C)-98.1 °F (36.7 °C)] 97.8 °F (36.6 °C)  Pulse:  [] 62  Resp:  [10-18] 16  SpO2:  [92 %-99 %] 98 %  BP: (109-153)/(71-88) 118/71     Weight: 79.7 kg (175 lb 11.3 oz)  Body mass index is 26.72 kg/m².     Physical Exam  Constitutional:       General: He is not in acute distress.     Appearance: He is ill-appearing. He is not toxic-appearing.   HENT:      Head: Normocephalic and atraumatic.   Eyes:      Conjunctiva/sclera: Conjunctivae normal.      Pupils: Pupils are equal, round, and reactive to light.   Cardiovascular:      Rate and Rhythm: Normal rate and regular rhythm.      Heart sounds: Normal heart sounds.   Pulmonary:      Effort: Pulmonary effort is normal. No respiratory distress.      Breath sounds: Normal breath sounds. No wheezing or rales.   Abdominal:      General: Bowel sounds are normal. There is distension.      Palpations: Abdomen is soft.      Tenderness: There is abdominal tenderness.      Comments: MONIQUE drain with bilious output. Abdominal incisions with dressing in place CDI   Musculoskeletal:         General: Normal range of motion.      Cervical back: Normal range of  motion and neck supple.      Right lower leg: No edema.      Left lower leg: No edema.   Skin:     General: Skin is warm and dry.   Neurological:      General: No focal deficit present.      Mental Status: He is alert and oriented to person, place, and time. Mental status is at baseline.   Psychiatric:         Mood and Affect: Mood normal.         Behavior: Behavior normal.              CRANIAL NERVES     CN III, IV, VI   Pupils are equal, round, and reactive to light.       Significant Labs: All pertinent labs within the past 24 hours have been reviewed.    Significant Imaging: I have reviewed all pertinent imaging results/findings within the past 24 hours.

## 2024-08-25 ENCOUNTER — ANESTHESIA EVENT (OUTPATIENT)
Dept: ENDOSCOPY | Facility: HOSPITAL | Age: 64
End: 2024-08-25
Payer: MEDICARE

## 2024-08-25 PROBLEM — R19.7 DIARRHEA: Status: ACTIVE | Noted: 2024-08-25

## 2024-08-25 PROBLEM — Z95.1 HISTORY OF CORONARY ARTERY BYPASS GRAFT: Status: ACTIVE | Noted: 2024-08-25

## 2024-08-25 PROBLEM — F17.200 NICOTINE DEPENDENCE: Status: ACTIVE | Noted: 2024-08-25

## 2024-08-25 PROBLEM — R52 PAIN: Status: ACTIVE | Noted: 2024-08-25

## 2024-08-25 LAB
ALBUMIN SERPL BCP-MCNC: 2.7 G/DL (ref 3.5–5.2)
ALP SERPL-CCNC: 102 U/L (ref 55–135)
ALT SERPL W/O P-5'-P-CCNC: 12 U/L (ref 10–44)
ANION GAP SERPL CALC-SCNC: 11 MMOL/L (ref 8–16)
AST SERPL-CCNC: 17 U/L (ref 10–40)
BASOPHILS # BLD AUTO: 0.03 K/UL (ref 0–0.2)
BASOPHILS NFR BLD: 0.3 % (ref 0–1.9)
BILIRUB SERPL-MCNC: 0.8 MG/DL (ref 0.1–1)
BUN SERPL-MCNC: 7 MG/DL (ref 8–23)
CALCIUM SERPL-MCNC: 9.1 MG/DL (ref 8.7–10.5)
CHLORIDE SERPL-SCNC: 106 MMOL/L (ref 95–110)
CO2 SERPL-SCNC: 24 MMOL/L (ref 23–29)
CREAT SERPL-MCNC: 0.8 MG/DL (ref 0.5–1.4)
DIFFERENTIAL METHOD BLD: ABNORMAL
EOSINOPHIL # BLD AUTO: 0.2 K/UL (ref 0–0.5)
EOSINOPHIL NFR BLD: 2.2 % (ref 0–8)
ERYTHROCYTE [DISTWIDTH] IN BLOOD BY AUTOMATED COUNT: 15 % (ref 11.5–14.5)
EST. GFR  (NO RACE VARIABLE): >60 ML/MIN/1.73 M^2
GLUCOSE SERPL-MCNC: 102 MG/DL (ref 70–110)
HCT VFR BLD AUTO: 36.4 % (ref 40–54)
HGB BLD-MCNC: 11.7 G/DL (ref 14–18)
IMM GRANULOCYTES # BLD AUTO: 0.03 K/UL (ref 0–0.04)
IMM GRANULOCYTES NFR BLD AUTO: 0.3 % (ref 0–0.5)
LYMPHOCYTES # BLD AUTO: 1.7 K/UL (ref 1–4.8)
LYMPHOCYTES NFR BLD: 19.2 % (ref 18–48)
MAGNESIUM SERPL-MCNC: 1.9 MG/DL (ref 1.6–2.6)
MCH RBC QN AUTO: 26.7 PG (ref 27–31)
MCHC RBC AUTO-ENTMCNC: 32.1 G/DL (ref 32–36)
MCV RBC AUTO: 83 FL (ref 82–98)
MONOCYTES # BLD AUTO: 0.8 K/UL (ref 0.3–1)
MONOCYTES NFR BLD: 9.4 % (ref 4–15)
NEUTROPHILS # BLD AUTO: 6 K/UL (ref 1.8–7.7)
NEUTROPHILS NFR BLD: 68.6 % (ref 38–73)
NRBC BLD-RTO: 0 /100 WBC
PHOSPHATE SERPL-MCNC: 3.7 MG/DL (ref 2.7–4.5)
PLATELET # BLD AUTO: 391 K/UL (ref 150–450)
PMV BLD AUTO: 11 FL (ref 9.2–12.9)
POTASSIUM SERPL-SCNC: 3.5 MMOL/L (ref 3.5–5.1)
PROT SERPL-MCNC: 6.8 G/DL (ref 6–8.4)
RBC # BLD AUTO: 4.39 M/UL (ref 4.6–6.2)
SODIUM SERPL-SCNC: 141 MMOL/L (ref 136–145)
WBC # BLD AUTO: 8.77 K/UL (ref 3.9–12.7)

## 2024-08-25 PROCEDURE — 83735 ASSAY OF MAGNESIUM: CPT | Performed by: INTERNAL MEDICINE

## 2024-08-25 PROCEDURE — 11000001 HC ACUTE MED/SURG PRIVATE ROOM

## 2024-08-25 PROCEDURE — 36415 COLL VENOUS BLD VENIPUNCTURE: CPT | Performed by: INTERNAL MEDICINE

## 2024-08-25 PROCEDURE — 84100 ASSAY OF PHOSPHORUS: CPT | Performed by: INTERNAL MEDICINE

## 2024-08-25 PROCEDURE — 80053 COMPREHEN METABOLIC PANEL: CPT | Performed by: INTERNAL MEDICINE

## 2024-08-25 PROCEDURE — 25000003 PHARM REV CODE 250: Performed by: INTERNAL MEDICINE

## 2024-08-25 PROCEDURE — 85025 COMPLETE CBC W/AUTO DIFF WBC: CPT | Performed by: INTERNAL MEDICINE

## 2024-08-25 PROCEDURE — 99223 1ST HOSP IP/OBS HIGH 75: CPT | Mod: 25,GC,, | Performed by: INTERNAL MEDICINE

## 2024-08-25 PROCEDURE — 63600175 PHARM REV CODE 636 W HCPCS: Performed by: INTERNAL MEDICINE

## 2024-08-25 RX ORDER — SODIUM CHLORIDE, SODIUM LACTATE, POTASSIUM CHLORIDE, CALCIUM CHLORIDE 600; 310; 30; 20 MG/100ML; MG/100ML; MG/100ML; MG/100ML
INJECTION, SOLUTION INTRAVENOUS CONTINUOUS
Status: DISCONTINUED | OUTPATIENT
Start: 2024-08-25 | End: 2024-08-25

## 2024-08-25 RX ORDER — TIZANIDINE 4 MG/1
4 TABLET ORAL EVERY 8 HOURS PRN
Status: DISCONTINUED | OUTPATIENT
Start: 2024-08-25 | End: 2024-08-30 | Stop reason: HOSPADM

## 2024-08-25 RX ORDER — GABAPENTIN 400 MG/1
800 CAPSULE ORAL NIGHTLY
Status: DISCONTINUED | OUTPATIENT
Start: 2024-08-25 | End: 2024-08-30 | Stop reason: HOSPADM

## 2024-08-25 RX ORDER — SODIUM CHLORIDE, SODIUM LACTATE, POTASSIUM CHLORIDE, CALCIUM CHLORIDE 600; 310; 30; 20 MG/100ML; MG/100ML; MG/100ML; MG/100ML
INJECTION, SOLUTION INTRAVENOUS CONTINUOUS
Status: ACTIVE | OUTPATIENT
Start: 2024-08-25 | End: 2024-08-25

## 2024-08-25 RX ADMIN — OXYCODONE HYDROCHLORIDE 15 MG: 5 TABLET ORAL at 12:08

## 2024-08-25 RX ADMIN — TAMSULOSIN HYDROCHLORIDE 0.8 MG: 0.4 CAPSULE ORAL at 09:08

## 2024-08-25 RX ADMIN — TIZANIDINE 4 MG: 4 TABLET ORAL at 09:08

## 2024-08-25 RX ADMIN — SENNOSIDES AND DOCUSATE SODIUM 1 TABLET: 50; 8.6 TABLET ORAL at 07:08

## 2024-08-25 RX ADMIN — PANTOPRAZOLE SODIUM 40 MG: 20 TABLET, DELAYED RELEASE ORAL at 09:08

## 2024-08-25 RX ADMIN — OXYCODONE HYDROCHLORIDE 10 MG: 10 TABLET ORAL at 02:08

## 2024-08-25 RX ADMIN — PIPERACILLIN SODIUM AND TAZOBACTAM SODIUM 4.5 G: 4; .5 INJECTION, POWDER, FOR SOLUTION INTRAVENOUS at 09:08

## 2024-08-25 RX ADMIN — HYDROMORPHONE HYDROCHLORIDE 1 MG: 1 INJECTION, SOLUTION INTRAMUSCULAR; INTRAVENOUS; SUBCUTANEOUS at 05:08

## 2024-08-25 RX ADMIN — ATORVASTATIN CALCIUM 80 MG: 40 TABLET, FILM COATED ORAL at 09:08

## 2024-08-25 RX ADMIN — OXYCODONE HYDROCHLORIDE 15 MG: 5 TABLET ORAL at 04:08

## 2024-08-25 RX ADMIN — ALLOPURINOL 300 MG: 100 TABLET ORAL at 09:08

## 2024-08-25 RX ADMIN — PIPERACILLIN SODIUM AND TAZOBACTAM SODIUM 4.5 G: 4; .5 INJECTION, POWDER, FOR SOLUTION INTRAVENOUS at 06:08

## 2024-08-25 RX ADMIN — MIRTAZAPINE 30 MG: 7.5 TABLET, FILM COATED ORAL at 09:08

## 2024-08-25 RX ADMIN — OXYCODONE HYDROCHLORIDE 15 MG: 5 TABLET ORAL at 03:08

## 2024-08-25 RX ADMIN — HYDROMORPHONE HYDROCHLORIDE 1 MG: 1 INJECTION, SOLUTION INTRAMUSCULAR; INTRAVENOUS; SUBCUTANEOUS at 04:08

## 2024-08-25 RX ADMIN — GABAPENTIN 800 MG: 400 CAPSULE ORAL at 09:08

## 2024-08-25 RX ADMIN — METOPROLOL SUCCINATE 25 MG: 25 TABLET, EXTENDED RELEASE ORAL at 09:08

## 2024-08-25 RX ADMIN — OXYCODONE HYDROCHLORIDE 15 MG: 5 TABLET ORAL at 07:08

## 2024-08-25 RX ADMIN — ACETAMINOPHEN 1000 MG: 325 TABLET ORAL at 12:08

## 2024-08-25 RX ADMIN — OXYCODONE HYDROCHLORIDE 10 MG: 10 TABLET ORAL at 06:08

## 2024-08-25 RX ADMIN — SODIUM CHLORIDE, POTASSIUM CHLORIDE, SODIUM LACTATE AND CALCIUM CHLORIDE: 600; 310; 30; 20 INJECTION, SOLUTION INTRAVENOUS at 07:08

## 2024-08-25 RX ADMIN — HYDROMORPHONE HYDROCHLORIDE 1 MG: 1 INJECTION, SOLUTION INTRAMUSCULAR; INTRAVENOUS; SUBCUTANEOUS at 07:08

## 2024-08-25 RX ADMIN — AMLODIPINE BESYLATE 10 MG: 10 TABLET ORAL at 07:08

## 2024-08-25 RX ADMIN — HYDROMORPHONE HYDROCHLORIDE 1 MG: 1 INJECTION, SOLUTION INTRAMUSCULAR; INTRAVENOUS; SUBCUTANEOUS at 01:08

## 2024-08-25 RX ADMIN — TIZANIDINE 4 MG: 4 TABLET ORAL at 11:08

## 2024-08-25 RX ADMIN — LOSARTAN POTASSIUM 100 MG: 50 TABLET, FILM COATED ORAL at 09:08

## 2024-08-25 RX ADMIN — ACETAMINOPHEN 1000 MG: 325 TABLET ORAL at 09:08

## 2024-08-25 RX ADMIN — Medication 6 MG: at 09:08

## 2024-08-25 RX ADMIN — HYDROMORPHONE HYDROCHLORIDE 1 MG: 1 INJECTION, SOLUTION INTRAMUSCULAR; INTRAVENOUS; SUBCUTANEOUS at 11:08

## 2024-08-25 RX ADMIN — OXYCODONE HYDROCHLORIDE 15 MG: 5 TABLET ORAL at 09:08

## 2024-08-25 RX ADMIN — ACETAMINOPHEN 1000 MG: 325 TABLET ORAL at 06:08

## 2024-08-25 RX ADMIN — PANTOPRAZOLE SODIUM 40 MG: 20 TABLET, DELAYED RELEASE ORAL at 07:08

## 2024-08-25 RX ADMIN — TRAZODONE HYDROCHLORIDE 50 MG: 50 TABLET ORAL at 09:08

## 2024-08-25 RX ADMIN — SODIUM CHLORIDE, POTASSIUM CHLORIDE, SODIUM LACTATE AND CALCIUM CHLORIDE: 600; 310; 30; 20 INJECTION, SOLUTION INTRAVENOUS at 11:08

## 2024-08-25 RX ADMIN — ONDANSETRON 4 MG: 2 INJECTION INTRAMUSCULAR; INTRAVENOUS at 09:08

## 2024-08-25 NOTE — PLAN OF CARE
Problem: Adult Inpatient Plan of Care  Goal: Readiness for Transition of Care  Outcome: Progressing     Problem: Adult Inpatient Plan of Care  Goal: Optimal Comfort and Wellbeing  Outcome: Progressing

## 2024-08-25 NOTE — ASSESSMENT & PLAN NOTE
S/p open noe 8/19, complicated by necrosis and adherence to bowel wall with 2100cc blood loss  HIDA 8/23 concerning for bile leak/CBD obstruction   ERCP 8/23, unable to cannulate the bile duct  Transferred to [facility] main [place] for AES evaluation  Continue Zosyn   AES consulted  Pain control   Continue to hold asa/plavix   ERCP planned for tomorrow. NPO at midnight.

## 2024-08-25 NOTE — CONSULTS
[facility] Consult Note    Attending: Ahmet Mahoney MD   Admit Date: 8/24/2024  Today's Date: 08/25/2024      SUBJECTIVE:     HPI:  Adam Persaud is a 63 year old male for whom AES is consulted for management of bile leak. He has a history of CAD, CABG, AAA repair with aortic bi-iliac stent on ASA and Plavix, chronic back pain, gout, HTN.     He initially presented to [facility] on 8/16 with RUQ and epigastric abdominal pain. Labs on presentation grossly unremarkable.  CTA chest, abdomen and pelvis showed patent aortic bi-iliac stent pericholecystic fat stranding and gallbladder wall thickening concerning for acute cholecystitis. US abdomen showed acute cholecystitis with a positive Davis's sign. On 8/19, he underwent a lap noe which was converted to open as the gallbladder was adhered to the transverse colon with placement of a 19-fr jorge drain. On 8/19 and and 8/20 he did have a leukocytosis, started on Zosyn. No blood cx obtained. Following surgery, he continued to experience RUQ abdominal pain with bilious fluid in the drain. HIDA scan done on 8/23 which was concerning for bile leak. GI consulted; s/p ERCP on 8/23 but unable to cannulate the bile duct. For persistent pain, he was placed on a PCA pump and ultimately transferred to [facility] for AES evaluation. On arrival, he was afebrile and HDS. Laboratory workup notable for stable microcytic anemia, Hgb 10.4 otherwise normal. He was given stool softeners and is now having diarrhea. Plavix has been held since admission at outside facility. Per bedside RN, she emptied his drain this morning, which was only about assisted full.     Most Recent Endoscopic Procedures:   ERCP 8/23/2024: The major papilla was bulging. The bile duct could not be cannulated with the short-nosed traction sphincterotome.     Review of patient's allergies indicates:  No Known Allergies    Past Medical History:   Diagnosis Date    Arthritis     Coronary  artery disease     stents x2 in heart    Gout     Hyperlipidemia     Hypertension      Past Surgical History:   Procedure Laterality Date    ABDOMINAL AORTIC ANEURYSM REPAIR, ENDOVASCULAR Bilateral 7/9/2024    Procedure: REPAIR-ANEURYSM-ABDOMINAL AORTIC-ENDOVASCULAR (AAA);  Surgeon: Jose Hernandez MD;  Location: [facility];  Service: Vascular;  Laterality: Bilateral;    CORONARY ANGIOPLASTY WITH STENT PLACEMENT      X2    EPIDURAL STEROID INJECTION INTO CERVICAL SPINE N/A 7/10/2018    Procedure: Injection-steroid-epidural-cervical;  Surgeon: Kim Fuchs Jr., MD;  Location: [facility] OR;  Service: Pain Management;  Laterality: N/A;  to left    ESOPHAGOGASTRODUODENOSCOPY N/A 3/14/2023    Procedure: EGD (ESOPHAGOGASTRODUODENOSCOPY);  Surgeon: Brendon Figueroa MD;  Location: [facility];  Service: Gastroenterology;  Laterality: N/A;    index finger amputation Left     LAPAROSCOPIC CHOLECYSTECTOMY N/A 8/19/2024    Procedure: CHOLECYSTECTOMY, LAPAROSCOPIC- converted to open;  Surgeon: Cristopher Daniel MD;  Location: [facility] OR;  Service: General;  Laterality: N/A;    MANDIBLE SURGERY      wiring    RADIOFREQUENCY ABLATION OF LUMBAR MEDIAL BRANCH NERVE AT SINGLE LEVEL Bilateral 5/22/2018    Procedure: RADIOFREQUENCY THERMOCOAGULATION (RFTC)-NERVE-MEDIAN BRANCH-LUMBAR L3, L4, L5;  Surgeon: Bucky Paz MD;  Location: [facility] OR;  Service: Pain Management;  Laterality: Bilateral;    SHOULDER ARTHROSCOPY Right      No family history on file.  Social History     Tobacco Use    Smoking status: Every Day     Current packs/day: 2.00     Average packs/day: 2.0 packs/day for 30.0 years (60.0 ttl pk-yrs)     Types: Cigarettes    Smokeless tobacco: Never   Substance Use Topics    Alcohol use: Yes     Alcohol/week: 1.0 standard drink of alcohol     Types: 1 Cans of beer per week    Drug use: No       All home medications reviewed.    Review of Systems   Gastrointestinal:  Positive for abdominal pain and diarrhea. Negative for nausea  and vomiting.       OBJECTIVE:     Vital Signs Trends/History Reviewed  Vitals:    08/25/24 0635 08/25/24 0710 08/25/24 0711 08/25/24 0753   BP:  128/65     BP Location:  Left arm     Patient Position:  Lying     Pulse:  70     Resp:  18 18 18   Temp: 98.3 °F (36.8 °C) 97.6 °F (36.4 °C)     TempSrc:  Oral     SpO2:  97%     Weight:       Height:             Physical Exam  Vitals reviewed.   Constitutional:       General: He is not in acute distress.     Appearance: Normal appearance. He is not ill-appearing.   Eyes:      General: No scleral icterus.  Cardiovascular:      Rate and Rhythm: Normal rate.   Pulmonary:      Effort: Pulmonary effort is normal.   Abdominal:      General: There is no distension.      Tenderness: There is abdominal tenderness. There is no guarding or rebound.      Comments: Large abdominal incision over the RUQ with staples, no erythema or drainage noted. Drain in place with scant amount of bilious fluid.   Skin:     Coloration: Skin is not jaundiced.   Neurological:      Mental Status: He is alert. Mental status is at baseline.       Laboratory: All labs within last 24 hours reviewed.     Imaging: All imaging within last 24 hours reviewed.     Infusions:     lactated ringers   Intravenous Continuous 75 mL/hr at 08/25/24 0746 New Bag at 08/25/24 0746       Scheduled Medications:    acetaminophen  1,000 mg Oral Q8H    allopurinoL  300 mg Oral Daily    amLODIPine  10 mg Oral Daily    aspirin  162 mg Oral Daily    atorvastatin  80 mg Oral Daily    losartan  100 mg Oral Daily    metoprolol succinate  25 mg Oral QHS    mirtazapine  30 mg Oral QHS    pantoprazole  40 mg Oral BID    piperacillin-tazobactam (ZOSYN) 4.5 g in D5W 100 mL IVPB (MB+)  4.5 g Intravenous Q8H    senna-docusate 8.6-50 mg  1 tablet Oral BID    tamsulosin  0.8 mg Oral QHS    traZODone  50 mg Oral QHS       PRN Medications:     Current Facility-Administered Medications:     albuterol-ipratropium, 3 mL, Nebulization, Q6H PRN     aluminum-magnesium hydroxide-simethicone, 30 mL, Oral, QID PRN    dextrose 10%, 12.5 g, Intravenous, PRN    dextrose 10%, 25 g, Intravenous, PRN    glucagon (human recombinant), 1 mg, Intramuscular, PRN    glucose, 16 g, Oral, PRN    glucose, 24 g, Oral, PRN    HYDROmorphone, 1 mg, Intravenous, Q3H PRN    melatonin, 6 mg, Oral, Nightly PRN    naloxone, 0.02 mg, Intravenous, PRN    nitroGLYCERIN, 0.4 mg, Sublingual, Q5 Min PRN    ondansetron, 4 mg, Intravenous, Q6H PRN    oxyCODONE, 15 mg, Oral, Q4H PRN    oxyCODONE, 10 mg, Oral, Q4H PRN    prochlorperazine, 5 mg, Intravenous, Q6H PRN    simethicone, 1 tablet, Oral, QID PRN    sodium chloride 0.9%, 10 mL, Intravenous, Q12H PRN    ASSESSMENT:    63 year old male with CAD, CABG, AAA repair with aortic bi-iliac stent on ASA and Plavix, chronic back pain admitted to outside hospital for cholecystitis s/p open cholecystectomy with placement of 19 Fr drain and post-operative course complicated by bile leak s/p unsuccessful ERCP transferred to [facility] for AES evaluation. He is currently afebrile, HDS and on Zosyn. He continues to experience RUQ abdominal pain and appears the amount of fluid in the drain is somewhat decreasing. His liver enzymes are completely normal.   RECOMMENDATIONS:    -Given persistent abdominal pain and concern for bile leak, plan for ERCP on 8/26. NPO at midnight.   -Please continue to hold both ASA and Plavix.   -Pain control and antibiotic management per primary team.     Thank you for allowing us to participate in the care of this patient. Please call with questions.      Kalani Torres MD  Gastroenterology Fellow, PGY-V  [facility]

## 2024-08-25 NOTE — ASSESSMENT & PLAN NOTE
Chronic, controlled. Latest blood pressure and vitals reviewed-     Temp:  [97.4 °F (36.3 °C)-98.3 °F (36.8 °C)]   Pulse:  [62-83]   Resp:  [16-18]   BP: (118-145)/(64-82)   SpO2:  [93 %-98 %] .   Home meds for hypertension were reviewed and noted below.   Hypertension Medications               amLODIPine (NORVASC) 10 MG tablet Take 10 mg by mouth once daily.    losartan (COZAAR) 100 MG tablet Take 100 mg by mouth once daily.    metoprolol succinate (TOPROL-XL) 25 MG 24 hr tablet Take 25 mg by mouth every evening.    nitroGLYCERIN (NITROSTAT) 0.4 MG SL tablet Place 0.4 mg under the tongue every 5 (five) minutes as needed for Chest pain.            While in the hospital, will manage blood pressure as follows; Continue home antihypertensive regimen    Will utilize p.r.n. blood pressure medication only if patient's blood pressure greater than 180/110 and he develops symptoms such as worsening chest pain or shortness of breath.

## 2024-08-25 NOTE — CARE UPDATE
I have reviewed the chart of Adam Persaud who is hospitalized for the following:    Active Hospital Problems    Diagnosis    *Calculus of gallbladder with acute cholecystitis without obstruction    Nicotine dependence     [place] on cessation      Pain     Prn pain medicine      History of coronary artery bypass graft    Diarrhea     C/o   On stool softeners  Rule out cdiff on admission      S/P AAA (abdominal aortic aneurysm) repair    CAD (coronary artery disease)    BPH (benign prostatic hyperplasia)    ACP (advance care planning)    Prediabetes    Gout    Insomnia     On trazadone      Lumbar spondylosis    HTN (hypertension)    HLD (hyperlipidemia)        Olivia Cruz NP  Unit Based [facility]

## 2024-08-25 NOTE — PLAN OF CARE
Problem: Anxiety  Goal: Anxiety Reduction or Resolution  Outcome: Progressing     Problem: Pain Acute  Goal: Optimal Pain Control and Function  Outcome: Progressing   Plan of care discussed.answered questions and concerns.  PRN around the clock, NPO MN, drain intact. IV fluid infusing.

## 2024-08-25 NOTE — PLAN OF CARE
Problem: Adult Inpatient Plan of Care  Goal: Plan of Care Review  8/25/2024 0553 by Teresa Madera LPN  Outcome: Progressing  8/25/2024 0553 by Teresa Madera LPN  Outcome: Progressing  Goal: Patient-Specific Goal (Individualized)  8/25/2024 0553 by Teresa Madera LPN  Outcome: Progressing  8/25/2024 0553 by Teresa Madera LPN  Outcome: Progressing  Goal: Absence of Hospital-Acquired Illness or Injury  8/25/2024 0553 by Teresa Madera LPN  Outcome: Progressing  8/25/2024 0553 by Teresa Madera LPN  Outcome: Progressing  Goal: Optimal Comfort and Wellbeing  8/25/2024 0553 by Tersea Madera LPN  Outcome: Progressing  8/25/2024 0553 by Teresa Madera LPN  Outcome: Progressing  Goal: Readiness for Transition of Care  8/25/2024 0553 by Teresa Madera LPN  Outcome: Progressing  8/25/2024 0553 by Teresa Madera LPN  Outcome: Progressing     Problem: Wound  Goal: Optimal Coping  8/25/2024 0553 by Teresa Madera LPN  Outcome: Progressing  8/25/2024 0553 by Teresa Madera LPN  Outcome: Progressing  Goal: Optimal Functional Ability  8/25/2024 0553 by Teresa Madera LPN  Outcome: Progressing  8/25/2024 0553 by Teresa Madera LPN  Outcome: Progressing  Goal: Absence of Infection Signs and Symptoms  8/25/2024 0553 by Teresa Madera LPN  Outcome: Progressing  8/25/2024 0553 by Teresa Madera LPN  Outcome: Progressing  Goal: Improved Oral Intake  8/25/2024 0553 by Teresa Madera LPN  Outcome: Progressing  8/25/2024 0553 by Teresa Madera LPN  Outcome: Progressing  Goal: Optimal Pain Control and Function  8/25/2024 0553 by Teresa Madera LPN  Outcome: Progressing  8/25/2024 0553 by Teresa Madera LPN  Outcome: Progressing  Goal: Skin Health and Integrity  8/25/2024 0553 by Teresa Madera LPN  Outcome: Progressing  8/25/2024 0553 by Teresa Madera LPN  Outcome: Progressing  Goal: Optimal Wound Healing  8/25/2024 0553 by Teresa Madera LPN  Outcome:  Progressing  8/25/2024 0553 by Teresa Madera LPN  Outcome: Progressing     Problem: Fall Injury Risk  Goal: Absence of Fall and Fall-Related Injury  8/25/2024 0553 by eTresa Madera LPN  Outcome: Progressing  8/25/2024 0553 by Teresa Madera LPN  Outcome: Progressing

## 2024-08-25 NOTE — PROGRESS NOTES
[facility] - Neurosurgery ([facility])  [facility] Medicine  Progress Note    Patient Name: Adam Persaud  MRN: [id number]  Patient Class: IP- Inpatient   Admission Date: 8/24/2024  Length of Stay: 1 days  Attending Physician: Ahmet Mahoney MD  Primary Care Provider: Liv Dai MD        Subjective:     Principal Problem:Calculus of gallbladder with acute cholecystitis without obstruction        HPI:  63M with PMH of CAD s/p CABG, chronic back pain, gout, HTN, HLD, endovascular aortic aneurysm repair (July 2024) admitted to [facility] on 8/16 for acute cholecystitis which was confirmed with imaging. General surgery performed cholecystectomy with MONIQUE drain placement on 8/19, laparoscopic cholecystectomy was converted to an open cholecystectomy as procedure was complicated by gallbladder necrosis and adherence to bowels with approximately 2100 mL estimated blood loss. He was monitored in the ICU post-operatively, but has subsequently stepped down to a floor bed. Abdominal pain persisted postoperatively requiring PCA. General Surgery noted MONIQUE drain had bilious drainage on 8/21. Gastroenterology evaluated patient for concern of bile duct leak. HIDA scan on 8/23 had concern for bile leak into the peritoneal cavity/MONIQUE drain. He underwent ERCP on 8/23 but unable to cannulate the bile duct. Referring team spoke with AES at [facility] and patient subsequently transferred for AES evaluation. Patient currently HDS and pain well controlled off PCA. Labwork today: Sodium 139, potassium 3.7, chloride 103, CO2 28, BUN 7, creatinine 0.65, glucose 96, total bilirubin 1, AST 24, ALT 15, white blood cells 6.43, hemoglobin 10.4, hematocrit 31.7, platelets 331. He will be admitted to hospital medicine service for further management.     Overview/Hospital Course:  No notes on file    Interval History: NAEON. MONIQUE drain with bilious output. Pain well controlled. LFTs WNL. Planning for ERCP tomorrow.     Review of Systems   Constitutional:   Positive for activity change. Negative for appetite change, chills, diaphoresis, fatigue, fever and unexpected weight change.   Respiratory:  Negative for cough, chest tightness, shortness of breath, wheezing and stridor.    Cardiovascular:  Negative for chest pain, palpitations and leg swelling.   Gastrointestinal:  Positive for abdominal distention and abdominal pain. Negative for blood in stool, constipation, diarrhea, nausea and vomiting.   Endocrine: Negative for cold intolerance and heat intolerance.   Genitourinary:  Negative for difficulty urinating, dysuria, flank pain and frequency.   Musculoskeletal:  Positive for back pain and gait problem. Negative for arthralgias, joint swelling and myalgias.   Skin: Negative.    Neurological:  Negative for dizziness, weakness, light-headedness and headaches.     Objective:     Vital Signs (Most Recent):  Temp: 97.7 °F (36.5 °C) (08/25/24 1144)  Pulse: 74 (08/25/24 1144)  Resp: 18 (08/25/24 1144)  BP: 131/73 (08/25/24 1144)  SpO2: 95 % (08/25/24 1144) Vital Signs (24h Range):  Temp:  [97.4 °F (36.3 °C)-98.3 °F (36.8 °C)] 97.7 °F (36.5 °C)  Pulse:  [62-83] 74  Resp:  [16-18] 18  SpO2:  [93 %-98 %] 95 %  BP: (118-145)/(64-82) 131/73     Weight: 79.7 kg (175 lb 11.3 oz)  Body mass index is 26.72 kg/m².    Intake/Output Summary (Last 24 hours) at 8/25/2024 1207  Last data filed at 8/25/2024 0955  Gross per 24 hour   Intake 393.96 ml   Output 290 ml   Net 103.96 ml         Physical Exam  Constitutional:       General: He is not in acute distress.     Appearance: He is ill-appearing. He is not toxic-appearing.   HENT:      Head: Normocephalic and atraumatic.   Eyes:      Conjunctiva/sclera: Conjunctivae normal.      Pupils: Pupils are equal, round, and reactive to light.   Cardiovascular:      Rate and Rhythm: Normal rate and regular rhythm.      Heart sounds: Normal heart sounds.   Pulmonary:      Effort: Pulmonary effort is normal. No respiratory distress.      Breath  sounds: Normal breath sounds. No wheezing or rales.   Abdominal:      General: Bowel sounds are normal. There is distension.      Palpations: Abdomen is soft.      Tenderness: There is abdominal tenderness.      Comments: MONIQUE drain with bilious output. Abdominal incisions with dressing in place CDI   Musculoskeletal:         General: Normal range of motion.      Cervical back: Normal range of motion and neck supple.      Right lower leg: No edema.      Left lower leg: No edema.   Skin:     General: Skin is warm and dry.   Neurological:      General: No focal deficit present.      Mental Status: He is alert and oriented to person, place, and time. Mental status is at baseline.   Psychiatric:         Mood and Affect: Mood normal.         Behavior: Behavior normal.             Significant Labs: All pertinent labs within the past 24 hours have been reviewed.    Significant Imaging: I have reviewed all pertinent imaging results/findings within the past 24 hours.    Assessment/Plan:      * Calculus of gallbladder with acute cholecystitis without obstruction  S/p open noe 8/19, complicated by necrosis and adherence to bowel wall with 2100cc blood loss  HIDA 8/23 concerning for bile leak/CBD obstruction   ERCP 8/23, unable to cannulate the bile duct  Transferred to [facility] for AES evaluation  Continue Zosyn   AES consulted  Pain control   Continue to hold asa/plavix   ERCP planned for tomorrow. NPO at midnight.    S/P AAA (abdominal aortic aneurysm) repair  Known AAA atherosclerotic arterial disease  8/16 CTA aorto bi-iliac stent appears in satisfactory position with no stenosis or extravasation of the stent, no stenosis  Continue beta blocker and BP control       ACP (advance care planning)  Full code      BPH (benign prostatic hyperplasia)  Cont tamsulosin      Gout  Cont allopurinol      Prediabetes  Hemoglobin A1c of 5.7        CAD (coronary artery disease)  ASA/plavix held for procedure    Lumbar  spondylosis  Pain control      HLD (hyperlipidemia)  Cont statin      HTN (hypertension)  Chronic, controlled. Latest blood pressure and vitals reviewed-     Temp:  [97.4 °F (36.3 °C)-98.3 °F (36.8 °C)]   Pulse:  [62-83]   Resp:  [16-18]   BP: (118-145)/(64-82)   SpO2:  [93 %-98 %] .   Home meds for hypertension were reviewed and noted below.   Hypertension Medications               amLODIPine (NORVASC) 10 MG tablet Take 10 mg by mouth once daily.    losartan (COZAAR) 100 MG tablet Take 100 mg by mouth once daily.    metoprolol succinate (TOPROL-XL) 25 MG 24 hr tablet Take 25 mg by mouth every evening.    nitroGLYCERIN (NITROSTAT) 0.4 MG SL tablet Place 0.4 mg under the tongue every 5 (five) minutes as needed for Chest pain.            While in the hospital, will manage blood pressure as follows; Continue home antihypertensive regimen    Will utilize p.r.n. blood pressure medication only if patient's blood pressure greater than 180/110 and he develops symptoms such as worsening chest pain or shortness of breath.      VTE Risk Mitigation (From admission, onward)           Ordered     Reason for No Pharmacological VTE Prophylaxis  Once        Question:  Reasons:  Answer:  Risk of Bleeding    08/24/24 1525     IP VTE HIGH RISK PATIENT  Once         08/24/24 1525     Place sequential compression device  Until discontinued         08/24/24 1525                    Discharge Planning   TIM:      Code Status: Full Code   Is the patient medically ready for discharge?:     Reason for patient still in hospital (select all that apply): Treatment                     Ahmet Mahoney MD  Department of Hospital Medicine   [facility] Neurosurgery ([facility])

## 2024-08-25 NOTE — SUBJECTIVE & OBJECTIVE
Interval History: NAEON. MONIQUE drain with bilious output. Pain well controlled. LFTs WNL. Planning for ERCP tomorrow.     Review of Systems   Constitutional:  Positive for activity change. Negative for appetite change, chills, diaphoresis, fatigue, fever and unexpected weight change.   Respiratory:  Negative for cough, chest tightness, shortness of breath, wheezing and stridor.    Cardiovascular:  Negative for chest pain, palpitations and leg swelling.   Gastrointestinal:  Positive for abdominal distention and abdominal pain. Negative for blood in stool, constipation, diarrhea, nausea and vomiting.   Endocrine: Negative for cold intolerance and heat intolerance.   Genitourinary:  Negative for difficulty urinating, dysuria, flank pain and frequency.   Musculoskeletal:  Positive for back pain and gait problem. Negative for arthralgias, joint swelling and myalgias.   Skin: Negative.    Neurological:  Negative for dizziness, weakness, light-headedness and headaches.     Objective:     Vital Signs (Most Recent):  Temp: 97.7 °F (36.5 °C) (08/25/24 1144)  Pulse: 74 (08/25/24 1144)  Resp: 18 (08/25/24 1144)  BP: 131/73 (08/25/24 1144)  SpO2: 95 % (08/25/24 1144) Vital Signs (24h Range):  Temp:  [97.4 °F (36.3 °C)-98.3 °F (36.8 °C)] 97.7 °F (36.5 °C)  Pulse:  [62-83] 74  Resp:  [16-18] 18  SpO2:  [93 %-98 %] 95 %  BP: (118-145)/(64-82) 131/73     Weight: 79.7 kg (175 lb 11.3 oz)  Body mass index is 26.72 kg/m².    Intake/Output Summary (Last 24 hours) at 8/25/2024 1207  Last data filed at 8/25/2024 0955  Gross per 24 hour   Intake 393.96 ml   Output 290 ml   Net 103.96 ml         Physical Exam  Constitutional:       General: He is not in acute distress.     Appearance: He is ill-appearing. He is not toxic-appearing.   HENT:      Head: Normocephalic and atraumatic.   Eyes:      Conjunctiva/sclera: Conjunctivae normal.      Pupils: Pupils are equal, round, and reactive to light.   Cardiovascular:      Rate and Rhythm: Normal rate  and regular rhythm.      Heart sounds: Normal heart sounds.   Pulmonary:      Effort: Pulmonary effort is normal. No respiratory distress.      Breath sounds: Normal breath sounds. No wheezing or rales.   Abdominal:      General: Bowel sounds are normal. There is distension.      Palpations: Abdomen is soft.      Tenderness: There is abdominal tenderness.      Comments: MONIQUE drain with bilious output. Abdominal incisions with dressing in place CDI   Musculoskeletal:         General: Normal range of motion.      Cervical back: Normal range of motion and neck supple.      Right lower leg: No edema.      Left lower leg: No edema.   Skin:     General: Skin is warm and dry.   Neurological:      General: No focal deficit present.      Mental Status: He is alert and oriented to person, place, and time. Mental status is at baseline.   Psychiatric:         Mood and Affect: Mood normal.         Behavior: Behavior normal.             Significant Labs: All pertinent labs within the past 24 hours have been reviewed.    Significant Imaging: I have reviewed all pertinent imaging results/findings within the past 24 hours.

## 2024-08-25 NOTE — PLAN OF CARE
SW attempted to complete PETER with patient; patient sleeping. SW will try to re-attempt the PETER at a later time.     LORENA Radford  [facility]   Ext: [phone]
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
## 2024-08-26 ENCOUNTER — ANESTHESIA (OUTPATIENT)
Dept: ENDOSCOPY | Facility: HOSPITAL | Age: 64
End: 2024-08-26
Payer: MEDICARE

## 2024-08-26 LAB
ALBUMIN SERPL BCP-MCNC: 2.5 G/DL (ref 3.5–5.2)
ALP SERPL-CCNC: 102 U/L (ref 55–135)
ALT SERPL W/O P-5'-P-CCNC: 12 U/L (ref 10–44)
ANION GAP SERPL CALC-SCNC: 8 MMOL/L (ref 8–16)
AST SERPL-CCNC: 16 U/L (ref 10–40)
BASOPHILS # BLD AUTO: 0.04 K/UL (ref 0–0.2)
BASOPHILS NFR BLD: 0.6 % (ref 0–1.9)
BILIRUB SERPL-MCNC: 0.5 MG/DL (ref 0.1–1)
BUN SERPL-MCNC: 5 MG/DL (ref 8–23)
CALCIUM SERPL-MCNC: 8.9 MG/DL (ref 8.7–10.5)
CHLORIDE SERPL-SCNC: 109 MMOL/L (ref 95–110)
CO2 SERPL-SCNC: 24 MMOL/L (ref 23–29)
CREAT SERPL-MCNC: 0.7 MG/DL (ref 0.5–1.4)
DIFFERENTIAL METHOD BLD: ABNORMAL
EOSINOPHIL # BLD AUTO: 0.2 K/UL (ref 0–0.5)
EOSINOPHIL NFR BLD: 2.2 % (ref 0–8)
ERYTHROCYTE [DISTWIDTH] IN BLOOD BY AUTOMATED COUNT: 15.2 % (ref 11.5–14.5)
EST. GFR  (NO RACE VARIABLE): >60 ML/MIN/1.73 M^2
GLUCOSE SERPL-MCNC: 74 MG/DL (ref 70–110)
HCT VFR BLD AUTO: 34.2 % (ref 40–54)
HGB BLD-MCNC: 10.7 G/DL (ref 14–18)
IMM GRANULOCYTES # BLD AUTO: 0.03 K/UL (ref 0–0.04)
IMM GRANULOCYTES NFR BLD AUTO: 0.4 % (ref 0–0.5)
LYMPHOCYTES # BLD AUTO: 1.8 K/UL (ref 1–4.8)
LYMPHOCYTES NFR BLD: 25.1 % (ref 18–48)
MAGNESIUM SERPL-MCNC: 1.9 MG/DL (ref 1.6–2.6)
MCH RBC QN AUTO: 26.8 PG (ref 27–31)
MCHC RBC AUTO-ENTMCNC: 31.3 G/DL (ref 32–36)
MCV RBC AUTO: 86 FL (ref 82–98)
MONOCYTES # BLD AUTO: 0.7 K/UL (ref 0.3–1)
MONOCYTES NFR BLD: 9.1 % (ref 4–15)
NEUTROPHILS # BLD AUTO: 4.5 K/UL (ref 1.8–7.7)
NEUTROPHILS NFR BLD: 62.6 % (ref 38–73)
NRBC BLD-RTO: 0 /100 WBC
PHOSPHATE SERPL-MCNC: 3.8 MG/DL (ref 2.7–4.5)
PLATELET # BLD AUTO: 382 K/UL (ref 150–450)
PMV BLD AUTO: 10.8 FL (ref 9.2–12.9)
POCT GLUCOSE: 88 MG/DL (ref 70–110)
POTASSIUM SERPL-SCNC: 3.6 MMOL/L (ref 3.5–5.1)
PROT SERPL-MCNC: 6.3 G/DL (ref 6–8.4)
RBC # BLD AUTO: 4 M/UL (ref 4.6–6.2)
SODIUM SERPL-SCNC: 141 MMOL/L (ref 136–145)
WBC # BLD AUTO: 7.16 K/UL (ref 3.9–12.7)

## 2024-08-26 PROCEDURE — C2617 STENT, NON-COR, TEM W/O DEL: HCPCS | Performed by: INTERNAL MEDICINE

## 2024-08-26 PROCEDURE — 43274 ERCP DUCT STENT PLACEMENT: CPT | Mod: 22,,, | Performed by: INTERNAL MEDICINE

## 2024-08-26 PROCEDURE — 63600175 PHARM REV CODE 636 W HCPCS: Performed by: INTERNAL MEDICINE

## 2024-08-26 PROCEDURE — 36415 COLL VENOUS BLD VENIPUNCTURE: CPT | Performed by: INTERNAL MEDICINE

## 2024-08-26 PROCEDURE — 27201274 HC FORCEPS, SPYBITE: Performed by: INTERNAL MEDICINE

## 2024-08-26 PROCEDURE — 25000003 PHARM REV CODE 250: Performed by: INTERNAL MEDICINE

## 2024-08-26 PROCEDURE — 80053 COMPREHEN METABOLIC PANEL: CPT | Performed by: INTERNAL MEDICINE

## 2024-08-26 PROCEDURE — 37000009 HC ANESTHESIA EA ADD 15 MINS: Performed by: INTERNAL MEDICINE

## 2024-08-26 PROCEDURE — 27201674 HC SPHINCTERTOME: Performed by: INTERNAL MEDICINE

## 2024-08-26 PROCEDURE — 11000001 HC ACUTE MED/SURG PRIVATE ROOM

## 2024-08-26 PROCEDURE — 27202127 HC STENT INTRODUCER: Performed by: INTERNAL MEDICINE

## 2024-08-26 PROCEDURE — 99900035 HC TECH TIME PER 15 MIN (STAT)

## 2024-08-26 PROCEDURE — 43264 ERCP REMOVE DUCT CALCULI: CPT | Performed by: INTERNAL MEDICINE

## 2024-08-26 PROCEDURE — 63600175 PHARM REV CODE 636 W HCPCS

## 2024-08-26 PROCEDURE — 0F7D8DZ DILATION OF PANCREATIC DUCT WITH INTRALUMINAL DEVICE, VIA NATURAL OR ARTIFICIAL OPENING ENDOSCOPIC: ICD-10-PCS | Performed by: INTERNAL MEDICINE

## 2024-08-26 PROCEDURE — 27202125 HC BALLOON, EXTRACTION (ANY): Performed by: INTERNAL MEDICINE

## 2024-08-26 PROCEDURE — 43264 ERCP REMOVE DUCT CALCULI: CPT | Mod: 51,,, | Performed by: INTERNAL MEDICINE

## 2024-08-26 PROCEDURE — 63600175 PHARM REV CODE 636 W HCPCS: Performed by: ANESTHESIOLOGY

## 2024-08-26 PROCEDURE — C1769 GUIDE WIRE: HCPCS | Performed by: INTERNAL MEDICINE

## 2024-08-26 PROCEDURE — 43274 ERCP DUCT STENT PLACEMENT: CPT | Mod: 59 | Performed by: INTERNAL MEDICINE

## 2024-08-26 PROCEDURE — 84100 ASSAY OF PHOSPHORUS: CPT | Performed by: INTERNAL MEDICINE

## 2024-08-26 PROCEDURE — 0FC98ZZ EXTIRPATION OF MATTER FROM COMMON BILE DUCT, VIA NATURAL OR ARTIFICIAL OPENING ENDOSCOPIC: ICD-10-PCS | Performed by: INTERNAL MEDICINE

## 2024-08-26 PROCEDURE — 0F798DZ DILATION OF COMMON BILE DUCT WITH INTRALUMINAL DEVICE, VIA NATURAL OR ARTIFICIAL OPENING ENDOSCOPIC: ICD-10-PCS | Performed by: INTERNAL MEDICINE

## 2024-08-26 PROCEDURE — 83735 ASSAY OF MAGNESIUM: CPT | Performed by: INTERNAL MEDICINE

## 2024-08-26 PROCEDURE — 25000003 PHARM REV CODE 250

## 2024-08-26 PROCEDURE — 94761 N-INVAS EAR/PLS OXIMETRY MLT: CPT

## 2024-08-26 PROCEDURE — 85025 COMPLETE CBC W/AUTO DIFF WBC: CPT | Performed by: INTERNAL MEDICINE

## 2024-08-26 PROCEDURE — 37000008 HC ANESTHESIA 1ST 15 MINUTES: Performed by: INTERNAL MEDICINE

## 2024-08-26 PROCEDURE — 25500020 PHARM REV CODE 255: Performed by: INTERNAL MEDICINE

## 2024-08-26 RX ORDER — HALOPERIDOL 5 MG/ML
0.5 INJECTION INTRAMUSCULAR EVERY 10 MIN PRN
Status: DISCONTINUED | OUTPATIENT
Start: 2024-08-26 | End: 2024-08-26 | Stop reason: HOSPADM

## 2024-08-26 RX ORDER — INDOMETHACIN 50 MG/1
SUPPOSITORY RECTAL
Status: DISCONTINUED | OUTPATIENT
Start: 2024-08-26 | End: 2024-08-26 | Stop reason: HOSPADM

## 2024-08-26 RX ORDER — SODIUM CHLORIDE 0.9 % (FLUSH) 0.9 %
3 SYRINGE (ML) INJECTION
Status: DISCONTINUED | OUTPATIENT
Start: 2024-08-26 | End: 2024-08-26 | Stop reason: HOSPADM

## 2024-08-26 RX ORDER — HYDROMORPHONE HYDROCHLORIDE 1 MG/ML
0.2 INJECTION, SOLUTION INTRAMUSCULAR; INTRAVENOUS; SUBCUTANEOUS EVERY 5 MIN PRN
Status: COMPLETED | OUTPATIENT
Start: 2024-08-26 | End: 2024-08-26

## 2024-08-26 RX ORDER — GLUCAGON 1 MG
1 KIT INJECTION
Status: DISCONTINUED | OUTPATIENT
Start: 2024-08-26 | End: 2024-08-26 | Stop reason: HOSPADM

## 2024-08-26 RX ORDER — HYDROMORPHONE HYDROCHLORIDE 1 MG/ML
INJECTION, SOLUTION INTRAMUSCULAR; INTRAVENOUS; SUBCUTANEOUS
Status: COMPLETED
Start: 2024-08-26 | End: 2024-08-26

## 2024-08-26 RX ORDER — PROPOFOL 10 MG/ML
VIAL (ML) INTRAVENOUS
Status: DISCONTINUED | OUTPATIENT
Start: 2024-08-26 | End: 2024-08-26

## 2024-08-26 RX ORDER — ONDANSETRON HYDROCHLORIDE 2 MG/ML
4 INJECTION, SOLUTION INTRAVENOUS ONCE AS NEEDED
Status: DISCONTINUED | OUTPATIENT
Start: 2024-08-26 | End: 2024-08-26 | Stop reason: HOSPADM

## 2024-08-26 RX ORDER — PROPOFOL 10 MG/ML
VIAL (ML) INTRAVENOUS CONTINUOUS PRN
Status: DISCONTINUED | OUTPATIENT
Start: 2024-08-26 | End: 2024-08-26

## 2024-08-26 RX ORDER — SODIUM CHLORIDE, SODIUM LACTATE, POTASSIUM CHLORIDE, CALCIUM CHLORIDE 600; 310; 30; 20 MG/100ML; MG/100ML; MG/100ML; MG/100ML
INJECTION, SOLUTION INTRAVENOUS CONTINUOUS
Status: ACTIVE | OUTPATIENT
Start: 2024-08-26 | End: 2024-08-27

## 2024-08-26 RX ORDER — LIDOCAINE HYDROCHLORIDE 20 MG/ML
INJECTION INTRAVENOUS
Status: DISCONTINUED | OUTPATIENT
Start: 2024-08-26 | End: 2024-08-26

## 2024-08-26 RX ORDER — SODIUM CHLORIDE 9 MG/ML
INJECTION, SOLUTION INTRAVENOUS CONTINUOUS
Status: DISCONTINUED | OUTPATIENT
Start: 2024-08-26 | End: 2024-08-26

## 2024-08-26 RX ADMIN — ATORVASTATIN CALCIUM 80 MG: 40 TABLET, FILM COATED ORAL at 07:08

## 2024-08-26 RX ADMIN — AMLODIPINE BESYLATE 10 MG: 10 TABLET ORAL at 07:08

## 2024-08-26 RX ADMIN — HYDROMORPHONE HYDROCHLORIDE 0.2 MG: 1 INJECTION, SOLUTION INTRAMUSCULAR; INTRAVENOUS; SUBCUTANEOUS at 12:08

## 2024-08-26 RX ADMIN — HYDROMORPHONE HYDROCHLORIDE 0.2 MG: 1 INJECTION, SOLUTION INTRAMUSCULAR; INTRAVENOUS; SUBCUTANEOUS at 01:08

## 2024-08-26 RX ADMIN — SODIUM CHLORIDE, POTASSIUM CHLORIDE, SODIUM LACTATE AND CALCIUM CHLORIDE: 600; 310; 30; 20 INJECTION, SOLUTION INTRAVENOUS at 01:08

## 2024-08-26 RX ADMIN — OXYCODONE HYDROCHLORIDE 15 MG: 5 TABLET ORAL at 05:08

## 2024-08-26 RX ADMIN — OXYCODONE HYDROCHLORIDE 15 MG: 5 TABLET ORAL at 11:08

## 2024-08-26 RX ADMIN — PIPERACILLIN SODIUM AND TAZOBACTAM SODIUM 4.5 G: 4; .5 INJECTION, POWDER, FOR SOLUTION INTRAVENOUS at 11:08

## 2024-08-26 RX ADMIN — HYDROMORPHONE HYDROCHLORIDE 1 MG: 1 INJECTION, SOLUTION INTRAMUSCULAR; INTRAVENOUS; SUBCUTANEOUS at 05:08

## 2024-08-26 RX ADMIN — TIZANIDINE 4 MG: 4 TABLET ORAL at 07:08

## 2024-08-26 RX ADMIN — ACETAMINOPHEN 1000 MG: 325 TABLET ORAL at 05:08

## 2024-08-26 RX ADMIN — LIDOCAINE HYDROCHLORIDE 60 MG: 20 INJECTION INTRAVENOUS at 10:08

## 2024-08-26 RX ADMIN — PROPOFOL 70 MG: 10 INJECTION, EMULSION INTRAVENOUS at 10:08

## 2024-08-26 RX ADMIN — TAMSULOSIN HYDROCHLORIDE 0.8 MG: 0.4 CAPSULE ORAL at 10:08

## 2024-08-26 RX ADMIN — ONDANSETRON 4 MG: 2 INJECTION INTRAMUSCULAR; INTRAVENOUS at 05:08

## 2024-08-26 RX ADMIN — GLYCOPYRROLATE 0.1 MG: 0.2 INJECTION, SOLUTION INTRAMUSCULAR; INTRAVENOUS at 10:08

## 2024-08-26 RX ADMIN — GABAPENTIN 800 MG: 400 CAPSULE ORAL at 10:08

## 2024-08-26 RX ADMIN — PIPERACILLIN SODIUM AND TAZOBACTAM SODIUM 4.5 G: 4; .5 INJECTION, POWDER, FOR SOLUTION INTRAVENOUS at 05:08

## 2024-08-26 RX ADMIN — PROPOFOL 150 MCG/KG/MIN: 10 INJECTION, EMULSION INTRAVENOUS at 10:08

## 2024-08-26 RX ADMIN — PANTOPRAZOLE SODIUM 40 MG: 20 TABLET, DELAYED RELEASE ORAL at 10:08

## 2024-08-26 RX ADMIN — SODIUM CHLORIDE: 9 INJECTION, SOLUTION INTRAVENOUS at 10:08

## 2024-08-26 RX ADMIN — SIMETHICONE 80 MG: 80 TABLET, CHEWABLE ORAL at 01:08

## 2024-08-26 RX ADMIN — HYDROMORPHONE HYDROCHLORIDE 1 MG: 1 INJECTION, SOLUTION INTRAMUSCULAR; INTRAVENOUS; SUBCUTANEOUS at 07:08

## 2024-08-26 RX ADMIN — TRAZODONE HYDROCHLORIDE 50 MG: 50 TABLET ORAL at 10:08

## 2024-08-26 RX ADMIN — PROCHLORPERAZINE EDISYLATE 5 MG: 5 INJECTION INTRAMUSCULAR; INTRAVENOUS at 05:08

## 2024-08-26 RX ADMIN — TIZANIDINE 4 MG: 4 TABLET ORAL at 11:08

## 2024-08-26 RX ADMIN — METOPROLOL SUCCINATE 25 MG: 25 TABLET, EXTENDED RELEASE ORAL at 10:08

## 2024-08-26 RX ADMIN — ACETAMINOPHEN 1000 MG: 325 TABLET ORAL at 11:08

## 2024-08-26 RX ADMIN — PANTOPRAZOLE SODIUM 40 MG: 20 TABLET, DELAYED RELEASE ORAL at 07:08

## 2024-08-26 RX ADMIN — ALLOPURINOL 300 MG: 100 TABLET ORAL at 07:08

## 2024-08-26 RX ADMIN — PIPERACILLIN SODIUM AND TAZOBACTAM SODIUM 4.5 G: 4; .5 INJECTION, POWDER, FOR SOLUTION INTRAVENOUS at 01:08

## 2024-08-26 RX ADMIN — MIRTAZAPINE 30 MG: 7.5 TABLET, FILM COATED ORAL at 10:08

## 2024-08-26 RX ADMIN — OXYCODONE HYDROCHLORIDE 15 MG: 5 TABLET ORAL at 12:08

## 2024-08-26 RX ADMIN — PROPOFOL 20 MG: 10 INJECTION, EMULSION INTRAVENOUS at 10:08

## 2024-08-26 RX ADMIN — ACETAMINOPHEN 1000 MG: 325 TABLET ORAL at 01:08

## 2024-08-26 RX ADMIN — LOSARTAN POTASSIUM 100 MG: 50 TABLET, FILM COATED ORAL at 07:08

## 2024-08-26 NOTE — H&P
Short Stay Endoscopy History and Physical    PCP - Liv Dai MD  Referring Physician - Ahmet Mahoney MD  [address] [place]  [place] [address]    Procedure - ERCP  ASA - per anesthesia  Mallampati - per anesthesia  History of Anesthesia problems - per anesthesia  Family history Anesthesia problems -  per anesthesia   Plan of anesthesia - per anesthesia    HPI:  This is a 63 y.o. male here for evaluation of: ERCP for failed cannulation on outside attempt for suspected bile leak post subtotal cholecystectomy and possible obstructed CBD with biliary type of RUQ abdo pains    Reflux - no  Dysphagia - no  Abdominal pain - yes  Diarrhea - no    ROS:  Constitutional: No fevers, chills, No weight loss  CV: No chest pain  Pulm: No cough, No shortness of breath  Ophtho: No vision changes  GI: see HPI  Derm: No rash    Medical History:  has a past medical history of Arthritis, Coronary artery disease, Gout, Hyperlipidemia, and Hypertension.    Surgical History:  has a past surgical history that includes Shoulder arthroscopy (Right); Coronary angioplasty with stent; index finger amputation (Left); Mandible surgery; Radiofrequency ablation of lumbar medial branch nerve at single level (Bilateral, 5/22/2018); Epidural steroid injection into cervical spine (N/A, 7/10/2018); Esophagogastroduodenoscopy (N/A, 3/14/2023); AAA repair, endovascular (Bilateral, 7/9/2024); Laparoscopic cholecystectomy (N/A, 8/19/2024); and ERCP (N/A, 8/23/2024).    Family History: family history is not on file..    Social History:  reports that he has been smoking cigarettes. He has a 60 pack-year smoking history. He has never used smokeless tobacco. He reports current alcohol use of about 1.0 standard drink of alcohol per week. He reports that he does not use drugs.    Review of patient's allergies indicates:  No Known Allergies    Medications:   Medications Prior to Admission   Medication Sig Dispense Refill Last Dose    allopurinol  (ZYLOPRIM) 300 MG tablet Take 300 mg by mouth once daily.       amLODIPine (NORVASC) 10 MG tablet Take 10 mg by mouth once daily.       aspirin 325 MG tablet Take 162.5 mg by mouth once daily.       clopidogrel (PLAVIX) 75 mg tablet Take 75 mg by mouth once daily.       D5W PgBk 100 mL with piperacillin-tazobactam 4.5 gram SolR 4.5 g Inject 4.5 g into the vein every 8 (eight) hours.       losartan (COZAAR) 100 MG tablet Take 100 mg by mouth once daily.       metoprolol succinate (TOPROL-XL) 25 MG 24 hr tablet Take 25 mg by mouth every evening.       mirtazapine (REMERON) 30 MG tablet Take 30 mg by mouth every evening.       nitroGLYCERIN (NITROSTAT) 0.4 MG SL tablet Place 0.4 mg under the tongue every 5 (five) minutes as needed for Chest pain.       oxyCODONE-acetaminophen (PERCOCET)  mg per tablet Take 1 tablet by mouth 4 (four) times daily as needed for Pain.       pantoprazole (PROTONIX) 40 MG tablet Take 40 mg by mouth 2 (two) times daily.       REPATHA SURECLICK 140 mg/mL PnIj Inject 140 mg into the skin every 14 (fourteen) days.       rosuvastatin (CRESTOR) 40 MG Tab Take 40 mg by mouth once daily.       tamsulosin (FLOMAX) 0.4 mg Cap Take 0.8 mg by mouth every evening.       traZODone (DESYREL) 50 MG tablet Take  mg by mouth every evening.          Physical Exam:    Vital Signs:   Vitals:    08/26/24 1155   BP: 101/62   Pulse: 76   Resp: 16   Temp: 96.8 °F (36 °C)       General Appearance: Well appearing in no acute distress    Labs:  Lab Results   Component Value Date    WBC 7.16 08/26/2024    HGB 10.7 (L) 08/26/2024    HCT 34.2 (L) 08/26/2024     08/26/2024    CHOL 81 (L) 06/19/2024    TRIG 149 06/19/2024    HDL 59 06/19/2024    ALT 12 08/26/2024    AST 16 08/26/2024     08/26/2024    K 3.6 08/26/2024     08/26/2024    CREATININE 0.7 08/26/2024    BUN 5 (L) 08/26/2024    CO2 24 08/26/2024    PSA 2.0 04/10/2023    INR 1.0 08/16/2024    HGBA1C 5.7 (H) 07/08/2024       I have  explained the risks and benefits of this endoscopic procedure to the patient including but not limited to bleeding, inflammation, infection, perforation, pancreatitis, missing a lesion and death.      Eric Hunter MD

## 2024-08-26 NOTE — TREATMENT PLAN
AES Treatment Plan    Impression:              - The major papilla appeared normal with a                          submucosal bulge seen in the likely intraduodenal                          portion of the distal CBD.                          - Cannulation was difficult due to an obstructive                          distal CBD stone.                          - Difficult cannulation required escalation of                          techniques to facilitate ductal access including                          both SpyBite forcep traction on the ampulla as                          well as double wire technique to facilitate both                          pancreatic and biliary ductal access.                          - A filling defect was seen on the cholangiogram                          just above level of ampulla consistent with distal                          CBD stone/sludge.                          - The middle third of the main bile duct and lower                          third of the main bile duct were mildly dilated,                          with a stone causing an obstruction.                          - Choledocholithiasis was found. Complete removal                          was accomplished by biliary sphincterotomy and                          balloon extraction.                          - An obvious bile leak was not observed during                          cholangiogram.                          - A biliary sphincterotomy was performed.                          - The biliary tree was swept.                          - One temporary 10Fr x 7cm plastic biliary stent                          was placed into the common bile duct. We will set                          up f/up in 6 weeks to remove this stent and                          further clear the CBD.                          - One temporary 5Fr x 3cm plastic pancreatic stent                          was placed into the ventral pancreatic duct, which                           should pass spontaneously on its own as intended.                          - Today's ERCP with difficult cannulation                          demonstrated increased complexity, challenge and                          risk requiring added time and escalation of                          advanced techniques to gain and maintain                          pancreaticobiliary ductal access as described                          above (22 modifer).     Recommendation:          - Continue antibiotics as taking for at least the                          next 5 days after stone extraction and stent                          placement.                          - Continue to monitor output from RUQ drain as per                          surgical follow up.                          - [facility].                          - Resume previous diet.                          - Resume Plavix (clopidogrel) at your prior dose                          in 3-5 days.                          - Repeat ERCP in 6 weeks to remove stent.                          - Return to referring physician.                          - Watch for pancreatitis, bleeding, perforation,                          and cholangitis.     Meenakshi Cortez MD  Gastroenterology and Hepatology Fellow, PGY-4  Pager # [phone]

## 2024-08-26 NOTE — ASSESSMENT & PLAN NOTE
Chronic, controlled. Latest blood pressure and vitals reviewed-     Temp:  [96.8 °F (36 °C)-98.6 °F (37 °C)]   Pulse:  [51-86]   Resp:  [12-20]   BP: (101-163)/(62-83)   SpO2:  [95 %-100 %] .   Home meds for hypertension were reviewed and noted below.   Hypertension Medications               amLODIPine (NORVASC) 10 MG tablet Take 10 mg by mouth once daily.    losartan (COZAAR) 100 MG tablet Take 100 mg by mouth once daily.    metoprolol succinate (TOPROL-XL) 25 MG 24 hr tablet Take 25 mg by mouth every evening.    nitroGLYCERIN (NITROSTAT) 0.4 MG SL tablet Place 0.4 mg under the tongue every 5 (five) minutes as needed for Chest pain.            While in the hospital, will manage blood pressure as follows; Continue home antihypertensive regimen    Will utilize p.r.n. blood pressure medication only if patient's blood pressure greater than 180/110 and he develops symptoms such as worsening chest pain or shortness of breath.

## 2024-08-26 NOTE — PROVATION PATIENT INSTRUCTIONS
Discharge Summary/Instructions after an Endoscopic Procedure  Patient Name: Adam Persaud  Patient MRN: [id number]  Patient YOB: 1960  Monday, August 26, 2024  Eric Hunter MD  Dear patient,  As a result of recent federal legislation (The Federal Cures Act), you may   receive lab or pathology results from your procedure in your [facility]   account before your physician is able to contact you. Your physician or   their representative will relay the results to you with their   recommendations at their soonest availability.  Thank you,  RESTRICTIONS:  During your procedure today, you received medications for sedation.  These   medications may affect your judgment, balance and coordination.  Therefore,   for 24 hours, you have the following restrictions:   - DO NOT drive a car, operate machinery, make legal/financial decisions,   sign important papers or drink alcohol.    ACTIVITY:  Today: no heavy lifting, straining or running due to procedural   sedation/anesthesia.  The following day: return to full activity including work.  DIET:  Eat and drink normally unless instructed otherwise.     TREATMENT FOR COMMON SIDE EFFECTS:  - Mild abdominal pain, nausea, belching, bloating or excessive gas:  rest,   eat lightly and use a heating pad.  - Sore Throat: treat with throat lozenges and/or gargle with warm salt   water.  - Because air was used during the procedure, expelling large amounts of air   from your rectum or belching is normal.  - If a bowel prep was taken, you may not have a bowel movement for 1-3 days.    This is normal.  SYMPTOMS TO WATCH FOR AND REPORT TO YOUR PHYSICIAN:  1. Abdominal pain or bloating, other than gas cramps.  2. Chest pain.  3. Back pain.  4. Signs of infection such as: chills or fever occurring within 24 hours   after the procedure.  5. Rectal bleeding, which would show as bright red, maroon, or black stools.   (A tablespoon of blood from the rectum is not serious, especially if    hemorrhoids are present.)  6. Vomiting.  7. Weakness or dizziness.  GO DIRECTLY TO THE NEAREST EMERGENCY ROOM IF YOU HAVE ANY OF THE FOLLOWING:      Difficulty breathing              Chills and/or fever over 101 F   Persistent vomiting and/or vomiting blood   Severe abdominal pain   Severe chest pain   Black, tarry stools   Bleeding- more than one tablespoon   Any other symptom or condition that you feel may need urgent attention  Your doctor recommends these additional instructions:  If any biopsies were taken, your doctors clinic will contact you in 1 to 2   weeks with any results.  - Continue antibiotics as taking for at least the next 5 days after stone   extraction and stent placement.  - Continue to monitor output from RUQ drain as per surgical follow up.  - [facility] patient to [facility] for [facility] care.   - Resume previous diet.   - Resume Plavix (clopidogrel) at your prior dose in 3-5 days.   - Repeat ERCP in 6 weeks to remove stent.   - Return to referring physician.   - Watch for pancreatitis, bleeding, perforation, and cholangitis.  For questions, problems or results please call your physician - Eric Hunter MD at Work:  [phone].  [facility], EMERGENCY ROOM PHONE NUMBER: [phone]  IF A COMPLICATION OR EMERGENCY SITUATION ARISES AND YOU ARE UNABLE TO REACH   YOUR PHYSICIAN - GO DIRECTLY TO THE EMERGENCY ROOM.  Eric Hunter MD  8/26/2024 12:12:17 PM  This report has been verified and signed electronically.  Dear patient,  As a result of recent federal legislation (The Federal Cures Act), you may   receive lab or pathology results from your procedure in your [facility]   account before your physician is able to contact you. Your physician or   their representative will relay the results to you with their   recommendations at their soonest availability.  Thank you,  PROVATION

## 2024-08-26 NOTE — PROGRESS NOTES
[facility] - Neurosurgery ([facility])  [facility] Medicine  Progress Note    Patient Name: Adam Persaud  MRN: [id number]  Patient Class: IP- Inpatient   Admission Date: 8/24/2024  Length of Stay: 2 days  Attending Physician: Ahmet Mahoney MD  Primary Care Provider: Liv Dai MD        Subjective:     Principal Problem:Calculus of gallbladder with acute cholecystitis without obstruction        HPI:  63M with PMH of CAD s/p CABG, chronic back pain, gout, HTN, HLD, endovascular aortic aneurysm repair (July 2024) admitted to [facility] on 8/16 for acute cholecystitis which was confirmed with imaging. General surgery performed cholecystectomy with MONIQUE drain placement on 8/19, laparoscopic cholecystectomy was converted to an open cholecystectomy as procedure was complicated by gallbladder necrosis and adherence to bowels with approximately 2100 mL estimated blood loss. He was monitored in the ICU post-operatively, but has subsequently stepped down to a floor bed. Abdominal pain persisted postoperatively requiring PCA. General Surgery noted MONIQUE drain had bilious drainage on 8/21. Gastroenterology evaluated patient for concern of bile duct leak. HIDA scan on 8/23 had concern for bile leak into the peritoneal cavity/MONIQUE drain. He underwent ERCP on 8/23 but unable to cannulate the bile duct. Referring team spoke with AES at [facility] and patient subsequently transferred for AES evaluation. Patient currently HDS and pain well controlled off PCA. Labwork today: Sodium 139, potassium 3.7, chloride 103, CO2 28, BUN 7, creatinine 0.65, glucose 96, total bilirubin 1, AST 24, ALT 15, white blood cells 6.43, hemoglobin 10.4, hematocrit 31.7, platelets 331. He will be admitted to hospital medicine service for further management.     Overview/Hospital Course:  No notes on file    Interval History: NAEON. s/p ERCP today which identified choledocholithiasis with distal CBD stone/sludge, an obvious bile leak was not observed. One temp  stent placed into CBD and one temp stent placed into pancreatic duct.  MONIQUE drain with bilious output. Pain well controlled. LFTs WNL.     Review of Systems   Constitutional:  Positive for activity change. Negative for appetite change, chills, diaphoresis, fatigue, fever and unexpected weight change.   Respiratory:  Negative for cough, chest tightness, shortness of breath, wheezing and stridor.    Cardiovascular:  Negative for chest pain, palpitations and leg swelling.   Gastrointestinal:  Positive for abdominal distention and abdominal pain. Negative for blood in stool, constipation, diarrhea, nausea and vomiting.   Endocrine: Negative for cold intolerance and heat intolerance.   Genitourinary:  Negative for difficulty urinating, dysuria, flank pain and frequency.   Musculoskeletal:  Positive for back pain and gait problem. Negative for arthralgias, joint swelling and myalgias.   Skin: Negative.    Neurological:  Negative for dizziness, weakness, light-headedness and headaches.     Objective:     Vital Signs (Most Recent):  Temp: 98.5 °F (36.9 °C) (08/26/24 1459)  Pulse: (!) 55 (08/26/24 1525)  Resp: 16 (08/26/24 1459)  BP: 135/83 (08/26/24 1459)  SpO2: 96 % (08/26/24 1459) Vital Signs (24h Range):  Temp:  [96.8 °F (36 °C)-98.6 °F (37 °C)] 98.5 °F (36.9 °C)  Pulse:  [51-86] 55  Resp:  [12-20] 16  SpO2:  [95 %-100 %] 96 %  BP: (101-163)/(62-83) 135/83     Weight: 80.7 kg (177 lb 14.6 oz)  Body mass index is 27.05 kg/m².    Intake/Output Summary (Last 24 hours) at 8/26/2024 1551  Last data filed at 8/26/2024 1508  Gross per 24 hour   Intake 2694.82 ml   Output 2770 ml   Net -75.18 ml         Physical Exam  Constitutional:       General: He is not in acute distress.     Appearance: He is ill-appearing. He is not toxic-appearing.   HENT:      Head: Normocephalic and atraumatic.   Eyes:      Conjunctiva/sclera: Conjunctivae normal.      Pupils: Pupils are equal, round, and reactive to light.   Cardiovascular:      Rate  and Rhythm: Normal rate and regular rhythm.      Heart sounds: Normal heart sounds.   Pulmonary:      Effort: Pulmonary effort is normal. No respiratory distress.      Breath sounds: Normal breath sounds. No wheezing or rales.   Abdominal:      General: Bowel sounds are normal. There is distension.      Palpations: Abdomen is soft.      Tenderness: There is abdominal tenderness.      Comments: MONIQUE drain with bilious output. Abdominal incisions with dressing in place CDI   Musculoskeletal:         General: Normal range of motion.      Cervical back: Normal range of motion and neck supple.      Right lower leg: No edema.      Left lower leg: No edema.   Skin:     General: Skin is warm and dry.   Neurological:      General: No focal deficit present.      Mental Status: He is alert and oriented to person, place, and time. Mental status is at baseline.   Psychiatric:         Mood and Affect: Mood normal.         Behavior: Behavior normal.             Significant Labs: All pertinent labs within the past 24 hours have been reviewed.    Significant Imaging: I have reviewed all pertinent imaging results/findings within the past 24 hours.    Assessment/Plan:      * Calculus of gallbladder with acute cholecystitis without obstruction  S/p open noe 8/19, complicated by necrosis and adherence to bowel wall with 2100cc blood loss  HIDA 8/23 concerning for bile leak/CBD obstruction   ERCP 8/23, unable to cannulate the bile duct  Transferred to [facility] [place] for AES evaluation  Continue Zosyn   AES consulted  Pain control   Continue to hold asa/plavix   s/p ERCP today which identified choledocholithiasis with distal CBD stone/sludge, an obvious bile leak was not observed. One temp stent placed into CBD and one temp stent placed into pancreatic duct  Cont zosyn while inpatient, can transition to cipro on discharge to complete 5 day course.  Continue to monitor output from RUQ drain. Consult gen surgery for  assistance/recs.  Resume Plavix in 3-5 days.   Repeat ERCP in 6 weeks to remove CBD stent. The pancreatic stent should pass spontaneously.  Monitor for pancreatitis, bleeding, perforation, and cholangitis     Diarrhea  Stop stool softeners   Improved      History of coronary artery bypass graft        Pain        S/P AAA (abdominal aortic aneurysm) repair  Known AAA atherosclerotic arterial disease  8/16 CTA aorto bi-iliac stent appears in satisfactory position with no stenosis or extravasation of the stent, no stenosis  Continue beta blocker and BP control       ACP (advance care planning)  Full code      BPH (benign prostatic hyperplasia)  Cont tamsulosin      Gout  Cont allopurinol      Prediabetes  Hemoglobin A1c of 5.7        Insomnia        CAD (coronary artery disease)  ASA/plavix held for procedure    Lumbar spondylosis  Pain control      HLD (hyperlipidemia)  Cont statin      HTN (hypertension)  Chronic, controlled. Latest blood pressure and vitals reviewed-     Temp:  [96.8 °F (36 °C)-98.6 °F (37 °C)]   Pulse:  [51-86]   Resp:  [12-20]   BP: (101-163)/(62-83)   SpO2:  [95 %-100 %] .   Home meds for hypertension were reviewed and noted below.   Hypertension Medications               amLODIPine (NORVASC) 10 MG tablet Take 10 mg by mouth once daily.    losartan (COZAAR) 100 MG tablet Take 100 mg by mouth once daily.    metoprolol succinate (TOPROL-XL) 25 MG 24 hr tablet Take 25 mg by mouth every evening.    nitroGLYCERIN (NITROSTAT) 0.4 MG SL tablet Place 0.4 mg under the tongue every 5 (five) minutes as needed for Chest pain.            While in the hospital, will manage blood pressure as follows; Continue home antihypertensive regimen    Will utilize p.r.n. blood pressure medication only if patient's blood pressure greater than 180/110 and he develops symptoms such as worsening chest pain or shortness of breath.      VTE Risk Mitigation (From admission, onward)           Ordered     Reason for No  Pharmacological VTE Prophylaxis  Once        Question:  Reasons:  Answer:  Risk of Bleeding    08/24/24 1525     IP VTE HIGH RISK PATIENT  Once         08/24/24 1525     Place sequential compression device  Until discontinued         08/24/24 1525                    Discharge Planning   TIM: 8/27/2024     Code Status: Full Code   Is the patient medically ready for discharge?:     Reason for patient still in hospital (select all that apply): Treatment  Discharge Plan A: Home                  Ahmet Mahoney MD  Department of Hospital Medicine   [facility] - Neurosurgery ([facility])

## 2024-08-26 NOTE — PLAN OF CARE
Problem: Adult Inpatient Plan of Care  Goal: Plan of Care Review  Outcome: Progressing  Goal: Patient-Specific Goal (Individualized)  Outcome: Progressing  Goal: Absence of Hospital-Acquired Illness or Injury  Outcome: Progressing  Goal: Optimal Comfort and Wellbeing  Outcome: Progressing  Goal: Readiness for Transition of Care  Outcome: Progressing     Problem: Wound  Goal: Optimal Coping  Outcome: Progressing  Goal: Optimal Functional Ability  Outcome: Progressing  Goal: Absence of Infection Signs and Symptoms  Outcome: Progressing  Goal: Improved Oral Intake  Outcome: Progressing  Goal: Optimal Pain Control and Function  Outcome: Progressing  Goal: Skin Health and Integrity  Outcome: Progressing  Goal: Optimal Wound Healing  Outcome: Progressing     Problem: Fall Injury Risk  Goal: Absence of Fall and Fall-Related Injury  Outcome: Progressing     Problem: Anxiety  Goal: Anxiety Reduction or Resolution  Outcome: Progressing     Problem: Pain Acute  Goal: Optimal Pain Control and Function  Outcome: Progressing  POC reviewed and updated with the patient at the bedside. Questions regarding POC were encouraged and addressed. VSS, see flowsheets.  NAEON.  Patient is AOx 4 at this time. Fall and safety precautions maintained, no signs of injury noted during shift. 1 [place] to full suction in place, see flowsheets for output. Pain management utilizing PRN pain medications effective, see MAR for administration details. Upon exiting room, patient's bed locked in low position, side rails up x 3, bed alarm set, with call light within reach. Instructed patient to call staff for mobility, verbalized understanding.  No acute signs of distress noted at this time.

## 2024-08-26 NOTE — NURSING TRANSFER
Nursing Transfer Note      8/26/2024   1:57 PM    Transfer To: [id number]    Transfer via stretcher    Transfer with cardiac monitoring    Transported by PCT    Transfer Vital Signs: see flowsheet     Telemetry: Box Number [id number], Rate 59, and Rhythm SB  Order for Tele Monitor? Yes    4eyes on Skin: yes    Medicines sent: IVF, Zosyn gtt    Chart send with patient: Yes    Notified: daughter    Patient reassessed at: 1330

## 2024-08-26 NOTE — SUBJECTIVE & OBJECTIVE
Interval History: NAEON. s/p ERCP today which identified choledocholithiasis with distal CBD stone/sludge, an obvious bile leak was not observed. One temp stent placed into CBD and one temp stent placed into pancreatic duct.  MONIQUE drain with bilious output. Pain well controlled. LFTs WNL.     Review of Systems   Constitutional:  Positive for activity change. Negative for appetite change, chills, diaphoresis, fatigue, fever and unexpected weight change.   Respiratory:  Negative for cough, chest tightness, shortness of breath, wheezing and stridor.    Cardiovascular:  Negative for chest pain, palpitations and leg swelling.   Gastrointestinal:  Positive for abdominal distention and abdominal pain. Negative for blood in stool, constipation, diarrhea, nausea and vomiting.   Endocrine: Negative for cold intolerance and heat intolerance.   Genitourinary:  Negative for difficulty urinating, dysuria, flank pain and frequency.   Musculoskeletal:  Positive for back pain and gait problem. Negative for arthralgias, joint swelling and myalgias.   Skin: Negative.    Neurological:  Negative for dizziness, weakness, light-headedness and headaches.     Objective:     Vital Signs (Most Recent):  Temp: 98.5 °F (36.9 °C) (08/26/24 1459)  Pulse: (!) 55 (08/26/24 1525)  Resp: 16 (08/26/24 1459)  BP: 135/83 (08/26/24 1459)  SpO2: 96 % (08/26/24 1459) Vital Signs (24h Range):  Temp:  [96.8 °F (36 °C)-98.6 °F (37 °C)] 98.5 °F (36.9 °C)  Pulse:  [51-86] 55  Resp:  [12-20] 16  SpO2:  [95 %-100 %] 96 %  BP: (101-163)/(62-83) 135/83     Weight: 80.7 kg (177 lb 14.6 oz)  Body mass index is 27.05 kg/m².    Intake/Output Summary (Last 24 hours) at 8/26/2024 1551  Last data filed at 8/26/2024 1508  Gross per 24 hour   Intake 2694.82 ml   Output 2770 ml   Net -75.18 ml         Physical Exam  Constitutional:       General: He is not in acute distress.     Appearance: He is ill-appearing. He is not toxic-appearing.   HENT:      Head: Normocephalic and  atraumatic.   Eyes:      Conjunctiva/sclera: Conjunctivae normal.      Pupils: Pupils are equal, round, and reactive to light.   Cardiovascular:      Rate and Rhythm: Normal rate and regular rhythm.      Heart sounds: Normal heart sounds.   Pulmonary:      Effort: Pulmonary effort is normal. No respiratory distress.      Breath sounds: Normal breath sounds. No wheezing or rales.   Abdominal:      General: Bowel sounds are normal. There is distension.      Palpations: Abdomen is soft.      Tenderness: There is abdominal tenderness.      Comments: MONIQUE drain with bilious output. Abdominal incisions with dressing in place CDI   Musculoskeletal:         General: Normal range of motion.      Cervical back: Normal range of motion and neck supple.      Right lower leg: No edema.      Left lower leg: No edema.   Skin:     General: Skin is warm and dry.   Neurological:      General: No focal deficit present.      Mental Status: He is alert and oriented to person, place, and time. Mental status is at baseline.   Psychiatric:         Mood and Affect: Mood normal.         Behavior: Behavior normal.             Significant Labs: All pertinent labs within the past 24 hours have been reviewed.    Significant Imaging: I have reviewed all pertinent imaging results/findings within the past 24 hours.

## 2024-08-26 NOTE — HOSPITAL COURSE
63M with PMH of CAD s/p CABG, chronic back pain, gout, HTN, HLD, endovascular aortic aneurysm repair (July 2024) admitted to [facility] on 8/16 for acute cholecystitis. S/p open noe 8/19 at [facility], complicated by necrosis and adherence to bowel wall with 2100cc blood loss. HIDA 8/23 concerning for bile leak/CBD obstruction. ERCP 8/23, unable to cannulate the bile duct. Transferred to [facility] for AES evaluation. s/p ERCP which identified choledocholithiasis with distal CBD stone/sludge, an obvious bile leak was not observed. One temp stent placed into CBD and one temp stent placed into pancreatic duct. Cont zosyn while inpatient, can transition to cipro on discharge to complete 5 day course. Continue to monitor output from RUQ drain. Consult gen surgery for assistance/recs, recommend discharge with drain and f/u outpt. Patient still requiring IV pain meds. Requesting transfer back to [facility] to be closer to family/home, transfer center notified. Accepted back to [facility], stable for transfer for continued care.

## 2024-08-26 NOTE — PLAN OF CARE
Problem: Adult Inpatient Plan of Care  Goal: Readiness for Transition of Care  8/26/2024 1012 by Starr Zafar RN  Outcome: Progressing     Problem: Wound  Goal: Optimal Pain Control and Function  Outcome: Progressing

## 2024-08-26 NOTE — PLAN OF CARE
Problem: Adult Inpatient Plan of Care  Goal: Readiness for Transition of Care  Outcome: Progressing     Problem: Adult Inpatient Plan of Care  Goal: Optimal Comfort and Wellbeing  Outcome: Progressing  Plan of care discussed, addressed questions and concerns. NPO MN, PRN for pain. Drain intact

## 2024-08-26 NOTE — ASSESSMENT & PLAN NOTE
S/p open noe 8/19, complicated by necrosis and adherence to bowel wall with 2100cc blood loss  HIDA 8/23 concerning for bile leak/CBD obstruction   ERCP 8/23, unable to cannulate the bile duct  Transferred to [facility] for AES evaluation  Continue Zosyn   AES consulted  Pain control   Continue to hold asa/plavix   s/p ERCP today which identified choledocholithiasis with distal CBD stone/sludge, an obvious bile leak was not observed. One temp stent placed into CBD and one temp stent placed into pancreatic duct  Cont zosyn while inpatient, can transition to cipro on discharge to complete 5 day course.  Continue to monitor output from RUQ drain. Consult gen surgery for assistance/recs.  Resume Plavix in 3-5 days.   Repeat ERCP in 6 weeks to remove CBD stent. The pancreatic stent should pass spontaneously.  Monitor for pancreatitis, bleeding, perforation, and cholangitis

## 2024-08-26 NOTE — PLAN OF CARE
[facility] - Neurosurgery (Hospital)  Initial Discharge Assessment       Primary Care Provider: Liv Dai MD    Admission Diagnosis: Calculus of gallbladder and bile duct w/cholecystitis w/o obstruction [K80.60]    Admission Date: 8/24/2024  Expected Discharge Date: 8/27/2024    Transition of Care Barriers: None    Payor: [email or web address] [facility] / Plan: [employer] / Product Type: Medicare Advantage /     Extended Emergency Contact Information  Primary Emergency Contact: Mima Garcia   [facility]  Home Phone: [phone]  Mobile Phone: [phone]  Relation: Daughter    Discharge Plan A: Home  Discharge Plan B: Home with family      [id number] - [place], [place] - [address] [facility] [address]  [facility] [address]  Phone: [phone] Fax: [phone]    [facility] #[id number] - [facility] [address] [place] [address] AT [facility] [address]   [address] [facility] [address]  [facility] [address]  Phone: [phone] Fax: [phone]      Initial Assessment (most recent)       Adult Discharge Assessment - 08/26/24 1459          Discharge Assessment    Assessment Type Discharge Planning Assessment     Confirmed/corrected address, phone number and insurance Yes     Confirmed Demographics Correct on Facesheet     Source of Information patient     Does patient/caregiver understand observation status No     Communicated TIM with patient/caregiver Yes     Reason For Admission cakculus of bladder     People in Home alone     Do you expect to return to your current living situation? Yes     Prior to hospitilization cognitive status: Alert/Oriented     Current cognitive status: Alert/Oriented     Walking or Climbing Stairs Difficulty no     Dressing/Bathing Difficulty no     Home Layout Able to live on 1st floor     Equipment Currently Used at Home none     Readmission within 30 days? No     Patient currently being followed by outpatient case management? No     Do you  currently have service(s) that help you manage your care at home? No     Do you take prescription medications? Yes     Do you have prescription coverage? Yes     Coverage PHN     Do you have any problems affording any of your prescribed medications? No     Is the patient taking medications as prescribed? yes     Who is going to help you get home at discharge? daughterJoan Garcia     How do you get to doctors appointments? car, drives self     Are you on dialysis? No     Do you take coumadin? No     Discharge Plan A Home     Discharge Plan B Home with family     DME Needed Upon Discharge  none     Discharge Plan discussed with: Patient     Transition of Care Barriers None        Physical Activity    On average, how many days per week do you engage in moderate to strenuous exercise (like a brisk walk)? 0 days     On average, how many minutes do you engage in exercise at this level? 0 min        Financial Resource Strain    How hard is it for you to pay for the very basics like food, housing, medical care, and heating? Not very hard        Housing Stability    In the last 12 months, was there a time when you were not able to pay the mortgage or rent on time? No     At any time in the past 12 months, were you homeless or living in a shelter (including now)? No        Transportation Needs    Has the lack of transportation kept you from medical appointments, meetings, work or from getting things needed for daily living? No        Food Insecurity    Within the past 12 months, you worried that your food would run out before you got the money to buy more. Never true        Stress    Do you feel stress - tense, restless, nervous, or anxious, or unable to sleep at night because your mind is troubled all the time - these days? Not at all        Social Isolation    How often do you feel lonely or isolated from those around you?  Rarely        Alcohol Use    Q1: How often do you have a drink containing alcohol? Monthly or  less     Q2: How many drinks containing alcohol do you have on a typical day when you are drinking? 1 or 2     Q3: How often do you have six or more drinks on one occasion? Never        Utilities    In the past 12 months has the electric, gas, oil, or water company threatened to shut off services in your home? No        Health Literacy    How often do you need to have someone help you when you read instructions, pamphlets, or other written material from your doctor or pharmacy? Rarely                   Patient  lives at home alone. Post hospital  stay his daughter, Mima Garcia will be pt support person. There have been no hospitalizations within the last 30 days per pt and chart review Verified pt PCP and preferred pharmacy. Pt stated he is not on Coumadin and is not receiving dialysis. All questions answered regarding case management/ discharge planning , pt verbalized understanding.    Discharge Plan A and Plan B have been determined by review of patient's clinical status, future medical and therapeutic needs, and coverage/benefits for post-acute care in coordination with multidisciplinary team members.

## 2024-08-26 NOTE — TRANSFER OF CARE
"Anesthesia Transfer of Care Note    Patient: Adam Persaud    Procedure(s) Performed: Procedure(s) (LRB):  ERCP (ENDOSCOPIC RETROGRADE CHOLANGIOPANCREATOGRAPHY) (N/A)    Patient location: PACU    Anesthesia Type: general    Transport from OR: Transported from OR on 2-3 L/min O2 by NC with adequate spontaneous ventilation    Post pain: adequate analgesia    Post assessment: no apparent anesthetic complications and tolerated procedure well    Post vital signs: stable    Level of consciousness: sedated    Nausea/Vomiting: no nausea/vomiting    Complications: none    Transfer of care protocol was followed      Last vitals: Visit Vitals  /65 (Patient Position: Lying)   Pulse 86   Temp 36.5 °C (97.7 °F) (Temporal)   Resp 16   Ht 5' 8" (1.727 m)   Wt 80.7 kg (177 lb 14.6 oz)   SpO2 96%   BMI 27.05 kg/m²     "

## 2024-08-27 PROBLEM — K80.66 CALCULUS OF GALLBLADDER AND BILE DUCT WITH ACUTE ON CHRONIC CHOLECYSTITIS WITHOUT OBSTRUCTION: Status: ACTIVE | Noted: 2024-08-27

## 2024-08-27 PROBLEM — K83.9 BILE LEAK: Status: ACTIVE | Noted: 2024-08-27

## 2024-08-27 LAB
ALBUMIN SERPL BCP-MCNC: 2.6 G/DL (ref 3.5–5.2)
ALP SERPL-CCNC: 127 U/L (ref 55–135)
ALT SERPL W/O P-5'-P-CCNC: 11 U/L (ref 10–44)
ANION GAP SERPL CALC-SCNC: 10 MMOL/L (ref 8–16)
AST SERPL-CCNC: 16 U/L (ref 10–40)
BASOPHILS # BLD AUTO: 0.04 K/UL (ref 0–0.2)
BASOPHILS NFR BLD: 0.6 % (ref 0–1.9)
BILIRUB SERPL-MCNC: 0.5 MG/DL (ref 0.1–1)
BUN SERPL-MCNC: 5 MG/DL (ref 8–23)
CALCIUM SERPL-MCNC: 9 MG/DL (ref 8.7–10.5)
CHLORIDE SERPL-SCNC: 109 MMOL/L (ref 95–110)
CO2 SERPL-SCNC: 23 MMOL/L (ref 23–29)
CREAT SERPL-MCNC: 0.7 MG/DL (ref 0.5–1.4)
DIFFERENTIAL METHOD BLD: ABNORMAL
EOSINOPHIL # BLD AUTO: 0.1 K/UL (ref 0–0.5)
EOSINOPHIL NFR BLD: 1.8 % (ref 0–8)
ERYTHROCYTE [DISTWIDTH] IN BLOOD BY AUTOMATED COUNT: 14.8 % (ref 11.5–14.5)
EST. GFR  (NO RACE VARIABLE): >60 ML/MIN/1.73 M^2
GLUCOSE SERPL-MCNC: 88 MG/DL (ref 70–110)
HCT VFR BLD AUTO: 36.4 % (ref 40–54)
HGB BLD-MCNC: 11.9 G/DL (ref 14–18)
IMM GRANULOCYTES # BLD AUTO: 0.02 K/UL (ref 0–0.04)
IMM GRANULOCYTES NFR BLD AUTO: 0.3 % (ref 0–0.5)
LYMPHOCYTES # BLD AUTO: 2 K/UL (ref 1–4.8)
LYMPHOCYTES NFR BLD: 29 % (ref 18–48)
MAGNESIUM SERPL-MCNC: 1.9 MG/DL (ref 1.6–2.6)
MCH RBC QN AUTO: 26.7 PG (ref 27–31)
MCHC RBC AUTO-ENTMCNC: 32.7 G/DL (ref 32–36)
MCV RBC AUTO: 82 FL (ref 82–98)
MONOCYTES # BLD AUTO: 0.6 K/UL (ref 0.3–1)
MONOCYTES NFR BLD: 9.4 % (ref 4–15)
NEUTROPHILS # BLD AUTO: 4 K/UL (ref 1.8–7.7)
NEUTROPHILS NFR BLD: 58.9 % (ref 38–73)
NRBC BLD-RTO: 0 /100 WBC
PHOSPHATE SERPL-MCNC: 3.4 MG/DL (ref 2.7–4.5)
PLATELET # BLD AUTO: 390 K/UL (ref 150–450)
PMV BLD AUTO: 11.1 FL (ref 9.2–12.9)
POTASSIUM SERPL-SCNC: 3.8 MMOL/L (ref 3.5–5.1)
PROT SERPL-MCNC: 6.5 G/DL (ref 6–8.4)
RBC # BLD AUTO: 4.45 M/UL (ref 4.6–6.2)
SODIUM SERPL-SCNC: 142 MMOL/L (ref 136–145)
WBC # BLD AUTO: 6.73 K/UL (ref 3.9–12.7)

## 2024-08-27 PROCEDURE — 80053 COMPREHEN METABOLIC PANEL: CPT | Performed by: INTERNAL MEDICINE

## 2024-08-27 PROCEDURE — 63600175 PHARM REV CODE 636 W HCPCS: Performed by: INTERNAL MEDICINE

## 2024-08-27 PROCEDURE — 85025 COMPLETE CBC W/AUTO DIFF WBC: CPT | Performed by: INTERNAL MEDICINE

## 2024-08-27 PROCEDURE — 25000003 PHARM REV CODE 250: Performed by: INTERNAL MEDICINE

## 2024-08-27 PROCEDURE — 63600175 PHARM REV CODE 636 W HCPCS: Performed by: STUDENT IN AN ORGANIZED HEALTH CARE EDUCATION/TRAINING PROGRAM

## 2024-08-27 PROCEDURE — 36415 COLL VENOUS BLD VENIPUNCTURE: CPT | Performed by: INTERNAL MEDICINE

## 2024-08-27 PROCEDURE — 84100 ASSAY OF PHOSPHORUS: CPT | Performed by: INTERNAL MEDICINE

## 2024-08-27 PROCEDURE — 83735 ASSAY OF MAGNESIUM: CPT | Performed by: INTERNAL MEDICINE

## 2024-08-27 PROCEDURE — 11000001 HC ACUTE MED/SURG PRIVATE ROOM

## 2024-08-27 PROCEDURE — 25000003 PHARM REV CODE 250: Performed by: STUDENT IN AN ORGANIZED HEALTH CARE EDUCATION/TRAINING PROGRAM

## 2024-08-27 RX ORDER — HYDROMORPHONE HYDROCHLORIDE 1 MG/ML
1 INJECTION, SOLUTION INTRAMUSCULAR; INTRAVENOUS; SUBCUTANEOUS EVERY 6 HOURS PRN
Status: DISCONTINUED | OUTPATIENT
Start: 2024-08-27 | End: 2024-08-28

## 2024-08-27 RX ORDER — HYDROCODONE BITARTRATE AND ACETAMINOPHEN 10; 325 MG/1; MG/1
1 TABLET ORAL EVERY 6 HOURS PRN
Status: DISCONTINUED | OUTPATIENT
Start: 2024-08-27 | End: 2024-08-28

## 2024-08-27 RX ORDER — ACETAMINOPHEN 325 MG/1
650 TABLET ORAL EVERY 8 HOURS PRN
Status: DISCONTINUED | OUTPATIENT
Start: 2024-08-27 | End: 2024-08-30 | Stop reason: HOSPADM

## 2024-08-27 RX ADMIN — HYDROCODONE BITARTRATE AND ACETAMINOPHEN 1 TABLET: 10; 325 TABLET ORAL at 02:08

## 2024-08-27 RX ADMIN — METOPROLOL SUCCINATE 25 MG: 25 TABLET, EXTENDED RELEASE ORAL at 08:08

## 2024-08-27 RX ADMIN — PANTOPRAZOLE SODIUM 40 MG: 20 TABLET, DELAYED RELEASE ORAL at 08:08

## 2024-08-27 RX ADMIN — OXYCODONE HYDROCHLORIDE 15 MG: 5 TABLET ORAL at 09:08

## 2024-08-27 RX ADMIN — OXYCODONE HYDROCHLORIDE 15 MG: 5 TABLET ORAL at 05:08

## 2024-08-27 RX ADMIN — AMLODIPINE BESYLATE 10 MG: 10 TABLET ORAL at 08:08

## 2024-08-27 RX ADMIN — ALLOPURINOL 300 MG: 100 TABLET ORAL at 08:08

## 2024-08-27 RX ADMIN — PIPERACILLIN SODIUM AND TAZOBACTAM SODIUM 4.5 G: 4; .5 INJECTION, POWDER, FOR SOLUTION INTRAVENOUS at 02:08

## 2024-08-27 RX ADMIN — MIRTAZAPINE 30 MG: 7.5 TABLET, FILM COATED ORAL at 08:08

## 2024-08-27 RX ADMIN — TAMSULOSIN HYDROCHLORIDE 0.8 MG: 0.4 CAPSULE ORAL at 08:08

## 2024-08-27 RX ADMIN — GABAPENTIN 800 MG: 400 CAPSULE ORAL at 08:08

## 2024-08-27 RX ADMIN — PIPERACILLIN SODIUM AND TAZOBACTAM SODIUM 4.5 G: 4; .5 INJECTION, POWDER, FOR SOLUTION INTRAVENOUS at 08:08

## 2024-08-27 RX ADMIN — HYDROCODONE BITARTRATE AND ACETAMINOPHEN 1 TABLET: 10; 325 TABLET ORAL at 08:08

## 2024-08-27 RX ADMIN — HYDROMORPHONE HYDROCHLORIDE 1 MG: 1 INJECTION, SOLUTION INTRAMUSCULAR; INTRAVENOUS; SUBCUTANEOUS at 01:08

## 2024-08-27 RX ADMIN — Medication 6 MG: at 12:08

## 2024-08-27 RX ADMIN — LOSARTAN POTASSIUM 100 MG: 50 TABLET, FILM COATED ORAL at 08:08

## 2024-08-27 RX ADMIN — HYDROMORPHONE HYDROCHLORIDE 1 MG: 1 INJECTION, SOLUTION INTRAMUSCULAR; INTRAVENOUS; SUBCUTANEOUS at 07:08

## 2024-08-27 RX ADMIN — ACETAMINOPHEN 1000 MG: 325 TABLET ORAL at 07:08

## 2024-08-27 RX ADMIN — TRAZODONE HYDROCHLORIDE 50 MG: 50 TABLET ORAL at 08:08

## 2024-08-27 RX ADMIN — HYDROMORPHONE HYDROCHLORIDE 1 MG: 1 INJECTION, SOLUTION INTRAMUSCULAR; INTRAVENOUS; SUBCUTANEOUS at 03:08

## 2024-08-27 RX ADMIN — HYDROMORPHONE HYDROCHLORIDE 1 MG: 1 INJECTION, SOLUTION INTRAMUSCULAR; INTRAVENOUS; SUBCUTANEOUS at 10:08

## 2024-08-27 RX ADMIN — PIPERACILLIN SODIUM AND TAZOBACTAM SODIUM 4.5 G: 4; .5 INJECTION, POWDER, FOR SOLUTION INTRAVENOUS at 05:08

## 2024-08-27 RX ADMIN — ATORVASTATIN CALCIUM 80 MG: 40 TABLET, FILM COATED ORAL at 08:08

## 2024-08-27 NOTE — NURSING
Nurses Note -- 4 Eyes        Skin assessed during: shift        [] No Altered Skin Integrity Present                 [x]Prevention Measures Documented        [x] Yes- Altered Skin Integrity Present on admit/transfer              [x] LDA Added if Not in Epic (Describe Wound)              [x] New Altered Skin Integrity was Present on Admit and Documented in LDA              [] Wound Image Taken     Wound Care Consulted? No Surgical Wound      Attending Nurse:  RYNE Jackson      Second RN/Staff Member: RYNE Watson

## 2024-08-27 NOTE — HPI
Adam Persaud is a 63 y.o. gentleman with history of CAD s/p CABG on asa and plavix, chronic back pain, gout, HTN, and endovascular aortic aneurysm repair (July 2024) who underwent a lap noe on 8/19. The cholecystectomy was complicated and converted to open. Postoperatively, the drain output became bilious. A HIDA scan confirmed a biliary leak. An ERCP was attempted but unsuccessful so he was transferred to [facility] for AES evaluation. He ha since undergone an ERCP on 8/26 with a stent placed. Interestingly, no leak was seen during the ERCP. General surgery consulted for evaluation.

## 2024-08-27 NOTE — SUBJECTIVE & OBJECTIVE
Interval History: NAEON. s/p ERCP 8/26 which identified choledocholithiasis with distal CBD stone/sludge, an obvious bile leak was not observed. One temp stent placed into CBD and one temp stent placed into pancreatic duct.  MONIQUE drain with bilious output. Pain well controlled. LFTs WNL.     Awaiting surgery assessment. Wants to know when he can go home.     Review of Systems   Constitutional:  Positive for activity change. Negative for appetite change, chills, diaphoresis, fatigue, fever and unexpected weight change.   Respiratory:  Negative for cough, chest tightness, shortness of breath, wheezing and stridor.    Cardiovascular:  Negative for chest pain, palpitations and leg swelling.   Gastrointestinal:  Positive for abdominal distention and abdominal pain. Negative for blood in stool, constipation, diarrhea, nausea and vomiting.   Endocrine: Negative for cold intolerance and heat intolerance.   Genitourinary:  Negative for difficulty urinating, dysuria, flank pain and frequency.   Musculoskeletal:  Positive for back pain and gait problem. Negative for arthralgias, joint swelling and myalgias.   Skin: Negative.    Neurological:  Negative for dizziness, weakness, light-headedness and headaches.     Objective:     Vital Signs (Most Recent):  Temp: 97.8 °F (36.6 °C) (08/27/24 1057)  Pulse: 78 (08/27/24 1118)  Resp: 18 (08/27/24 1057)  BP: 129/73 (08/27/24 1057)  SpO2: 95 % (08/27/24 1057) Vital Signs (24h Range):  Temp:  [97.5 °F (36.4 °C)-98.5 °F (36.9 °C)] 97.8 °F (36.6 °C)  Pulse:  [46-78] 78  Resp:  [16-19] 18  SpO2:  [95 %-99 %] 95 %  BP: (129-159)/(65-83) 129/73     Weight: 80.7 kg (177 lb 14.6 oz)  Body mass index is 27.05 kg/m².    Intake/Output Summary (Last 24 hours) at 8/27/2024 1400  Last data filed at 8/27/2024 0515  Gross per 24 hour   Intake 47.22 ml   Output 20 ml   Net 27.22 ml         Physical Exam  Constitutional:       General: He is not in acute distress.     Appearance: He is ill-appearing. He is  not toxic-appearing.   HENT:      Head: Normocephalic and atraumatic.   Eyes:      Conjunctiva/sclera: Conjunctivae normal.      Pupils: Pupils are equal, round, and reactive to light.   Cardiovascular:      Rate and Rhythm: Normal rate and regular rhythm.      Heart sounds: Normal heart sounds.   Pulmonary:      Effort: Pulmonary effort is normal. No respiratory distress.      Breath sounds: Normal breath sounds. No wheezing or rales.   Abdominal:      General: Bowel sounds are normal. There is distension.      Palpations: Abdomen is soft.      Tenderness: There is abdominal tenderness.      Comments: MONIQUE drain with bilious output. Abdominal incisions with dressing in place CDI   Musculoskeletal:         General: Normal range of motion.      Cervical back: Normal range of motion and neck supple.      Right lower leg: No edema.      Left lower leg: No edema.   Skin:     General: Skin is warm and dry.   Neurological:      General: No focal deficit present.      Mental Status: He is alert and oriented to person, place, and time. Mental status is at baseline.   Psychiatric:         Mood and Affect: Mood normal.         Behavior: Behavior normal.             Significant Labs: All pertinent labs within the past 24 hours have been reviewed.    Significant Imaging: I have reviewed all pertinent imaging results/findings within the past 24 hours.

## 2024-08-27 NOTE — PROGRESS NOTES
[facility] - Neurosurgery ([facility])  [facility] Medicine  Progress Note    Patient Name: Adam Persaud  MRN: [id number]  Patient Class: IP- Inpatient   Admission Date: 8/24/2024  Length of Stay: 3 days  Attending Physician: Pablo Barnett MD  Primary Care Provider: Liv Dai MD        Subjective:     Principal Problem:Calculus of gallbladder with acute cholecystitis without obstruction        HPI:  63M with PMH of CAD s/p CABG, chronic back pain, gout, HTN, HLD, endovascular aortic aneurysm repair (July 2024) admitted to [facility] on 8/16 for acute cholecystitis which was confirmed with imaging. General surgery performed cholecystectomy with MONIQUE drain placement on 8/19, laparoscopic cholecystectomy was converted to an open cholecystectomy as procedure was complicated by gallbladder necrosis and adherence to bowels with approximately 2100 mL estimated blood loss. He was monitored in the ICU post-operatively, but has subsequently stepped down to a floor bed. Abdominal pain persisted postoperatively requiring PCA. General Surgery noted MONIQUE drain had bilious drainage on 8/21. Gastroenterology evaluated patient for concern of bile duct leak. HIDA scan on 8/23 had concern for bile leak into the peritoneal cavity/MONIQUE drain. He underwent ERCP on 8/23 but unable to cannulate the bile duct. Referring team spoke with AES at [facility] and patient subsequently transferred for AES evaluation. Patient currently HDS and pain well controlled off PCA. Labwork today: Sodium 139, potassium 3.7, chloride 103, CO2 28, BUN 7, creatinine 0.65, glucose 96, total bilirubin 1, AST 24, ALT 15, white blood cells 6.43, hemoglobin 10.4, hematocrit 31.7, platelets 331. He will be admitted to hospital medicine service for further management.     Overview/Hospital Course:  63M with PMH of CAD s/p CABG, chronic back pain, gout, HTN, HLD, endovascular aortic aneurysm repair (July 2024) admitted to [facility] on 8/16 for acute cholecystitis. S/p open noe  8/19 at [facility], complicated by necrosis and adherence to bowel wall with 2100cc blood loss. HIDA 8/23 concerning for bile leak/CBD obstruction. ERCP 8/23, unable to cannulate the bile duct. Transferred to [facility] for AES evaluation. s/p ERCP today which identified choledocholithiasis with distal CBD stone/sludge, an obvious bile leak was not observed. One temp stent placed into CBD and one temp stent placed into pancreatic duct. Cont zosyn while inpatient, can transition to cipro on discharge to complete 5 day course. Continue to monitor output from RUQ drain. Consult gen surgery for assistance/recs.    Interval History: NAEON. s/p ERCP 8/26 which identified choledocholithiasis with distal CBD stone/sludge, an obvious bile leak was not observed. One temp stent placed into CBD and one temp stent placed into pancreatic duct.  MONIQUE drain with bilious output. Pain well controlled. LFTs WNL.     Awaiting surgery assessment. Wants to know when he can go home.     Review of Systems   Constitutional:  Positive for activity change. Negative for appetite change, chills, diaphoresis, fatigue, fever and unexpected weight change.   Respiratory:  Negative for cough, chest tightness, shortness of breath, wheezing and stridor.    Cardiovascular:  Negative for chest pain, palpitations and leg swelling.   Gastrointestinal:  Positive for abdominal distention and abdominal pain. Negative for blood in stool, constipation, diarrhea, nausea and vomiting.   Endocrine: Negative for cold intolerance and heat intolerance.   Genitourinary:  Negative for difficulty urinating, dysuria, flank pain and frequency.   Musculoskeletal:  Positive for back pain and gait problem. Negative for arthralgias, joint swelling and myalgias.   Skin: Negative.    Neurological:  Negative for dizziness, weakness, light-headedness and headaches.     Objective:     Vital Signs (Most Recent):  Temp: 97.8 °F (36.6 °C) (08/27/24 1057)  Pulse: 78 (08/27/24  1118)  Resp: 18 (08/27/24 1057)  BP: 129/73 (08/27/24 1057)  SpO2: 95 % (08/27/24 1057) Vital Signs (24h Range):  Temp:  [97.5 °F (36.4 °C)-98.5 °F (36.9 °C)] 97.8 °F (36.6 °C)  Pulse:  [46-78] 78  Resp:  [16-19] 18  SpO2:  [95 %-99 %] 95 %  BP: (129-159)/(65-83) 129/73     Weight: 80.7 kg (177 lb 14.6 oz)  Body mass index is 27.05 kg/m².    Intake/Output Summary (Last 24 hours) at 8/27/2024 1400  Last data filed at 8/27/2024 0515  Gross per 24 hour   Intake 47.22 ml   Output 20 ml   Net 27.22 ml         Physical Exam  Constitutional:       General: He is not in acute distress.     Appearance: He is ill-appearing. He is not toxic-appearing.   HENT:      Head: Normocephalic and atraumatic.   Eyes:      Conjunctiva/sclera: Conjunctivae normal.      Pupils: Pupils are equal, round, and reactive to light.   Cardiovascular:      Rate and Rhythm: Normal rate and regular rhythm.      Heart sounds: Normal heart sounds.   Pulmonary:      Effort: Pulmonary effort is normal. No respiratory distress.      Breath sounds: Normal breath sounds. No wheezing or rales.   Abdominal:      General: Bowel sounds are normal. There is distension.      Palpations: Abdomen is soft.      Tenderness: There is abdominal tenderness.      Comments: MONIQUE drain with bilious output. Abdominal incisions with dressing in place CDI   Musculoskeletal:         General: Normal range of motion.      Cervical back: Normal range of motion and neck supple.      Right lower leg: No edema.      Left lower leg: No edema.   Skin:     General: Skin is warm and dry.   Neurological:      General: No focal deficit present.      Mental Status: He is alert and oriented to person, place, and time. Mental status is at baseline.   Psychiatric:         Mood and Affect: Mood normal.         Behavior: Behavior normal.             Significant Labs: All pertinent labs within the past 24 hours have been reviewed.    Significant Imaging: I have reviewed all pertinent imaging  results/findings within the past 24 hours.    Assessment/Plan:      * Calculus of gallbladder with acute cholecystitis without obstruction  S/p open noe 8/19, complicated by necrosis and adherence to bowel wall with 2100cc blood loss  HIDA 8/23 concerning for bile leak/CBD obstruction   ERCP 8/23, unable to cannulate the bile duct  Transferred to [facility] for AES evaluation  Continue Zosyn   AES consulted  Pain control   Continue to hold asa/plavix   s/p ERCP 8/26 which identified choledocholithiasis with distal CBD stone/sludge, an obvious bile leak was not observed. One temp stent placed into CBD and one temp stent placed into pancreatic duct  Cont zosyn while inpatient, will transition to cipro on discharge to complete 5 day course day 2/5 today  Continue to monitor output from RUQ drain.   -Consult gen surgery for assistance/recs - awaiting assessment  -Resume Plavix in 3 days.   -Repeat ERCP in 6 weeks to remove CBD stent. The pancreatic stent should pass spontaneously.  Monitor for pancreatitis, bleeding, perforation, and cholangitis     Diarrhea  Stop stool softeners   Improved      History of coronary artery bypass graft        Pain        S/P AAA (abdominal aortic aneurysm) repair  Known AAA atherosclerotic arterial disease  8/16 CTA aorto bi-iliac stent appears in satisfactory position with no stenosis or extravasation of the stent, no stenosis  Continue beta blocker and BP control       ACP (advance care planning)  Full code      BPH (benign prostatic hyperplasia)  Cont tamsulosin      Gout  Cont allopurinol      Prediabetes  Hemoglobin A1c of 5.7        Insomnia        CAD (coronary artery disease)  ASA/plavix held for procedure    Lumbar spondylosis  Pain control      HLD (hyperlipidemia)  Cont statin      HTN (hypertension)  Chronic, controlled. Latest blood pressure and vitals reviewed-     Temp:  [96.8 °F (36 °C)-98.6 °F (37 °C)]   Pulse:  [51-86]   Resp:  [12-20]   BP: (101-163)/(62-83)    SpO2:  [95 %-100 %] .   Home meds for hypertension were reviewed and noted below.   Hypertension Medications               amLODIPine (NORVASC) 10 MG tablet Take 10 mg by mouth once daily.    losartan (COZAAR) 100 MG tablet Take 100 mg by mouth once daily.    metoprolol succinate (TOPROL-XL) 25 MG 24 hr tablet Take 25 mg by mouth every evening.    nitroGLYCERIN (NITROSTAT) 0.4 MG SL tablet Place 0.4 mg under the tongue every 5 (five) minutes as needed for Chest pain.            While in the hospital, will manage blood pressure as follows; Continue home antihypertensive regimen    Will utilize p.r.n. blood pressure medication only if patient's blood pressure greater than 180/110 and he develops symptoms such as worsening chest pain or shortness of breath.      VTE Risk Mitigation (From admission, onward)           Ordered     Reason for No Pharmacological VTE Prophylaxis  Once        Question:  Reasons:  Answer:  Risk of Bleeding    08/24/24 1525     IP VTE HIGH RISK PATIENT  Once         08/24/24 1525                    Discharge Planning   TIM: 8/27/2024     Code Status: Full Code   Is the patient medically ready for discharge?:     Reason for patient still in hospital (select all that apply): Treatment  Discharge Plan A: Home                  Pablo Barnett MD  Department of Hospital Medicine   [facility] Neurosurgery ([facility])

## 2024-08-27 NOTE — PLAN OF CARE
Problem: Adult Inpatient Plan of Care  Goal: Plan of Care Review  Outcome: Progressing  Goal: Patient-Specific Goal (Individualized)  Outcome: Progressing  Goal: Absence of Hospital-Acquired Illness or Injury  Outcome: Progressing  Goal: Optimal Comfort and Wellbeing  Outcome: Progressing  Goal: Readiness for Transition of Care  Outcome: Progressing     Problem: Wound  Goal: Optimal Coping  Outcome: Progressing  Goal: Optimal Functional Ability  Outcome: Progressing  Goal: Absence of Infection Signs and Symptoms  Outcome: Progressing  Goal: Improved Oral Intake  Outcome: Progressing  Goal: Optimal Pain Control and Function  Outcome: Progressing  Goal: Skin Health and Integrity  Outcome: Progressing  Goal: Optimal Wound Healing  Outcome: Progressing     Problem: Fall Injury Risk  Goal: Absence of Fall and Fall-Related Injury  Outcome: Progressing     Problem: Anxiety  Goal: Anxiety Reduction or Resolution  Outcome: Progressing     Problem: Pain Acute  Goal: Optimal Pain Control and Function  Outcome: Progressing   POC reviewed and updated with the patient at the bedside. Questions regarding POC were encouraged and addressed. VSS, see flowsheets. Tele maintained per order. . NAEON.  Patient is AOx 4 at this time. Fall and safety precautions maintained, no signs of injury noted during shift. 1 J/P to full suction in place, see flowsheets for output. Pain management utilizing PRN pain medications effective, see MAR for administration details. Upon exiting room, patient's bed locked in low position, side rails up x 3, bed alarm set, with call light within reach. Instructed patient to call staff for mobility, verbalized understanding.  No acute signs of distress noted at this time.

## 2024-08-27 NOTE — CONSULTS
[facility] Neurosurgery ([facility])  General Surgery  Consult Note    Patient Name: Adam Persaud  MRN: [id number]  Code Status: Full Code  Admission Date: 8/24/2024  Hospital Length of Stay: 3 days  Attending Physician: Pablo Barnett MD  Primary Care Provider: Liv Dai MD    Patient information was obtained from patient and past medical records.     Inpatient consult to General Surgery  Consult performed by: Feliz Jin MD  Consult ordered by: Ahmet Mahoney MD        Subjective:     Principal Problem: Calculus of gallbladder with acute cholecystitis without obstruction    History of Present Illness: Adam Persaud is a 63 y.o. gentleman with history of CAD s/p CABG on asa and plavix, chronic back pain, gout, HTN, and endovascular aortic aneurysm repair (July 2024) who underwent a lap noe on 8/19. The cholecystectomy was complicated and converted to open. Postoperatively, the drain output became bilious. A HIDA scan confirmed a biliary leak. An ERCP was attempted but unsuccessful so he was [facility] [place] [facility] [place] [facility] [place]. He ha since undergone an ERCP on 8/26 with a stent placed. Interestingly, no leak was seen during the ERCP. General surgery consulted for evaluation.       No current facility-administered medications on file prior to encounter.     Current Outpatient Medications on File Prior to Encounter   Medication Sig    allopurinol (ZYLOPRIM) 300 MG tablet Take 300 mg by mouth once daily.    amLODIPine (NORVASC) 10 MG tablet Take 10 mg by mouth once daily.    aspirin 325 MG tablet Take 162.5 mg by mouth once daily.    clopidogrel (PLAVIX) 75 mg tablet Take 75 mg by mouth once daily.    D5W PgBk 100 mL with piperacillin-tazobactam 4.5 gram SolR 4.5 g Inject 4.5 g into the vein every 8 (eight) hours.    losartan (COZAAR) 100 MG tablet Take 100 mg by mouth once daily.    metoprolol succinate (TOPROL-XL) 25 MG 24 hr tablet Take 25 mg by mouth every evening.    mirtazapine (REMERON) 30 MG  tablet Take 30 mg by mouth every evening.    nitroGLYCERIN (NITROSTAT) 0.4 MG SL tablet Place 0.4 mg under the tongue every 5 (five) minutes as needed for Chest pain.    oxyCODONE-acetaminophen (PERCOCET)  mg per tablet Take 1 tablet by mouth 4 (four) times daily as needed for Pain.    pantoprazole (PROTONIX) 40 MG tablet Take 40 mg by mouth 2 (two) times daily.    REPATHA SURECLICK 140 mg/mL PnIj Inject 140 mg into the skin every 14 (fourteen) days.    rosuvastatin (CRESTOR) 40 MG Tab Take 40 mg by mouth once daily.    tamsulosin (FLOMAX) 0.4 mg Cap Take 0.8 mg by mouth every evening.    traZODone (DESYREL) 50 MG tablet Take  mg by mouth every evening.       Review of patient's allergies indicates:  No Known Allergies    Past Medical History:   Diagnosis Date    Arthritis     Coronary artery disease     stents x2 in heart    Gout     Hyperlipidemia     Hypertension      Past Surgical History:   Procedure Laterality Date    ABDOMINAL AORTIC ANEURYSM REPAIR, ENDOVASCULAR Bilateral 7/9/2024    Procedure: REPAIR-ANEURYSM-ABDOMINAL AORTIC-ENDOVASCULAR (AAA);  Surgeon: Jose Hernandez MD;  Location: [facility] OR;  Service: Vascular;  Laterality: Bilateral;    CORONARY ANGIOPLASTY WITH STENT PLACEMENT      X2    EPIDURAL STEROID INJECTION INTO CERVICAL SPINE N/A 7/10/2018    Procedure: Injection-steroid-epidural-cervical;  Surgeon: Kim Fuchs Jr., MD;  Location: [facility] OR;  Service: Pain Management;  Laterality: N/A;  to left    ERCP N/A 8/23/2024    Procedure: ERCP (ENDOSCOPIC RETROGRADE CHOLANGIOPANCREATOGRAPHY);  Surgeon: Brendon Figueroa MD;  Location: [facility] ENDO;  Service: Endoscopy;  Laterality: N/A;    ERCP N/A 8/26/2024    Procedure: ERCP (ENDOSCOPIC RETROGRADE CHOLANGIOPANCREATOGRAPHY);  Surgeon: Eric Hunter MD;  Location: [facility] ENDO [address]);  Service: Endoscopy;  Laterality: N/A;    ESOPHAGOGASTRODUODENOSCOPY N/A 3/14/2023    Procedure: EGD (ESOPHAGOGASTRODUODENOSCOPY);  Surgeon: Brendon  MD Henry;  Location: [facility];  Service: Gastroenterology;  Laterality: N/A;    index finger amputation Left     LAPAROSCOPIC CHOLECYSTECTOMY N/A 8/19/2024    Procedure: CHOLECYSTECTOMY, LAPAROSCOPIC- converted to open;  Surgeon: Cristopher Daniel MD;  Location: [facility] OR;  Service: General;  Laterality: N/A;    MANDIBLE SURGERY      wiring    RADIOFREQUENCY ABLATION OF LUMBAR MEDIAL BRANCH NERVE AT SINGLE LEVEL Bilateral 5/22/2018    Procedure: RADIOFREQUENCY THERMOCOAGULATION (RFTC)-NERVE-MEDIAN BRANCH-LUMBAR L3, L4, L5;  Surgeon: Bucky Paz MD;  Location: [facility] OR;  Service: Pain Management;  Laterality: Bilateral;    SHOULDER ARTHROSCOPY Right      Family History    None       Tobacco Use    Smoking status: Every Day     Current packs/day: 2.00     Average packs/day: 2.0 packs/day for 30.0 years (60.0 ttl pk-yrs)     Types: Cigarettes    Smokeless tobacco: Never   Substance and Sexual Activity    Alcohol use: Yes     Alcohol/week: 1.0 standard drink of alcohol     Types: 1 Cans of beer per week    Drug use: No    Sexual activity: Not on file     Review of Systems   Constitutional:  Negative for chills and fever.   HENT:  Negative for hearing loss and sore throat.    Eyes:  Negative for discharge and visual disturbance.   Respiratory:  Negative for chest tightness and shortness of breath.    Cardiovascular:  Negative for chest pain and leg swelling.   Gastrointestinal:  Positive for abdominal pain. Negative for abdominal distention, nausea and vomiting.   Genitourinary:  Negative for difficulty urinating and hematuria.   Musculoskeletal:  Positive for back pain. Negative for joint swelling.   Skin:  Negative for color change and rash.   Neurological:  Negative for numbness and headaches.     Objective:     Vital Signs (Most Recent):  Temp: 97.8 °F (36.6 °C) (08/27/24 1057)  Pulse: 78 (08/27/24 1118)  Resp: 18 (08/27/24 1057)  BP: 129/73 (08/27/24 1057)  SpO2: 95 % (08/27/24 1057) Vital Signs (24h  Range):  Temp:  [97.5 °F (36.4 °C)-98.5 °F (36.9 °C)] 97.8 °F (36.6 °C)  Pulse:  [46-78] 78  Resp:  [14-19] 18  SpO2:  [95 %-99 %] 95 %  BP: (129-159)/(65-83) 129/73     Weight: 80.7 kg (177 lb 14.6 oz)  Body mass index is 27.05 kg/m².     Physical Exam  Constitutional:       General: He is not in acute distress.     Appearance: Normal appearance.   HENT:      Head: Normocephalic and atraumatic.   Cardiovascular:      Rate and Rhythm: Normal rate and regular rhythm.   Pulmonary:      Effort: Pulmonary effort is normal. No respiratory distress.   Abdominal:      General: Abdomen is flat. There is no distension.      Palpations: Abdomen is soft.      Comments: Mild abdominal tenderness  Healing incisions with staples in place  Drain in place, bilious output   Neurological:      General: No focal deficit present.      Mental Status: He is alert and oriented to person, place, and time.   Psychiatric:         Behavior: Behavior normal.         Thought Content: Thought content normal.            I have reviewed all pertinent lab results within the past 24 hours.  CBC:   Recent Labs   Lab 08/27/24  0529   WBC 6.73   RBC 4.45*   HGB 11.9*   HCT 36.4*      MCV 82   MCH 26.7*   MCHC 32.7     CMP:   Recent Labs   Lab 08/27/24  0529   GLU 88   CALCIUM 9.0   ALBUMIN 2.6*   PROT 6.5      K 3.8   CO2 23      BUN 5*   CREATININE 0.7   ALKPHOS 127   ALT 11   AST 16   BILITOT 0.5       Significant Diagnostics:  I have reviewed all pertinent imaging results/findings within the past 24 hours.    Assessment/Plan:     * Calculus of gallbladder with acute cholecystitis without obstruction  Adam Persaud is a 63 y.o. gentleman who underwent a lap converted to open noe for acute cholecystitis on 8/19, postop course complicated by a biliary leak    - keep MONIQUE drain in place, likely will discharge from hospital with it  - multimodal pain control, has pain meds at home  - keep monitoring quality of drain output, would  not dc until the output has become serous. He reports that the output has slowed down significantly  - will keep following      VTE Risk Mitigation (From admission, onward)           Ordered     Reason for No Pharmacological VTE Prophylaxis  Once        Question:  Reasons:  Answer:  Risk of Bleeding    08/24/24 1525     IP VTE HIGH RISK PATIENT  Once         08/24/24 1525                    Thank you for your consult. I will follow-up with patient. Please contact us if you have any additional questions.    Feliz Jin MD  General Surgery  [facility] - Neurosurgery ([facility])

## 2024-08-27 NOTE — ASSESSMENT & PLAN NOTE
S/p open noe 8/19, complicated by necrosis and adherence to bowel wall with 2100cc blood loss  HIDA 8/23 concerning for bile leak/CBD obstruction   ERCP 8/23, unable to cannulate the bile duct  Transferred to [facility] for AES evaluation  Continue Zosyn   AES consulted  Pain control   Continue to hold asa/plavix   s/p ERCP 8/26 which identified choledocholithiasis with distal CBD stone/sludge, an obvious bile leak was not observed. One temp stent placed into CBD and one temp stent placed into pancreatic duct  Cont zosyn while inpatient, will transition to cipro on discharge to complete 5 day course day 2/5 today  Continue to monitor output from RUQ drain.   -Consult gen surgery for assistance/recs - awaiting assessment  -Resume Plavix in 3 days.   -Repeat ERCP in 6 weeks to remove CBD stent. The pancreatic stent should pass spontaneously.  Monitor for pancreatitis, bleeding, perforation, and cholangitis

## 2024-08-27 NOTE — ASSESSMENT & PLAN NOTE
Adam Persaud is a 63 y.o. gentleman who underwent a lap converted to open noe for acute cholecystitis on 8/19, postop course complicated by a biliary leak    - keep MONIQUE drain in place, likely will discharge from hospital with it  - multimodal pain control, has pain meds at home  - keep monitoring quality of drain output, would not dc until the output has become serous. He reports that the output has slowed down significantly  - will keep following

## 2024-08-27 NOTE — SUBJECTIVE & OBJECTIVE
No current facility-administered medications on file prior to encounter.     Current Outpatient Medications on File Prior to Encounter   Medication Sig    allopurinol (ZYLOPRIM) 300 MG tablet Take 300 mg by mouth once daily.    amLODIPine (NORVASC) 10 MG tablet Take 10 mg by mouth once daily.    aspirin 325 MG tablet Take 162.5 mg by mouth once daily.    clopidogrel (PLAVIX) 75 mg tablet Take 75 mg by mouth once daily.    D5W PgBk 100 mL with piperacillin-tazobactam 4.5 gram SolR 4.5 g Inject 4.5 g into the vein every 8 (eight) hours.    losartan (COZAAR) 100 MG tablet Take 100 mg by mouth once daily.    metoprolol succinate (TOPROL-XL) 25 MG 24 hr tablet Take 25 mg by mouth every evening.    mirtazapine (REMERON) 30 MG tablet Take 30 mg by mouth every evening.    nitroGLYCERIN (NITROSTAT) 0.4 MG SL tablet Place 0.4 mg under the tongue every 5 (five) minutes as needed for Chest pain.    oxyCODONE-acetaminophen (PERCOCET)  mg per tablet Take 1 tablet by mouth 4 (four) times daily as needed for Pain.    pantoprazole (PROTONIX) 40 MG tablet Take 40 mg by mouth 2 (two) times daily.    REPATHA SURECLICK 140 mg/mL PnIj Inject 140 mg into the skin every 14 (fourteen) days.    rosuvastatin (CRESTOR) 40 MG Tab Take 40 mg by mouth once daily.    tamsulosin (FLOMAX) 0.4 mg Cap Take 0.8 mg by mouth every evening.    traZODone (DESYREL) 50 MG tablet Take  mg by mouth every evening.       Review of patient's allergies indicates:  No Known Allergies    Past Medical History:   Diagnosis Date    Arthritis     Coronary artery disease     stents x2 in heart    Gout     Hyperlipidemia     Hypertension      Past Surgical History:   Procedure Laterality Date    ABDOMINAL AORTIC ANEURYSM REPAIR, ENDOVASCULAR Bilateral 7/9/2024    Procedure: REPAIR-ANEURYSM-ABDOMINAL AORTIC-ENDOVASCULAR (AAA);  Surgeon: Jose Hernandez MD;  Location: [facility];  Service: Vascular;  Laterality: Bilateral;    CORONARY ANGIOPLASTY WITH STENT  PLACEMENT      X2    EPIDURAL STEROID INJECTION INTO CERVICAL SPINE N/A 7/10/2018    Procedure: Injection-steroid-epidural-cervical;  Surgeon: Kim Fuchs Jr., MD;  Location: [facility] OR;  Service: Pain Management;  Laterality: N/A;  to left    ERCP N/A 8/23/2024    Procedure: ERCP (ENDOSCOPIC RETROGRADE CHOLANGIOPANCREATOGRAPHY);  Surgeon: Brendon Figueroa MD;  Location: [facility];  Service: Endoscopy;  Laterality: N/A;    ERCP N/A 8/26/2024    Procedure: ERCP (ENDOSCOPIC RETROGRADE CHOLANGIOPANCREATOGRAPHY);  Surgeon: Eric Hunter MD;  Location: [facility] ENDO ([address] [place] [address]);  Service: Endoscopy;  Laterality: N/A;    ESOPHAGOGASTRODUODENOSCOPY N/A 3/14/2023    Procedure: EGD (ESOPHAGOGASTRODUODENOSCOPY);  Surgeon: Brendon Figueroa MD;  Location: [facility];  Service: Gastroenterology;  Laterality: N/A;    index finger amputation Left     LAPAROSCOPIC CHOLECYSTECTOMY N/A 8/19/2024    Procedure: CHOLECYSTECTOMY, LAPAROSCOPIC- converted to open;  Surgeon: Cristopher Daniel MD;  Location: [facility];  Service: General;  Laterality: N/A;    MANDIBLE SURGERY      wiring    RADIOFREQUENCY ABLATION OF LUMBAR MEDIAL BRANCH NERVE AT SINGLE LEVEL Bilateral 5/22/2018    Procedure: RADIOFREQUENCY THERMOCOAGULATION (RFTC)-NERVE-MEDIAN BRANCH-LUMBAR L3, L4, L5;  Surgeon: Bucky Paz MD;  Location: [facility];  Service: Pain Management;  Laterality: Bilateral;    SHOULDER ARTHROSCOPY Right      Family History    None       Tobacco Use    Smoking status: Every Day     Current packs/day: 2.00     Average packs/day: 2.0 packs/day for 30.0 years (60.0 ttl pk-yrs)     Types: Cigarettes    Smokeless tobacco: Never   Substance and Sexual Activity    Alcohol use: Yes     Alcohol/week: 1.0 standard drink of alcohol     Types: 1 Cans of beer per week    Drug use: No    Sexual activity: Not on file     Review of Systems   Constitutional:  Negative for chills and fever.   HENT:  Negative for hearing loss and sore throat.    Eyes:  Negative for  discharge and visual disturbance.   Respiratory:  Negative for chest tightness and shortness of breath.    Cardiovascular:  Negative for chest pain and leg swelling.   Gastrointestinal:  Positive for abdominal pain. Negative for abdominal distention, nausea and vomiting.   Genitourinary:  Negative for difficulty urinating and hematuria.   Musculoskeletal:  Positive for back pain. Negative for joint swelling.   Skin:  Negative for color change and rash.   Neurological:  Negative for numbness and headaches.     Objective:     Vital Signs (Most Recent):  Temp: 97.8 °F (36.6 °C) (08/27/24 1057)  Pulse: 78 (08/27/24 1118)  Resp: 18 (08/27/24 1057)  BP: 129/73 (08/27/24 1057)  SpO2: 95 % (08/27/24 1057) Vital Signs (24h Range):  Temp:  [97.5 °F (36.4 °C)-98.5 °F (36.9 °C)] 97.8 °F (36.6 °C)  Pulse:  [46-78] 78  Resp:  [14-19] 18  SpO2:  [95 %-99 %] 95 %  BP: (129-159)/(65-83) 129/73     Weight: 80.7 kg (177 lb 14.6 oz)  Body mass index is 27.05 kg/m².     Physical Exam  Constitutional:       General: He is not in acute distress.     Appearance: Normal appearance.   HENT:      Head: Normocephalic and atraumatic.   Cardiovascular:      Rate and Rhythm: Normal rate and regular rhythm.   Pulmonary:      Effort: Pulmonary effort is normal. No respiratory distress.   Abdominal:      General: Abdomen is flat. There is no distension.      Palpations: Abdomen is soft.      Comments: Mild abdominal tenderness  Healing incisions with staples in place  Drain in place, bilious output   Neurological:      General: No focal deficit present.      Mental Status: He is alert and oriented to person, place, and time.   Psychiatric:         Behavior: Behavior normal.         Thought Content: Thought content normal.            I have reviewed all pertinent lab results within the past 24 hours.  CBC:   Recent Labs   Lab 08/27/24  0529   WBC 6.73   RBC 4.45*   HGB 11.9*   HCT 36.4*      MCV 82   MCH 26.7*   MCHC 32.7     CMP:   Recent  Labs   Lab 08/27/24  0529   GLU 88   CALCIUM 9.0   ALBUMIN 2.6*   PROT 6.5      K 3.8   CO2 23      BUN 5*   CREATININE 0.7   ALKPHOS 127   ALT 11   AST 16   BILITOT 0.5       Significant Diagnostics:  I have reviewed all pertinent imaging results/findings within the past 24 hours.

## 2024-08-27 NOTE — TREATMENT PLAN
AES Treatment Plan    Adam Persaud is a 63 y.o. male admitted to hospital 8/24/2024 (Hospital Day: 4) due to Calculus of gallbladder with acute cholecystitis without obstruction.   S/P lap converted open subtotal cholecystectomy, left drain in place 8/19, then had ercp 8/23 but bile duct couldn't be cannulated  Now s/p ERCP 8/26 with plastic stent in cbd, plastic stent in the ventral PD    Interval History  Patient feels okay this morning. No acute abdominal pain. Belly is soft and nontender. Drain in place with scant output which patient says is since yesterday evening.    Objective  Temp:  [96.8 °F (36 °C)-98.5 °F (36.9 °C)] 97.5 °F (36.4 °C) (08/27 0730)  Pulse:  [46-86] 55 (08/27 0730)  BP: (101-159)/(62-83) 137/65 (08/27 0730)  Resp:  [12-20] 17 (08/27 0744)  SpO2:  [95 %-100 %] 96 % (08/27 0730)    General: Alert, Oriented x3, no distress  Abdomen: Soft. Non-distended. Non-tender. No rebound or guarding.    Laboratory    Recent Labs   Lab 08/27/24  0529   WBC 6.73   RBC 4.45*   HGB 11.9*   HCT 36.4*      MCV 82   MCH 26.7*   MCHC 32.7      Recent Labs   Lab 08/27/24  0529   CALCIUM 9.0   ALBUMIN 2.6*   PROT 6.5      K 3.8   CO2 23      BUN 5*   CREATININE 0.7   ALKPHOS 127   ALT 11   AST 16   BILITOT 0.5          Assessment and Plan  - encouraged to advance diet as tolerated  - continue abx 5 days   - hold plavix additional 3 days then okay to restart  - needs repeat ercp in 6 weeks, we will arrange  - We are signing-off. Please call with any questions.    Thank you for involving us in the care of Adam Persaud. Please call with any additional questions, concerns or changes in the patient's clinical status.    Goldie Cortez MD  Gastroenterology and Hepatology Fellow, PGY-4  Pager # [phone]

## 2024-08-28 PROBLEM — K59.00 CONSTIPATION: Status: ACTIVE | Noted: 2024-08-28

## 2024-08-28 PROBLEM — K59.03 DRUG-INDUCED CONSTIPATION: Status: ACTIVE | Noted: 2024-08-28

## 2024-08-28 LAB
ANION GAP SERPL CALC-SCNC: 9 MMOL/L (ref 8–16)
BASOPHILS # BLD AUTO: 0.03 K/UL (ref 0–0.2)
BASOPHILS NFR BLD: 0.4 % (ref 0–1.9)
BUN SERPL-MCNC: 6 MG/DL (ref 8–23)
CALCIUM SERPL-MCNC: 9.2 MG/DL (ref 8.7–10.5)
CHLORIDE SERPL-SCNC: 109 MMOL/L (ref 95–110)
CO2 SERPL-SCNC: 24 MMOL/L (ref 23–29)
CREAT SERPL-MCNC: 0.7 MG/DL (ref 0.5–1.4)
DIFFERENTIAL METHOD BLD: ABNORMAL
EOSINOPHIL # BLD AUTO: 0.1 K/UL (ref 0–0.5)
EOSINOPHIL NFR BLD: 1.4 % (ref 0–8)
ERYTHROCYTE [DISTWIDTH] IN BLOOD BY AUTOMATED COUNT: 14.8 % (ref 11.5–14.5)
EST. GFR  (NO RACE VARIABLE): >60 ML/MIN/1.73 M^2
GLUCOSE SERPL-MCNC: 139 MG/DL (ref 70–110)
HCT VFR BLD AUTO: 36.9 % (ref 40–54)
HGB BLD-MCNC: 12.1 G/DL (ref 14–18)
IMM GRANULOCYTES # BLD AUTO: 0.04 K/UL (ref 0–0.04)
IMM GRANULOCYTES NFR BLD AUTO: 0.5 % (ref 0–0.5)
LIPASE SERPL-CCNC: 37 U/L (ref 4–60)
LYMPHOCYTES # BLD AUTO: 1.6 K/UL (ref 1–4.8)
LYMPHOCYTES NFR BLD: 19.4 % (ref 18–48)
MAGNESIUM SERPL-MCNC: 2 MG/DL (ref 1.6–2.6)
MCH RBC QN AUTO: 26.9 PG (ref 27–31)
MCHC RBC AUTO-ENTMCNC: 32.8 G/DL (ref 32–36)
MCV RBC AUTO: 82 FL (ref 82–98)
MONOCYTES # BLD AUTO: 0.5 K/UL (ref 0.3–1)
MONOCYTES NFR BLD: 6.1 % (ref 4–15)
NEUTROPHILS # BLD AUTO: 6 K/UL (ref 1.8–7.7)
NEUTROPHILS NFR BLD: 72.2 % (ref 38–73)
NRBC BLD-RTO: 0 /100 WBC
PLATELET # BLD AUTO: 391 K/UL (ref 150–450)
PMV BLD AUTO: 10.9 FL (ref 9.2–12.9)
POCT GLUCOSE: 122 MG/DL (ref 70–110)
POCT GLUCOSE: 122 MG/DL (ref 70–110)
POTASSIUM SERPL-SCNC: 3.5 MMOL/L (ref 3.5–5.1)
RBC # BLD AUTO: 4.5 M/UL (ref 4.6–6.2)
SODIUM SERPL-SCNC: 142 MMOL/L (ref 136–145)
WBC # BLD AUTO: 8.34 K/UL (ref 3.9–12.7)

## 2024-08-28 PROCEDURE — 85025 COMPLETE CBC W/AUTO DIFF WBC: CPT | Performed by: STUDENT IN AN ORGANIZED HEALTH CARE EDUCATION/TRAINING PROGRAM

## 2024-08-28 PROCEDURE — 83690 ASSAY OF LIPASE: CPT | Performed by: STUDENT IN AN ORGANIZED HEALTH CARE EDUCATION/TRAINING PROGRAM

## 2024-08-28 PROCEDURE — 80048 BASIC METABOLIC PNL TOTAL CA: CPT | Performed by: STUDENT IN AN ORGANIZED HEALTH CARE EDUCATION/TRAINING PROGRAM

## 2024-08-28 PROCEDURE — 83735 ASSAY OF MAGNESIUM: CPT | Performed by: STUDENT IN AN ORGANIZED HEALTH CARE EDUCATION/TRAINING PROGRAM

## 2024-08-28 PROCEDURE — 63600175 PHARM REV CODE 636 W HCPCS: Performed by: STUDENT IN AN ORGANIZED HEALTH CARE EDUCATION/TRAINING PROGRAM

## 2024-08-28 PROCEDURE — 63600175 PHARM REV CODE 636 W HCPCS: Performed by: INTERNAL MEDICINE

## 2024-08-28 PROCEDURE — 25000003 PHARM REV CODE 250: Performed by: INTERNAL MEDICINE

## 2024-08-28 PROCEDURE — 36415 COLL VENOUS BLD VENIPUNCTURE: CPT | Performed by: STUDENT IN AN ORGANIZED HEALTH CARE EDUCATION/TRAINING PROGRAM

## 2024-08-28 PROCEDURE — 25000003 PHARM REV CODE 250: Performed by: STUDENT IN AN ORGANIZED HEALTH CARE EDUCATION/TRAINING PROGRAM

## 2024-08-28 PROCEDURE — 11000001 HC ACUTE MED/SURG PRIVATE ROOM

## 2024-08-28 RX ORDER — HYDROMORPHONE HYDROCHLORIDE 1 MG/ML
1 INJECTION, SOLUTION INTRAMUSCULAR; INTRAVENOUS; SUBCUTANEOUS EVERY 6 HOURS PRN
Status: DISCONTINUED | OUTPATIENT
Start: 2024-08-28 | End: 2024-08-28

## 2024-08-28 RX ORDER — HYDROCODONE BITARTRATE AND ACETAMINOPHEN 10; 325 MG/1; MG/1
1 TABLET ORAL EVERY 4 HOURS PRN
Status: DISCONTINUED | OUTPATIENT
Start: 2024-08-28 | End: 2024-08-30

## 2024-08-28 RX ORDER — POLYETHYLENE GLYCOL 3350 17 G/17G
17 POWDER, FOR SOLUTION ORAL 2 TIMES DAILY
Status: DISCONTINUED | OUTPATIENT
Start: 2024-08-28 | End: 2024-08-30 | Stop reason: HOSPADM

## 2024-08-28 RX ORDER — HYDROMORPHONE HYDROCHLORIDE 1 MG/ML
1 INJECTION, SOLUTION INTRAMUSCULAR; INTRAVENOUS; SUBCUTANEOUS EVERY 4 HOURS PRN
Status: DISCONTINUED | OUTPATIENT
Start: 2024-08-28 | End: 2024-08-28

## 2024-08-28 RX ORDER — SENNOSIDES 8.6 MG/1
8.6 TABLET ORAL 2 TIMES DAILY
Status: DISCONTINUED | OUTPATIENT
Start: 2024-08-28 | End: 2024-08-30 | Stop reason: HOSPADM

## 2024-08-28 RX ORDER — HYDROMORPHONE HYDROCHLORIDE 1 MG/ML
1 INJECTION, SOLUTION INTRAMUSCULAR; INTRAVENOUS; SUBCUTANEOUS EVERY 4 HOURS PRN
Status: DISCONTINUED | OUTPATIENT
Start: 2024-08-28 | End: 2024-08-30 | Stop reason: HOSPADM

## 2024-08-28 RX ORDER — LACTULOSE 10 G/15ML
20 SOLUTION ORAL ONCE
Status: COMPLETED | OUTPATIENT
Start: 2024-08-28 | End: 2024-08-28

## 2024-08-28 RX ADMIN — PIPERACILLIN SODIUM AND TAZOBACTAM SODIUM 4.5 G: 4; .5 INJECTION, POWDER, FOR SOLUTION INTRAVENOUS at 09:08

## 2024-08-28 RX ADMIN — PANTOPRAZOLE SODIUM 40 MG: 20 TABLET, DELAYED RELEASE ORAL at 08:08

## 2024-08-28 RX ADMIN — ATORVASTATIN CALCIUM 80 MG: 40 TABLET, FILM COATED ORAL at 08:08

## 2024-08-28 RX ADMIN — LACTULOSE 20 G: 20 SOLUTION ORAL at 12:08

## 2024-08-28 RX ADMIN — HYDROMORPHONE HYDROCHLORIDE 1 MG: 1 INJECTION, SOLUTION INTRAMUSCULAR; INTRAVENOUS; SUBCUTANEOUS at 01:08

## 2024-08-28 RX ADMIN — HYDROCODONE BITARTRATE AND ACETAMINOPHEN 1 TABLET: 10; 325 TABLET ORAL at 04:08

## 2024-08-28 RX ADMIN — HYDROMORPHONE HYDROCHLORIDE 1 MG: 1 INJECTION, SOLUTION INTRAMUSCULAR; INTRAVENOUS; SUBCUTANEOUS at 10:08

## 2024-08-28 RX ADMIN — LOSARTAN POTASSIUM 100 MG: 50 TABLET, FILM COATED ORAL at 08:08

## 2024-08-28 RX ADMIN — OXYCODONE HYDROCHLORIDE 10 MG: 10 TABLET ORAL at 08:08

## 2024-08-28 RX ADMIN — TRAZODONE HYDROCHLORIDE 50 MG: 50 TABLET ORAL at 08:08

## 2024-08-28 RX ADMIN — TIZANIDINE 4 MG: 4 TABLET ORAL at 01:08

## 2024-08-28 RX ADMIN — PIPERACILLIN SODIUM AND TAZOBACTAM SODIUM 4.5 G: 4; .5 INJECTION, POWDER, FOR SOLUTION INTRAVENOUS at 03:08

## 2024-08-28 RX ADMIN — HYDROMORPHONE HYDROCHLORIDE 1 MG: 1 INJECTION, SOLUTION INTRAMUSCULAR; INTRAVENOUS; SUBCUTANEOUS at 08:08

## 2024-08-28 RX ADMIN — TIZANIDINE 4 MG: 4 TABLET ORAL at 08:08

## 2024-08-28 RX ADMIN — TAMSULOSIN HYDROCHLORIDE 0.8 MG: 0.4 CAPSULE ORAL at 08:08

## 2024-08-28 RX ADMIN — ALLOPURINOL 300 MG: 100 TABLET ORAL at 08:08

## 2024-08-28 RX ADMIN — PIPERACILLIN SODIUM AND TAZOBACTAM SODIUM 4.5 G: 4; .5 INJECTION, POWDER, FOR SOLUTION INTRAVENOUS at 05:08

## 2024-08-28 RX ADMIN — MIRTAZAPINE 30 MG: 7.5 TABLET, FILM COATED ORAL at 08:08

## 2024-08-28 RX ADMIN — SENNOSIDES 8.6 MG: 8.6 TABLET, FILM COATED ORAL at 12:08

## 2024-08-28 RX ADMIN — Medication 6 MG: at 08:08

## 2024-08-28 RX ADMIN — GABAPENTIN 800 MG: 400 CAPSULE ORAL at 08:08

## 2024-08-28 RX ADMIN — HYDROCODONE BITARTRATE AND ACETAMINOPHEN 1 TABLET: 10; 325 TABLET ORAL at 12:08

## 2024-08-28 RX ADMIN — AMLODIPINE BESYLATE 10 MG: 10 TABLET ORAL at 08:08

## 2024-08-28 RX ADMIN — HYDROMORPHONE HYDROCHLORIDE 1 MG: 1 INJECTION, SOLUTION INTRAMUSCULAR; INTRAVENOUS; SUBCUTANEOUS at 06:08

## 2024-08-28 RX ADMIN — HYDROCODONE BITARTRATE AND ACETAMINOPHEN 1 TABLET: 10; 325 TABLET ORAL at 06:08

## 2024-08-28 RX ADMIN — METOPROLOL SUCCINATE 25 MG: 25 TABLET, EXTENDED RELEASE ORAL at 08:08

## 2024-08-28 NOTE — SUBJECTIVE & OBJECTIVE
Interval History: NAEON. s/p ERCP 8/26 which identified choledocholithiasis with distal CBD stone/sludge, an obvious bile leak was not observed. One temp stent placed into CBD and one temp stent placed into pancreatic duct.  MONIQUE drain with bilious output. Pain increased today - lipase normal - pain regimen increased. LFTs WNL.     Seen by surgery who said to wait for drainage to be clear - better today than yesterday. Surgery signed off.    Review of Systems   Constitutional:  Positive for activity change. Negative for appetite change, chills, diaphoresis, fatigue, fever and unexpected weight change.   Respiratory:  Negative for cough, chest tightness, shortness of breath, wheezing and stridor.    Cardiovascular:  Negative for chest pain, palpitations and leg swelling.   Gastrointestinal:  Positive for abdominal distention and abdominal pain. Negative for blood in stool, constipation, diarrhea, nausea and vomiting.   Endocrine: Negative for cold intolerance and heat intolerance.   Genitourinary:  Negative for difficulty urinating, dysuria, flank pain and frequency.   Musculoskeletal:  Positive for back pain and gait problem. Negative for arthralgias, joint swelling and myalgias.   Skin: Negative.    Neurological:  Negative for dizziness, weakness, light-headedness and headaches.     Objective:     Vital Signs (Most Recent):  Temp: 98.4 °F (36.9 °C) (08/28/24 1316)  Pulse: 74 (08/28/24 1522)  Resp: 18 (08/28/24 1329)  BP: 120/80 (08/28/24 1316)  SpO2: 96 % (08/28/24 1316) Vital Signs (24h Range):  Temp:  [97.5 °F (36.4 °C)-98.4 °F (36.9 °C)] 98.4 °F (36.9 °C)  Pulse:  [50-98] 74  Resp:  [16-20] 18  SpO2:  [96 %-99 %] 96 %  BP: (120-140)/(67-80) 120/80     Weight: 80.7 kg (177 lb 14.6 oz)  Body mass index is 27.05 kg/m².    Intake/Output Summary (Last 24 hours) at 8/28/2024 1523  Last data filed at 8/28/2024 0546  Gross per 24 hour   Intake --   Output 0 ml   Net 0 ml         Physical Exam  Constitutional:        General: He is not in acute distress.     Appearance: He is ill-appearing. He is not toxic-appearing.   HENT:      Head: Normocephalic and atraumatic.   Eyes:      Conjunctiva/sclera: Conjunctivae normal.      Pupils: Pupils are equal, round, and reactive to light.   Cardiovascular:      Rate and Rhythm: Normal rate and regular rhythm.      Heart sounds: Normal heart sounds.   Pulmonary:      Effort: Pulmonary effort is normal. No respiratory distress.      Breath sounds: Normal breath sounds. No wheezing or rales.   Abdominal:      General: Bowel sounds are normal. There is distension.      Palpations: Abdomen is soft.      Tenderness: There is abdominal tenderness.      Comments: MONIQUE drain with bilious output. Abdominal incisions with dressing in place CDI   Musculoskeletal:         General: Normal range of motion.      Cervical back: Normal range of motion and neck supple.      Right lower leg: No edema.      Left lower leg: No edema.   Skin:     General: Skin is warm and dry.   Neurological:      General: No focal deficit present.      Mental Status: He is alert and oriented to person, place, and time. Mental status is at baseline.   Psychiatric:         Mood and Affect: Mood normal.         Behavior: Behavior normal.             Significant Labs: All pertinent labs within the past 24 hours have been reviewed.    Significant Imaging: I have reviewed all pertinent imaging results/findings within the past 24 hours.

## 2024-08-28 NOTE — PLAN OF CARE
Problem: Adult Inpatient Plan of Care  Goal: Plan of Care Review  Outcome: Progressing  Goal: Patient-Specific Goal (Individualized)  Outcome: Progressing  Goal: Optimal Comfort and Wellbeing  Outcome: Progressing     Problem: Wound  Goal: Optimal Coping  Outcome: Progressing  Goal: Skin Health and Integrity  Outcome: Progressing     Problem: Fall Injury Risk  Goal: Absence of Fall and Fall-Related Injury  Outcome: Progressing

## 2024-08-28 NOTE — PROGRESS NOTES
[facility] - Neurosurgery ([facility])  [facility] Medicine  Progress Note    Patient Name: Adam Persaud  MRN: [id number]  Patient Class: IP- Inpatient   Admission Date: 8/24/2024  Length of Stay: 4 days  Attending Physician: Pablo Barnett MD  Primary Care Provider: Liv Dai MD        Subjective:     Principal Problem:Calculus of gallbladder with acute cholecystitis without obstruction        HPI:  63M with PMH of CAD s/p CABG, chronic back pain, gout, HTN, HLD, endovascular aortic aneurysm repair (July 2024) admitted to [facility] on 8/16 for acute cholecystitis which was confirmed with imaging. General surgery performed cholecystectomy with MONIQUE drain placement on 8/19, laparoscopic cholecystectomy was converted to an open cholecystectomy as procedure was complicated by gallbladder necrosis and adherence to bowels with approximately 2100 mL estimated blood loss. He was monitored in the ICU post-operatively, but has subsequently stepped down to a floor bed. Abdominal pain persisted postoperatively requiring PCA. General Surgery noted MONIQUE drain had bilious drainage on 8/21. Gastroenterology evaluated patient for concern of bile duct leak. HIDA scan on 8/23 had concern for bile leak into the peritoneal cavity/MONIQUE drain. He underwent ERCP on 8/23 but unable to cannulate the bile duct. Referring team spoke with AES at [facility] and patient subsequently transferred for AES evaluation. Patient currently HDS and pain well controlled off PCA. Labwork today: Sodium 139, potassium 3.7, chloride 103, CO2 28, BUN 7, creatinine 0.65, glucose 96, total bilirubin 1, AST 24, ALT 15, white blood cells 6.43, hemoglobin 10.4, hematocrit 31.7, platelets 331. He will be admitted to hospital medicine service for further management.     Overview/Hospital Course:  63M with PMH of CAD s/p CABG, chronic back pain, gout, HTN, HLD, endovascular aortic aneurysm repair (July 2024) admitted to [facility] on 8/16 for acute cholecystitis. S/p open noe  8/19 at [facility], complicated by necrosis and adherence to bowel wall with 2100cc blood loss. HIDA 8/23 concerning for bile leak/CBD obstruction. ERCP 8/23, unable to cannulate the bile duct. Transferred to [facility] for AES evaluation. s/p ERCP today which identified choledocholithiasis with distal CBD stone/sludge, an obvious bile leak was not observed. One temp stent placed into CBD and one temp stent placed into pancreatic duct. Cont zosyn while inpatient, can transition to cipro on discharge to complete 5 day course. Continue to monitor output from RUQ drain. Consult gen surgery for assistance/recs.    Interval History: NAEON. s/p ERCP 8/26 which identified choledocholithiasis with distal CBD stone/sludge, an obvious bile leak was not observed. One temp stent placed into CBD and one temp stent placed into pancreatic duct.  MONIQUE drain with bilious output. Pain increased today - lipase normal - pain regimen increased. LFTs WNL.     Seen by surgery who said to wait for drainage to be clear - better today than yesterday. Surgery signed off.    Review of Systems   Constitutional:  Positive for activity change. Negative for appetite change, chills, diaphoresis, fatigue, fever and unexpected weight change.   Respiratory:  Negative for cough, chest tightness, shortness of breath, wheezing and stridor.    Cardiovascular:  Negative for chest pain, palpitations and leg swelling.   Gastrointestinal:  Positive for abdominal distention and abdominal pain. Negative for blood in stool, constipation, diarrhea, nausea and vomiting.   Endocrine: Negative for cold intolerance and heat intolerance.   Genitourinary:  Negative for difficulty urinating, dysuria, flank pain and frequency.   Musculoskeletal:  Positive for back pain and gait problem. Negative for arthralgias, joint swelling and myalgias.   Skin: Negative.    Neurological:  Negative for dizziness, weakness, light-headedness and headaches.     Objective:     Vital Signs  (Most Recent):  Temp: 98.4 °F (36.9 °C) (08/28/24 1316)  Pulse: 74 (08/28/24 1522)  Resp: 18 (08/28/24 1329)  BP: 120/80 (08/28/24 1316)  SpO2: 96 % (08/28/24 1316) Vital Signs (24h Range):  Temp:  [97.5 °F (36.4 °C)-98.4 °F (36.9 °C)] 98.4 °F (36.9 °C)  Pulse:  [50-98] 74  Resp:  [16-20] 18  SpO2:  [96 %-99 %] 96 %  BP: (120-140)/(67-80) 120/80     Weight: 80.7 kg (177 lb 14.6 oz)  Body mass index is 27.05 kg/m².    Intake/Output Summary (Last 24 hours) at 8/28/2024 1523  Last data filed at 8/28/2024 0546  Gross per 24 hour   Intake --   Output 0 ml   Net 0 ml         Physical Exam  Constitutional:       General: He is not in acute distress.     Appearance: He is ill-appearing. He is not toxic-appearing.   HENT:      Head: Normocephalic and atraumatic.   Eyes:      Conjunctiva/sclera: Conjunctivae normal.      Pupils: Pupils are equal, round, and reactive to light.   Cardiovascular:      Rate and Rhythm: Normal rate and regular rhythm.      Heart sounds: Normal heart sounds.   Pulmonary:      Effort: Pulmonary effort is normal. No respiratory distress.      Breath sounds: Normal breath sounds. No wheezing or rales.   Abdominal:      General: Bowel sounds are normal. There is distension.      Palpations: Abdomen is soft.      Tenderness: There is abdominal tenderness.      Comments: MONIQUE drain with bilious output. Abdominal incisions with dressing in place CDI   Musculoskeletal:         General: Normal range of motion.      Cervical back: Normal range of motion and neck supple.      Right lower leg: No edema.      Left lower leg: No edema.   Skin:     General: Skin is warm and dry.   Neurological:      General: No focal deficit present.      Mental Status: He is alert and oriented to person, place, and time. Mental status is at baseline.   Psychiatric:         Mood and Affect: Mood normal.         Behavior: Behavior normal.             Significant Labs: All pertinent labs within the past 24 hours have been  reviewed.    Significant Imaging: I have reviewed all pertinent imaging results/findings within the past 24 hours.    Assessment/Plan:      * Calculus of gallbladder with acute cholecystitis without obstruction  S/p open noe 8/19, complicated by necrosis and adherence to bowel wall with 2100cc blood loss  HIDA 8/23 concerning for bile leak/CBD obstruction   ERCP 8/23, unable to cannulate the bile duct  Transferred to [facility] for AES evaluation  Continue Zosyn   AES consulted  Lipase normal  Pain control     s/p ERCP 8/26 which identified choledocholithiasis with distal CBD stone/sludge, an obvious bile leak was not observed. One temp stent placed into CBD and one temp stent placed into pancreatic duct  Cont zosyn while inpatient, will transition to cipro on discharge to complete 5 day course day 3/5 today  Continue to monitor output from RUQ drain.   -Consult gen surgery for assistance/recs - awaiting assessment  -Resume Plavix in 2 days.   -Repeat ERCP in 6 weeks to remove CBD stent. The pancreatic stent should pass spontaneously.  Monitor for pancreatitis, bleeding, perforation, and cholangitis     Constipation    Multifactorial iso abdominal surgery and opiates  Wean as able - currently pain not well controlled - possibly constipation is also contributing to pian   Bowel regimen BID with lactulose x1  Monitor    Diarrhea  Stop stool softeners   Improved      History of coronary artery bypass graft        Pain        S/P AAA (abdominal aortic aneurysm) repair  Known AAA atherosclerotic arterial disease  8/16 CTA aorto bi-iliac stent appears in satisfactory position with no stenosis or extravasation of the stent, no stenosis  Continue beta blocker and BP control       ACP (advance care planning)  Full code      BPH (benign prostatic hyperplasia)  Cont tamsulosin      Gout  Cont allopurinol      Prediabetes  Hemoglobin A1c of 5.7        Insomnia        CAD (coronary artery disease)  ASA/plavix held for  procedure    Lumbar spondylosis  Pain control      HLD (hyperlipidemia)  Cont statin      HTN (hypertension)  Chronic, controlled. Latest blood pressure and vitals reviewed-     Temp:  [96.8 °F (36 °C)-98.6 °F (37 °C)]   Pulse:  [51-86]   Resp:  [12-20]   BP: (101-163)/(62-83)   SpO2:  [95 %-100 %] .   Home meds for hypertension were reviewed and noted below.   Hypertension Medications               amLODIPine (NORVASC) 10 MG tablet Take 10 mg by mouth once daily.    losartan (COZAAR) 100 MG tablet Take 100 mg by mouth once daily.    metoprolol succinate (TOPROL-XL) 25 MG 24 hr tablet Take 25 mg by mouth every evening.    nitroGLYCERIN (NITROSTAT) 0.4 MG SL tablet Place 0.4 mg under the tongue every 5 (five) minutes as needed for Chest pain.            While in the hospital, will manage blood pressure as follows; Continue home antihypertensive regimen    Will utilize p.r.n. blood pressure medication only if patient's blood pressure greater than 180/110 and he develops symptoms such as worsening chest pain or shortness of breath.      VTE Risk Mitigation (From admission, onward)           Ordered     Reason for No Pharmacological VTE Prophylaxis  Once        Question:  Reasons:  Answer:  Risk of Bleeding    08/24/24 1525     IP VTE HIGH RISK PATIENT  Once         08/24/24 1525                    Discharge Planning   TIM: 8/30/2024     Code Status: Full Code   Is the patient medically ready for discharge?:     Reason for patient still in hospital (select all that apply): Treatment  Discharge Plan A: Home                  Pablo Barnett MD  Department of Hospital Medicine   [facility] - Neurosurgery ([facility])

## 2024-08-28 NOTE — SUBJECTIVE & OBJECTIVE
Interval History: Hypertensive to the 140s/70s. No tenderness on exam today. Drain serobilious with minimal output. No acute complaints this morning.     Medications:  Continuous Infusions:  Scheduled Meds:   allopurinoL  300 mg Oral Daily    amLODIPine  10 mg Oral Daily    atorvastatin  80 mg Oral Daily    gabapentin  800 mg Oral QHS    losartan  100 mg Oral Daily    metoprolol succinate  25 mg Oral QHS    mirtazapine  30 mg Oral QHS    pantoprazole  40 mg Oral BID    piperacillin-tazobactam (ZOSYN) 4.5 g in D5W 100 mL IVPB (MB+)  4.5 g Intravenous Q8H    tamsulosin  0.8 mg Oral QHS    traZODone  50 mg Oral QHS     PRN Meds:  Current Facility-Administered Medications:     acetaminophen, 650 mg, Oral, Q8H PRN    albuterol-ipratropium, 3 mL, Nebulization, Q6H PRN    aluminum-magnesium hydroxide-simethicone, 30 mL, Oral, QID PRN    dextrose 10%, 12.5 g, Intravenous, PRN    dextrose 10%, 12.5 g, Intravenous, PRN    dextrose 10%, 25 g, Intravenous, PRN    dextrose 10%, 25 g, Intravenous, PRN    glucagon (human recombinant), 1 mg, Intramuscular, PRN    glucose, 16 g, Oral, PRN    glucose, 24 g, Oral, PRN    HYDROcodone-acetaminophen, 1 tablet, Oral, Q6H PRN    HYDROmorphone, 1 mg, Intravenous, Q6H PRN    melatonin, 6 mg, Oral, Nightly PRN    naloxone, 0.02 mg, Intravenous, PRN    nitroGLYCERIN, 0.4 mg, Sublingual, Q5 Min PRN    ondansetron, 4 mg, Intravenous, Q6H PRN    oxyCODONE, 10 mg, Oral, Q4H PRN    prochlorperazine, 5 mg, Intravenous, Q6H PRN    simethicone, 1 tablet, Oral, QID PRN    sodium chloride 0.9%, 10 mL, Intravenous, Q12H PRN    tiZANidine, 4 mg, Oral, Q8H PRN     Review of patient's allergies indicates:  No Known Allergies  Objective:     Vital Signs (Most Recent):  Temp: 97.7 °F (36.5 °C) (08/28/24 0811)  Pulse: 72 (08/28/24 0811)  Resp: 18 (08/28/24 0847)  BP: (!) 140/79 (08/28/24 0811)  SpO2: 98 % (08/28/24 0811) Vital Signs (24h Range):  Temp:  [97.5 °F (36.4 °C)-98.1 °F (36.7 °C)] 97.7 °F (36.5  °C)  Pulse:  [50-78] 72  Resp:  [16-20] 18  SpO2:  [95 %-99 %] 98 %  BP: (128-140)/(67-79) 140/79     Weight: 80.7 kg (177 lb 14.6 oz)  Body mass index is 27.05 kg/m².    Intake/Output - Last 3 Shifts         08/26 0700 08/27 0659 08/27 0700 08/28 0659 08/28 0700 08/29 0659    P.O. 120      I.V. (mL/kg) 104.4 (1.3)      IV Piggyback 637.8      Total Intake(mL/kg) 862.2 (10.7)      Urine (mL/kg/hr) 1000 (0.5)      Emesis/NG output       Drains 140 0     Stool       Blood       Total Output 1140 0     Net -277.8 0                     Physical Exam  Constitutional:       Appearance: Normal appearance.   HENT:      Head: Normocephalic and atraumatic.   Eyes:      Pupils: Pupils are equal, round, and reactive to light.   Cardiovascular:      Rate and Rhythm: Normal rate.   Pulmonary:      Effort: Pulmonary effort is normal.      Breath sounds: Normal breath sounds.   Abdominal:      General: There is no distension.      Palpations: Abdomen is soft.      Tenderness: There is no abdominal tenderness.      Comments: Incision closed with staples. No drainage or signs of infection. Drain clearing up to serobilious.   Musculoskeletal:         General: No tenderness. Normal range of motion.      Cervical back: Normal range of motion.   Skin:     General: Skin is warm and dry.      Coloration: Skin is not jaundiced.   Neurological:      Mental Status: He is alert.          Significant Labs:  I have reviewed all pertinent lab results within the past 24 hours.    Significant Diagnostics:  I have reviewed all pertinent imaging results/findings within the past 24 hours.

## 2024-08-28 NOTE — PLAN OF CARE
POC updated and reviewed with the patient at the bedside. Questions regarding POC were encouraged and addressed. VSS, see flowsheets. Patient is AOX 4 at this time. Tele maintained per order. Fall and safety precautions maintained, no signs of injury noted during shift. Patient repositioned independently bed for comfort. Upon exiting room, patient's bed locked in low position, side rails up x 2, bed alarm refused/not required, with call light within reach. Contact provider as needed to update pt's pain management plan. No acute signs of distress noted at this time.      Problem: Adult Inpatient Plan of Care  Goal: Plan of Care Review  Outcome: Progressing  Goal: Patient-Specific Goal (Individualized)  Outcome: Progressing  Goal: Optimal Comfort and Wellbeing  Outcome: Progressing  Goal: Readiness for Transition of Care  Outcome: Progressing     Problem: Wound  Goal: Optimal Coping  Outcome: Progressing  Goal: Absence of Infection Signs and Symptoms  Outcome: Progressing  Goal: Skin Health and Integrity  Outcome: Progressing  Goal: Optimal Wound Healing  Outcome: Progressing     Problem: Fall Injury Risk  Goal: Absence of Fall and Fall-Related Injury  Outcome: Progressing     Problem: Anxiety  Goal: Anxiety Reduction or Resolution  Outcome: Progressing     Problem: Pain Acute  Goal: Optimal Pain Control and Function  Outcome: Progressing

## 2024-08-28 NOTE — ASSESSMENT & PLAN NOTE
S/p open noe 8/19, complicated by necrosis and adherence to bowel wall with 2100cc blood loss  HIDA 8/23 concerning for bile leak/CBD obstruction   ERCP 8/23, unable to cannulate the bile duct  Transferred to [facility] for AES evaluation  Continue Zosyn   AES consulted  Lipase normal  Pain control     s/p ERCP 8/26 which identified choledocholithiasis with distal CBD stone/sludge, an obvious bile leak was not observed. One temp stent placed into CBD and one temp stent placed into pancreatic duct  Cont zosyn while inpatient, will transition to cipro on discharge to complete 5 day course day 3/5 today  Continue to monitor output from RUQ drain.   -Consult gen surgery for assistance/recs - awaiting assessment  -Resume Plavix in 2 days.   -Repeat ERCP in 6 weeks to remove CBD stent. The pancreatic stent should pass spontaneously.  Monitor for pancreatitis, bleeding, perforation, and cholangitis

## 2024-08-28 NOTE — PROGRESS NOTES
[facility] - Neurosurgery ([facility])  General Surgery  Progress Note    Subjective:     History of Present Illness:  Adam Persaud is a 63 y.o. gentleman with history of CAD s/p CABG on asa and plavix, chronic back pain, gout, HTN, and endovascular aortic aneurysm repair (July 2024) who underwent a lap noe on 8/19. The cholecystectomy was complicated and converted to open. Postoperatively, the drain output became bilious. A HIDA scan confirmed a biliary leak. An ERCP was attempted but unsuccessful so he was transferred to [facility] for AES evaluation. He ha since undergone an ERCP on 8/26 with a stent placed. Interestingly, no leak was seen during the ERCP. General surgery consulted for evaluation.       Post-Op Info:  Procedure(s) (LRB):  ERCP (ENDOSCOPIC RETROGRADE CHOLANGIOPANCREATOGRAPHY) (N/A)   2 Days Post-Op     Interval History: Hypertensive to the 140s/70s. No tenderness on exam today. Drain serobilious with minimal output. No acute complaints this morning.     Medications:  Continuous Infusions:  Scheduled Meds:   allopurinoL  300 mg Oral Daily    amLODIPine  10 mg Oral Daily    atorvastatin  80 mg Oral Daily    gabapentin  800 mg Oral QHS    losartan  100 mg Oral Daily    metoprolol succinate  25 mg Oral QHS    mirtazapine  30 mg Oral QHS    pantoprazole  40 mg Oral BID    piperacillin-tazobactam (ZOSYN) 4.5 g in D5W 100 mL IVPB (MB+)  4.5 g Intravenous Q8H    tamsulosin  0.8 mg Oral QHS    traZODone  50 mg Oral QHS     PRN Meds:  Current Facility-Administered Medications:     acetaminophen, 650 mg, Oral, Q8H PRN    albuterol-ipratropium, 3 mL, Nebulization, Q6H PRN    aluminum-magnesium hydroxide-simethicone, 30 mL, Oral, QID PRN    dextrose 10%, 12.5 g, Intravenous, PRN    dextrose 10%, 12.5 g, Intravenous, PRN    dextrose 10%, 25 g, Intravenous, PRN    dextrose 10%, 25 g, Intravenous, PRN    glucagon (human recombinant), 1 mg, Intramuscular, PRN    glucose, 16 g, Oral, PRN    glucose, 24 g, Oral, PRN     HYDROcodone-acetaminophen, 1 tablet, Oral, Q6H PRN    HYDROmorphone, 1 mg, Intravenous, Q6H PRN    melatonin, 6 mg, Oral, Nightly PRN    naloxone, 0.02 mg, Intravenous, PRN    nitroGLYCERIN, 0.4 mg, Sublingual, Q5 Min PRN    ondansetron, 4 mg, Intravenous, Q6H PRN    oxyCODONE, 10 mg, Oral, Q4H PRN    prochlorperazine, 5 mg, Intravenous, Q6H PRN    simethicone, 1 tablet, Oral, QID PRN    sodium chloride 0.9%, 10 mL, Intravenous, Q12H PRN    tiZANidine, 4 mg, Oral, Q8H PRN     Review of patient's allergies indicates:  No Known Allergies  Objective:     Vital Signs (Most Recent):  Temp: 97.7 °F (36.5 °C) (08/28/24 0811)  Pulse: 72 (08/28/24 0811)  Resp: 18 (08/28/24 0847)  BP: (!) 140/79 (08/28/24 0811)  SpO2: 98 % (08/28/24 0811) Vital Signs (24h Range):  Temp:  [97.5 °F (36.4 °C)-98.1 °F (36.7 °C)] 97.7 °F (36.5 °C)  Pulse:  [50-78] 72  Resp:  [16-20] 18  SpO2:  [95 %-99 %] 98 %  BP: (128-140)/(67-79) 140/79     Weight: 80.7 kg (177 lb 14.6 oz)  Body mass index is 27.05 kg/m².    Intake/Output - Last 3 Shifts         08/26 0700 08/27 0659 08/27 0700 08/28 0659 08/28 0700 08/29 0659    P.O. 120      I.V. (mL/kg) 104.4 (1.3)      IV Piggyback 637.8      Total Intake(mL/kg) 862.2 (10.7)      Urine (mL/kg/hr) 1000 (0.5)      Emesis/NG output       Drains 140 0     Stool       Blood       Total Output 1140 0     Net -277.8 0                     Physical Exam  Constitutional:       Appearance: Normal appearance.   HENT:      Head: Normocephalic and atraumatic.   Eyes:      Pupils: Pupils are equal, round, and reactive to light.   Cardiovascular:      Rate and Rhythm: Normal rate.   Pulmonary:      Effort: Pulmonary effort is normal.      Breath sounds: Normal breath sounds.   Abdominal:      General: There is no distension.      Palpations: Abdomen is soft.      Tenderness: There is no abdominal tenderness.      Comments: Incision closed with staples. No drainage or signs of infection. Drain clearing up to serobilious.    Musculoskeletal:         General: No tenderness. Normal range of motion.      Cervical back: Normal range of motion.   Skin:     General: Skin is warm and dry.      Coloration: Skin is not jaundiced.   Neurological:      Mental Status: He is alert.          Significant Labs:  I have reviewed all pertinent lab results within the past 24 hours.    Significant Diagnostics:  I have reviewed all pertinent imaging results/findings within the past 24 hours.  Assessment/Plan:     * Calculus of gallbladder with acute cholecystitis without obstruction  Adam Persaud is a 63 y.o. gentleman who underwent a lap converted to open noe for acute cholecystitis on 8/19, postop course complicated by a biliary leak. Drain clearing up today with low output.     - keep MONIQUE drain in place, likely will discharge from hospital with it  - multimodal pain control, has pain meds at home  - keep monitoring quality of drain output, would not dc until the output has become serous.   -output minimal   -clearer today compared to yesterday  - will sign off at this time. Please reach out with any future concerns.         Conner Crump MD  General Surgery  [facility] - Neurosurgery ([facility])

## 2024-08-28 NOTE — ASSESSMENT & PLAN NOTE
Adam Persaud is a 63 y.o. gentleman who underwent a lap converted to open noe for acute cholecystitis on 8/19, postop course complicated by a biliary leak. Drain clearing up today with low output.     - keep MONIQUE drain in place, likely will discharge from hospital with it  - multimodal pain control, has pain meds at home  - keep monitoring quality of drain output, would not dc until the output has become serous.   -output minimal   -clearer today compared to yesterday  - will sign off at this time. Please reach out with any future concerns.

## 2024-08-28 NOTE — ASSESSMENT & PLAN NOTE
Multifactorial iso abdominal surgery and opiates  Wean as able - currently pain not well controlled - possibly constipation is also contributing to pian   Bowel regimen BID with lactulose x1  Monitor

## 2024-08-29 PROBLEM — K80.66 CALCULUS OF GALLBLADDER AND BILE DUCT WITH ACUTE ON CHRONIC CHOLECYSTITIS WITHOUT OBSTRUCTION: Status: RESOLVED | Noted: 2024-08-27 | Resolved: 2024-08-29

## 2024-08-29 PROBLEM — E87.6 HYPOKALEMIA: Status: ACTIVE | Noted: 2024-08-29

## 2024-08-29 LAB
ANION GAP SERPL CALC-SCNC: 9 MMOL/L (ref 8–16)
BASOPHILS # BLD AUTO: 0.04 K/UL (ref 0–0.2)
BASOPHILS NFR BLD: 0.5 % (ref 0–1.9)
BUN SERPL-MCNC: 9 MG/DL (ref 8–23)
CALCIUM SERPL-MCNC: 9 MG/DL (ref 8.7–10.5)
CHLORIDE SERPL-SCNC: 107 MMOL/L (ref 95–110)
CO2 SERPL-SCNC: 23 MMOL/L (ref 23–29)
CREAT SERPL-MCNC: 0.7 MG/DL (ref 0.5–1.4)
DIFFERENTIAL METHOD BLD: ABNORMAL
EOSINOPHIL # BLD AUTO: 0.1 K/UL (ref 0–0.5)
EOSINOPHIL NFR BLD: 1.7 % (ref 0–8)
ERYTHROCYTE [DISTWIDTH] IN BLOOD BY AUTOMATED COUNT: 14.9 % (ref 11.5–14.5)
EST. GFR  (NO RACE VARIABLE): >60 ML/MIN/1.73 M^2
GLUCOSE SERPL-MCNC: 99 MG/DL (ref 70–110)
HCT VFR BLD AUTO: 37 % (ref 40–54)
HGB BLD-MCNC: 11.8 G/DL (ref 14–18)
IMM GRANULOCYTES # BLD AUTO: 0.03 K/UL (ref 0–0.04)
IMM GRANULOCYTES NFR BLD AUTO: 0.4 % (ref 0–0.5)
LYMPHOCYTES # BLD AUTO: 2 K/UL (ref 1–4.8)
LYMPHOCYTES NFR BLD: 24.6 % (ref 18–48)
MAGNESIUM SERPL-MCNC: 2 MG/DL (ref 1.6–2.6)
MCH RBC QN AUTO: 26.4 PG (ref 27–31)
MCHC RBC AUTO-ENTMCNC: 31.9 G/DL (ref 32–36)
MCV RBC AUTO: 83 FL (ref 82–98)
MONOCYTES # BLD AUTO: 0.8 K/UL (ref 0.3–1)
MONOCYTES NFR BLD: 9.8 % (ref 4–15)
NEUTROPHILS # BLD AUTO: 5.1 K/UL (ref 1.8–7.7)
NEUTROPHILS NFR BLD: 63 % (ref 38–73)
NRBC BLD-RTO: 0 /100 WBC
PLATELET # BLD AUTO: 366 K/UL (ref 150–450)
PMV BLD AUTO: 10.9 FL (ref 9.2–12.9)
POCT GLUCOSE: 118 MG/DL (ref 70–110)
POTASSIUM SERPL-SCNC: 3.4 MMOL/L (ref 3.5–5.1)
RBC # BLD AUTO: 4.47 M/UL (ref 4.6–6.2)
SODIUM SERPL-SCNC: 139 MMOL/L (ref 136–145)
WBC # BLD AUTO: 8.16 K/UL (ref 3.9–12.7)

## 2024-08-29 PROCEDURE — S4991 NICOTINE PATCH NONLEGEND: HCPCS | Performed by: STUDENT IN AN ORGANIZED HEALTH CARE EDUCATION/TRAINING PROGRAM

## 2024-08-29 PROCEDURE — 63600175 PHARM REV CODE 636 W HCPCS: Performed by: STUDENT IN AN ORGANIZED HEALTH CARE EDUCATION/TRAINING PROGRAM

## 2024-08-29 PROCEDURE — 83735 ASSAY OF MAGNESIUM: CPT | Performed by: STUDENT IN AN ORGANIZED HEALTH CARE EDUCATION/TRAINING PROGRAM

## 2024-08-29 PROCEDURE — 25000003 PHARM REV CODE 250: Performed by: INTERNAL MEDICINE

## 2024-08-29 PROCEDURE — 85025 COMPLETE CBC W/AUTO DIFF WBC: CPT | Performed by: STUDENT IN AN ORGANIZED HEALTH CARE EDUCATION/TRAINING PROGRAM

## 2024-08-29 PROCEDURE — 36415 COLL VENOUS BLD VENIPUNCTURE: CPT | Performed by: STUDENT IN AN ORGANIZED HEALTH CARE EDUCATION/TRAINING PROGRAM

## 2024-08-29 PROCEDURE — 11000001 HC ACUTE MED/SURG PRIVATE ROOM

## 2024-08-29 PROCEDURE — 80048 BASIC METABOLIC PNL TOTAL CA: CPT | Performed by: STUDENT IN AN ORGANIZED HEALTH CARE EDUCATION/TRAINING PROGRAM

## 2024-08-29 PROCEDURE — 25000003 PHARM REV CODE 250: Performed by: STUDENT IN AN ORGANIZED HEALTH CARE EDUCATION/TRAINING PROGRAM

## 2024-08-29 PROCEDURE — 63600175 PHARM REV CODE 636 W HCPCS: Performed by: INTERNAL MEDICINE

## 2024-08-29 RX ORDER — POTASSIUM CHLORIDE 20 MEQ/1
40 TABLET, EXTENDED RELEASE ORAL ONCE
Status: COMPLETED | OUTPATIENT
Start: 2024-08-29 | End: 2024-08-29

## 2024-08-29 RX ORDER — IBUPROFEN 200 MG
1 TABLET ORAL DAILY
Status: DISCONTINUED | OUTPATIENT
Start: 2024-08-29 | End: 2024-08-30 | Stop reason: HOSPADM

## 2024-08-29 RX ORDER — METOCLOPRAMIDE 5 MG/1
10 TABLET ORAL
Status: DISCONTINUED | OUTPATIENT
Start: 2024-08-29 | End: 2024-08-30 | Stop reason: HOSPADM

## 2024-08-29 RX ADMIN — OXYCODONE HYDROCHLORIDE 10 MG: 10 TABLET ORAL at 09:08

## 2024-08-29 RX ADMIN — TIZANIDINE 4 MG: 4 TABLET ORAL at 09:08

## 2024-08-29 RX ADMIN — HYDROCODONE BITARTRATE AND ACETAMINOPHEN 1 TABLET: 10; 325 TABLET ORAL at 04:08

## 2024-08-29 RX ADMIN — HYDROMORPHONE HYDROCHLORIDE 1 MG: 1 INJECTION, SOLUTION INTRAMUSCULAR; INTRAVENOUS; SUBCUTANEOUS at 10:08

## 2024-08-29 RX ADMIN — ALLOPURINOL 300 MG: 100 TABLET ORAL at 08:08

## 2024-08-29 RX ADMIN — AMLODIPINE BESYLATE 10 MG: 10 TABLET ORAL at 08:08

## 2024-08-29 RX ADMIN — HYDROCODONE BITARTRATE AND ACETAMINOPHEN 1 TABLET: 10; 325 TABLET ORAL at 07:08

## 2024-08-29 RX ADMIN — Medication 1 PATCH: at 11:08

## 2024-08-29 RX ADMIN — TRAZODONE HYDROCHLORIDE 50 MG: 50 TABLET ORAL at 09:08

## 2024-08-29 RX ADMIN — HYDROCODONE BITARTRATE AND ACETAMINOPHEN 1 TABLET: 10; 325 TABLET ORAL at 11:08

## 2024-08-29 RX ADMIN — HYDROMORPHONE HYDROCHLORIDE 1 MG: 1 INJECTION, SOLUTION INTRAMUSCULAR; INTRAVENOUS; SUBCUTANEOUS at 09:08

## 2024-08-29 RX ADMIN — METOCLOPRAMIDE 10 MG: 5 TABLET ORAL at 04:08

## 2024-08-29 RX ADMIN — GABAPENTIN 800 MG: 400 CAPSULE ORAL at 09:08

## 2024-08-29 RX ADMIN — HYDROMORPHONE HYDROCHLORIDE 1 MG: 1 INJECTION, SOLUTION INTRAMUSCULAR; INTRAVENOUS; SUBCUTANEOUS at 01:08

## 2024-08-29 RX ADMIN — POTASSIUM CHLORIDE 40 MEQ: 1500 TABLET, EXTENDED RELEASE ORAL at 11:08

## 2024-08-29 RX ADMIN — HYDROMORPHONE HYDROCHLORIDE 1 MG: 1 INJECTION, SOLUTION INTRAMUSCULAR; INTRAVENOUS; SUBCUTANEOUS at 06:08

## 2024-08-29 RX ADMIN — METOCLOPRAMIDE 10 MG: 5 TABLET ORAL at 11:08

## 2024-08-29 RX ADMIN — PIPERACILLIN SODIUM AND TAZOBACTAM SODIUM 4.5 G: 4; .5 INJECTION, POWDER, FOR SOLUTION INTRAVENOUS at 02:08

## 2024-08-29 RX ADMIN — TAMSULOSIN HYDROCHLORIDE 0.8 MG: 0.4 CAPSULE ORAL at 09:08

## 2024-08-29 RX ADMIN — HYDROMORPHONE HYDROCHLORIDE 1 MG: 1 INJECTION, SOLUTION INTRAMUSCULAR; INTRAVENOUS; SUBCUTANEOUS at 04:08

## 2024-08-29 RX ADMIN — PANTOPRAZOLE SODIUM 40 MG: 20 TABLET, DELAYED RELEASE ORAL at 08:08

## 2024-08-29 RX ADMIN — PIPERACILLIN SODIUM AND TAZOBACTAM SODIUM 4.5 G: 4; .5 INJECTION, POWDER, FOR SOLUTION INTRAVENOUS at 04:08

## 2024-08-29 RX ADMIN — METOPROLOL SUCCINATE 25 MG: 25 TABLET, EXTENDED RELEASE ORAL at 09:08

## 2024-08-29 RX ADMIN — PANTOPRAZOLE SODIUM 40 MG: 20 TABLET, DELAYED RELEASE ORAL at 09:08

## 2024-08-29 RX ADMIN — PIPERACILLIN SODIUM AND TAZOBACTAM SODIUM 4.5 G: 4; .5 INJECTION, POWDER, FOR SOLUTION INTRAVENOUS at 09:08

## 2024-08-29 RX ADMIN — MIRTAZAPINE 30 MG: 7.5 TABLET, FILM COATED ORAL at 09:08

## 2024-08-29 RX ADMIN — SENNOSIDES 8.6 MG: 8.6 TABLET, FILM COATED ORAL at 08:08

## 2024-08-29 RX ADMIN — ATORVASTATIN CALCIUM 80 MG: 40 TABLET, FILM COATED ORAL at 08:08

## 2024-08-29 RX ADMIN — LOSARTAN POTASSIUM 100 MG: 50 TABLET, FILM COATED ORAL at 08:08

## 2024-08-29 NOTE — PLAN OF CARE
POC updated and reviewed with the patient at the bedside. Questions regarding POC were encouraged and addressed. VSS, see flowsheets. Patient is AOX 4 at this time. Tele maintained per order. Fall and safety precautions maintained, no signs of injury noted during shift. Patient repositioned independently in bed for comfort. Upon exiting room, patient's bed locked in low position, side rails up x 2, bed alarm on, with call light within reach. Pt is self ambulatory. No acute signs of distress noted at this time.     -Fiona Chamorro    Problem: Adult Inpatient Plan of Care  Goal: Plan of Care Review  Outcome: Progressing  Goal: Absence of Hospital-Acquired Illness or Injury  Outcome: Progressing  Goal: Optimal Comfort and Wellbeing  Outcome: Progressing  Goal: Readiness for Transition of Care  Outcome: Progressing     Problem: Wound  Goal: Optimal Coping  Outcome: Progressing  Goal: Improved Oral Intake  Outcome: Progressing  Goal: Optimal Pain Control and Function  Outcome: Progressing  Goal: Skin Health and Integrity  Outcome: Progressing     Problem: Fall Injury Risk  Goal: Absence of Fall and Fall-Related Injury  Outcome: Progressing     Problem: Anxiety  Goal: Anxiety Reduction or Resolution  Outcome: Progressing     Problem: Pain Acute  Goal: Optimal Pain Control and Function  Outcome: Progressing

## 2024-08-29 NOTE — CARE UPDATE
I have reviewed the chart of Adam Persaud who is hospitalized for the following:    Active Hospital Problems    Diagnosis    *Calculus of gallbladder with acute cholecystitis without obstruction    Hypokalemia     Monitor with daily cmp  replace      Constipation    Bile leak    Calculus of gallbladder and bile duct with acute on chronic cholecystitis without obstruction    Nicotine dependence     [place] on cessation      Pain     Prn pain medicine      History of coronary artery bypass graft    Diarrhea     C/o   On stool softeners  Rule out cdiff on admission      S/P AAA (abdominal aortic aneurysm) repair    CAD (coronary artery disease)    BPH (benign prostatic hyperplasia)    ACP (advance care planning)    Prediabetes    Gout    Insomnia     On trazadone      Lumbar spondylosis    HTN (hypertension)    HLD (hyperlipidemia)        Olivia Cruz NP  [facility]

## 2024-08-30 VITALS
OXYGEN SATURATION: 97 % | BODY MASS INDEX: 26.97 KG/M2 | HEART RATE: 67 BPM | SYSTOLIC BLOOD PRESSURE: 128 MMHG | WEIGHT: 177.94 LBS | TEMPERATURE: 98 F | RESPIRATION RATE: 18 BRPM | HEIGHT: 68 IN | DIASTOLIC BLOOD PRESSURE: 74 MMHG

## 2024-08-30 LAB
ANION GAP SERPL CALC-SCNC: 9 MMOL/L (ref 8–16)
BASOPHILS # BLD AUTO: 0.05 K/UL (ref 0–0.2)
BASOPHILS NFR BLD: 0.6 % (ref 0–1.9)
BUN SERPL-MCNC: 10 MG/DL (ref 8–23)
CALCIUM SERPL-MCNC: 8.9 MG/DL (ref 8.7–10.5)
CHLORIDE SERPL-SCNC: 109 MMOL/L (ref 95–110)
CO2 SERPL-SCNC: 23 MMOL/L (ref 23–29)
CREAT SERPL-MCNC: 0.7 MG/DL (ref 0.5–1.4)
DIFFERENTIAL METHOD BLD: ABNORMAL
EOSINOPHIL # BLD AUTO: 0.1 K/UL (ref 0–0.5)
EOSINOPHIL NFR BLD: 1.7 % (ref 0–8)
ERYTHROCYTE [DISTWIDTH] IN BLOOD BY AUTOMATED COUNT: 15 % (ref 11.5–14.5)
EST. GFR  (NO RACE VARIABLE): >60 ML/MIN/1.73 M^2
GLUCOSE SERPL-MCNC: 111 MG/DL (ref 70–110)
HCT VFR BLD AUTO: 36.4 % (ref 40–54)
HGB BLD-MCNC: 11.6 G/DL (ref 14–18)
IMM GRANULOCYTES # BLD AUTO: 0.04 K/UL (ref 0–0.04)
IMM GRANULOCYTES NFR BLD AUTO: 0.5 % (ref 0–0.5)
LIPASE SERPL-CCNC: 24 U/L (ref 4–60)
LYMPHOCYTES # BLD AUTO: 2.3 K/UL (ref 1–4.8)
LYMPHOCYTES NFR BLD: 27.6 % (ref 18–48)
MAGNESIUM SERPL-MCNC: 1.9 MG/DL (ref 1.6–2.6)
MCH RBC QN AUTO: 26.5 PG (ref 27–31)
MCHC RBC AUTO-ENTMCNC: 31.9 G/DL (ref 32–36)
MCV RBC AUTO: 83 FL (ref 82–98)
MONOCYTES # BLD AUTO: 0.8 K/UL (ref 0.3–1)
MONOCYTES NFR BLD: 9.6 % (ref 4–15)
NEUTROPHILS # BLD AUTO: 4.9 K/UL (ref 1.8–7.7)
NEUTROPHILS NFR BLD: 60 % (ref 38–73)
NRBC BLD-RTO: 0 /100 WBC
PLATELET # BLD AUTO: 326 K/UL (ref 150–450)
PMV BLD AUTO: 11.1 FL (ref 9.2–12.9)
POTASSIUM SERPL-SCNC: 3.5 MMOL/L (ref 3.5–5.1)
RBC # BLD AUTO: 4.37 M/UL (ref 4.6–6.2)
SODIUM SERPL-SCNC: 141 MMOL/L (ref 136–145)
WBC # BLD AUTO: 8.23 K/UL (ref 3.9–12.7)

## 2024-08-30 PROCEDURE — 25000003 PHARM REV CODE 250: Performed by: STUDENT IN AN ORGANIZED HEALTH CARE EDUCATION/TRAINING PROGRAM

## 2024-08-30 PROCEDURE — 85025 COMPLETE CBC W/AUTO DIFF WBC: CPT | Performed by: STUDENT IN AN ORGANIZED HEALTH CARE EDUCATION/TRAINING PROGRAM

## 2024-08-30 PROCEDURE — 63600175 PHARM REV CODE 636 W HCPCS: Performed by: INTERNAL MEDICINE

## 2024-08-30 PROCEDURE — 36415 COLL VENOUS BLD VENIPUNCTURE: CPT | Performed by: STUDENT IN AN ORGANIZED HEALTH CARE EDUCATION/TRAINING PROGRAM

## 2024-08-30 PROCEDURE — 83735 ASSAY OF MAGNESIUM: CPT | Performed by: STUDENT IN AN ORGANIZED HEALTH CARE EDUCATION/TRAINING PROGRAM

## 2024-08-30 PROCEDURE — 83690 ASSAY OF LIPASE: CPT | Performed by: STUDENT IN AN ORGANIZED HEALTH CARE EDUCATION/TRAINING PROGRAM

## 2024-08-30 PROCEDURE — 25000003 PHARM REV CODE 250: Performed by: INTERNAL MEDICINE

## 2024-08-30 PROCEDURE — S4991 NICOTINE PATCH NONLEGEND: HCPCS | Performed by: STUDENT IN AN ORGANIZED HEALTH CARE EDUCATION/TRAINING PROGRAM

## 2024-08-30 PROCEDURE — 63600175 PHARM REV CODE 636 W HCPCS: Performed by: STUDENT IN AN ORGANIZED HEALTH CARE EDUCATION/TRAINING PROGRAM

## 2024-08-30 PROCEDURE — 80048 BASIC METABOLIC PNL TOTAL CA: CPT | Performed by: STUDENT IN AN ORGANIZED HEALTH CARE EDUCATION/TRAINING PROGRAM

## 2024-08-30 RX ORDER — IBUPROFEN 200 MG
1 TABLET ORAL DAILY
Start: 2024-08-31

## 2024-08-30 RX ORDER — METOCLOPRAMIDE 10 MG/1
10 TABLET ORAL
Start: 2024-08-30

## 2024-08-30 RX ORDER — GABAPENTIN 400 MG/1
800 CAPSULE ORAL NIGHTLY
Start: 2024-08-30 | End: 2025-08-30

## 2024-08-30 RX ADMIN — HYDROCODONE BITARTRATE AND ACETAMINOPHEN 1 TABLET: 10; 325 TABLET ORAL at 08:08

## 2024-08-30 RX ADMIN — HYDROMORPHONE HYDROCHLORIDE 1 MG: 1 INJECTION, SOLUTION INTRAMUSCULAR; INTRAVENOUS; SUBCUTANEOUS at 10:08

## 2024-08-30 RX ADMIN — Medication 1 PATCH: at 08:08

## 2024-08-30 RX ADMIN — PANTOPRAZOLE SODIUM 40 MG: 20 TABLET, DELAYED RELEASE ORAL at 08:08

## 2024-08-30 RX ADMIN — METOCLOPRAMIDE 10 MG: 5 TABLET ORAL at 04:08

## 2024-08-30 RX ADMIN — HYDROMORPHONE HYDROCHLORIDE 1 MG: 1 INJECTION, SOLUTION INTRAMUSCULAR; INTRAVENOUS; SUBCUTANEOUS at 04:08

## 2024-08-30 RX ADMIN — METOCLOPRAMIDE 10 MG: 5 TABLET ORAL at 12:08

## 2024-08-30 RX ADMIN — ALLOPURINOL 300 MG: 100 TABLET ORAL at 08:08

## 2024-08-30 RX ADMIN — HYDROCODONE BITARTRATE AND ACETAMINOPHEN 1 TABLET: 10; 325 TABLET ORAL at 04:08

## 2024-08-30 RX ADMIN — PIPERACILLIN SODIUM AND TAZOBACTAM SODIUM 4.5 G: 4; .5 INJECTION, POWDER, FOR SOLUTION INTRAVENOUS at 04:08

## 2024-08-30 RX ADMIN — SENNOSIDES 8.6 MG: 8.6 TABLET, FILM COATED ORAL at 08:08

## 2024-08-30 RX ADMIN — AMLODIPINE BESYLATE 10 MG: 10 TABLET ORAL at 08:08

## 2024-08-30 RX ADMIN — ATORVASTATIN CALCIUM 80 MG: 40 TABLET, FILM COATED ORAL at 08:08

## 2024-08-30 RX ADMIN — LOSARTAN POTASSIUM 100 MG: 50 TABLET, FILM COATED ORAL at 08:08

## 2024-08-30 NOTE — ASSESSMENT & PLAN NOTE
S/p stent with AES, recommend f/u ERCP 6 weeks   - monitor drain output- discharge with drain per surgery with outpt f/u

## 2024-08-30 NOTE — ASSESSMENT & PLAN NOTE
S/p open noe 8/19, complicated by necrosis and adherence to bowel wall with 2100cc blood loss  HIDA 8/23 concerning for bile leak/CBD obstruction   ERCP 8/23, unable to cannulate the bile duct  Transferred to [facility] for AES evaluation  Continue Zosyn   AES consulted  Lipase normal  Pain control     s/p ERCP 8/26 which identified choledocholithiasis with distal CBD stone/sludge, an obvious bile leak was not observed. One temp stent placed into CBD and one temp stent placed into pancreatic duct  Cont zosyn while inpatient, [facility]   Continue to monitor output from RUQ drain.   -Consult gen surgery for assistance/recs - recommend discharge once output decreases/becomes serous- output is clearing  -Resume Plavix 8/31  -Repeat ERCP in 6 weeks to remove CBD stent. The pancreatic stent should pass spontaneously.  Monitor for pancreatitis, bleeding, perforation, and cholangitis   - still requiring IV pain meds- transfer back [place] for [place] care

## 2024-08-30 NOTE — ASSESSMENT & PLAN NOTE
- acute on chronic pain   - follows with pain management  - cont MM pain regimen  - still requiring IV pain meds, requesting transfer back to [facility] to be closer to home/family- talked to transfer center- will likely need a few more days of pain management

## 2024-08-30 NOTE — ASSESSMENT & PLAN NOTE
Chronic, controlled. Latest blood pressure and vitals reviewed-     Temp:  [97.6 °F (36.4 °C)-98.2 °F (36.8 °C)]   Pulse:  [47-85]   Resp:  [16-20]   BP: (106-143)/(59-86)   SpO2:  [95 %-98 %] .   Home meds for hypertension were reviewed and noted below.   Hypertension Medications               amLODIPine (NORVASC) 10 MG tablet Take 10 mg by mouth once daily.    losartan (COZAAR) 100 MG tablet Take 100 mg by mouth once daily.    metoprolol succinate (TOPROL-XL) 25 MG 24 hr tablet Take 25 mg by mouth every evening.    nitroGLYCERIN (NITROSTAT) 0.4 MG SL tablet Place 0.4 mg under the tongue every 5 (five) minutes as needed for Chest pain.            While in the hospital, will manage blood pressure as follows; Continue home antihypertensive regimen    Will utilize p.r.n. blood pressure medication only if patient's blood pressure greater than 180/110 and he develops symptoms such as worsening chest pain or shortness of breath.

## 2024-08-30 NOTE — SUBJECTIVE & OBJECTIVE
Interval History: Still reporting abd pain and requiring IV pain meds. Requesting transfer back to [facility] to be closer to home, transfer center made aware. Drain output still appears dark.     Review of Systems   Gastrointestinal:  Positive for abdominal pain.     Objective:     Vital Signs (Most Recent):  Temp: 98 °F (36.7 °C) (08/29/24 1626)  Pulse: 80 (08/29/24 1626)  Resp: 16 (08/29/24 1802)  BP: 136/75 (08/29/24 1626)  SpO2: 96 % (08/29/24 1626) Vital Signs (24h Range):  Temp:  [97.6 °F (36.4 °C)-98.2 °F (36.8 °C)] 98 °F (36.7 °C)  Pulse:  [47-85] 80  Resp:  [16-20] 16  SpO2:  [95 %-98 %] 96 %  BP: (106-143)/(59-86) 136/75     Weight: 80.7 kg (177 lb 14.6 oz)  Body mass index is 27.05 kg/m².    Intake/Output Summary (Last 24 hours) at 8/29/2024 2002  Last data filed at 8/28/2024 2242  Gross per 24 hour   Intake --   Output 25 ml   Net -25 ml         Physical Exam  Constitutional:       General: He is not in acute distress.     Appearance: He is not toxic-appearing.   HENT:      Head: Normocephalic and atraumatic.      Nose: Nose normal.      Mouth/Throat:      Mouth: Mucous membranes are moist.   Eyes:      Conjunctiva/sclera: Conjunctivae normal.      Pupils: Pupils are equal, round, and reactive to light.   Cardiovascular:      Rate and Rhythm: Normal rate and regular rhythm.      Heart sounds: Normal heart sounds.   Pulmonary:      Effort: Pulmonary effort is normal. No respiratory distress.      Breath sounds: Normal breath sounds. No wheezing or rales.   Abdominal:      General: Bowel sounds are normal. There is distension.      Palpations: Abdomen is soft.      Tenderness: There is abdominal tenderness.      Comments: MONIQUE drain with bilious output but clearing. Abdominal incisions with dressing in place CDI   Musculoskeletal:         General: Normal range of motion.      Cervical back: Normal range of motion and neck supple.      Right lower leg: No edema.      Left lower leg: No edema.   Skin:      General: Skin is warm and dry.   Neurological:      General: No focal deficit present.      Mental Status: He is alert and oriented to person, place, and time. Mental status is at baseline.   Psychiatric:         Mood and Affect: Mood normal.         Behavior: Behavior normal.             Significant Labs: All pertinent labs within the past 24 hours have been reviewed.    Significant Imaging: I have reviewed all pertinent imaging results/findings within the past 24 hours.

## 2024-08-30 NOTE — ASSESSMENT & PLAN NOTE
Chronic, controlled. Latest blood pressure and vitals reviewed-     Temp:  [97.7 °F (36.5 °C)-98 °F (36.7 °C)]   Pulse:  [49-80]   Resp:  [14-18]   BP: (108-136)/(66-84)   SpO2:  [93 %-99 %] .   Home meds for hypertension were reviewed and noted below.   Hypertension Medications               amLODIPine (NORVASC) 10 MG tablet Take 10 mg by mouth once daily.    losartan (COZAAR) 100 MG tablet Take 100 mg by mouth once daily.    metoprolol succinate (TOPROL-XL) 25 MG 24 hr tablet Take 25 mg by mouth every evening.    nitroGLYCERIN (NITROSTAT) 0.4 MG SL tablet Place 0.4 mg under the tongue every 5 (five) minutes as needed for Chest pain.            While in the hospital, will manage blood pressure as follows; Continue home antihypertensive regimen    Will utilize p.r.n. blood pressure medication only if patient's blood pressure greater than 180/110 and he develops symptoms such as worsening chest pain or shortness of breath.

## 2024-08-30 NOTE — ASSESSMENT & PLAN NOTE
Patient's most recent potassium results are listed below.   Recent Labs     08/27/24  0529 08/28/24  0953 08/29/24  0645   K 3.8 3.5 3.4*     Plan  - Replete potassium per protocol  - Monitor potassium Daily  - Patient's hypokalemia is worsening. Will continue current treatment

## 2024-08-30 NOTE — PROGRESS NOTES
[facility] - Neurosurgery ([facility])  [facility] Medicine  Progress Note    Patient Name: Adam Persaud  MRN: [id number]  Patient Class: IP- Inpatient   Admission Date: 8/24/2024  Length of Stay: 5 days  Attending Physician: Briana Yen MD  Primary Care Provider: Liv Dai MD        Subjective:     Principal Problem:Calculus of gallbladder with acute cholecystitis without obstruction        HPI:  63M with PMH of CAD s/p CABG, chronic back pain, gout, HTN, HLD, endovascular aortic aneurysm repair (July 2024) admitted to [facility] on 8/16 for acute cholecystitis which was confirmed with imaging. General surgery performed cholecystectomy with MONIQUE drain placement on 8/19, laparoscopic cholecystectomy was converted to an open cholecystectomy as procedure was complicated by gallbladder necrosis and adherence to bowels with approximately 2100 mL estimated blood loss. He was monitored in the ICU post-operatively, but has subsequently stepped down to a floor bed. Abdominal pain persisted postoperatively requiring PCA. General Surgery noted MONIQUE drain had bilious drainage on 8/21. Gastroenterology evaluated patient for concern of bile duct leak. HIDA scan on 8/23 had concern for bile leak into the peritoneal cavity/MONIQUE drain. He underwent ERCP on 8/23 but unable to cannulate the bile duct. Referring team spoke with AES at [facility] and patient subsequently transferred for AES evaluation. Patient currently HDS and pain well controlled off PCA. Labwork today: Sodium 139, potassium 3.7, chloride 103, CO2 28, BUN 7, creatinine 0.65, glucose 96, total bilirubin 1, AST 24, ALT 15, white blood cells 6.43, hemoglobin 10.4, hematocrit 31.7, platelets 331. He will be admitted to hospital medicine service for further management.     Overview/Hospital Course:  63M with PMH of CAD s/p CABG, chronic back pain, gout, HTN, HLD, endovascular aortic aneurysm repair (July 2024) admitted to [facility] on 8/16 for acute cholecystitis. S/p open  noe 8/19 at [facility], complicated by necrosis and adherence to bowel wall with 2100cc blood loss. HIDA 8/23 concerning for bile leak/CBD obstruction. ERCP 8/23, unable to cannulate the bile duct. Transferred to [facility] for AES evaluation. s/p ERCP which identified choledocholithiasis with distal CBD stone/sludge, an obvious bile leak was not observed. One temp stent placed into CBD and one temp stent placed into pancreatic duct. Cont zosyn while inpatient, can transition to cipro on discharge to complete 5 day course. Continue to monitor output from RUQ drain. Consult gen surgery for assistance/recs, recommend discharge when drain fluid serous. Patient still requiring IV pain meds. Requesting transfer back to [facility] to be closer to family/home, transfer center notified.     Interval History: Still reporting abd pain and requiring IV pain meds. Requesting transfer back to [facility] to be closer to home, transfer center made aware. Drain output still appears dark.     Review of Systems   Gastrointestinal:  Positive for abdominal pain.     Objective:     Vital Signs (Most Recent):  Temp: 98 °F (36.7 °C) (08/29/24 1626)  Pulse: 80 (08/29/24 1626)  Resp: 16 (08/29/24 1802)  BP: 136/75 (08/29/24 1626)  SpO2: 96 % (08/29/24 1626) Vital Signs (24h Range):  Temp:  [97.6 °F (36.4 °C)-98.2 °F (36.8 °C)] 98 °F (36.7 °C)  Pulse:  [47-85] 80  Resp:  [16-20] 16  SpO2:  [95 %-98 %] 96 %  BP: (106-143)/(59-86) 136/75     Weight: 80.7 kg (177 lb 14.6 oz)  Body mass index is 27.05 kg/m².    Intake/Output Summary (Last 24 hours) at 8/29/2024 2002  Last data filed at 8/28/2024 2242  Gross per 24 hour   Intake --   Output 25 ml   Net -25 ml         Physical Exam  Constitutional:       General: He is not in acute distress.     Appearance: He is not toxic-appearing.   HENT:      Head: Normocephalic and atraumatic.      Nose: Nose normal.      Mouth/Throat:      Mouth: Mucous membranes are moist.   Eyes:      Conjunctiva/sclera:  Conjunctivae normal.      Pupils: Pupils are equal, round, and reactive to light.   Cardiovascular:      Rate and Rhythm: Normal rate and regular rhythm.      Heart sounds: Normal heart sounds.   Pulmonary:      Effort: Pulmonary effort is normal. No respiratory distress.      Breath sounds: Normal breath sounds. No wheezing or rales.   Abdominal:      General: Bowel sounds are normal. There is distension.      Palpations: Abdomen is soft.      Tenderness: There is abdominal tenderness.      Comments: MONIQUE drain with bilious output but clearing. Abdominal incisions with dressing in place CDI   Musculoskeletal:         General: Normal range of motion.      Cervical back: Normal range of motion and neck supple.      Right lower leg: No edema.      Left lower leg: No edema.   Skin:     General: Skin is warm and dry.   Neurological:      General: No focal deficit present.      Mental Status: He is alert and oriented to person, place, and time. Mental status is at baseline.   Psychiatric:         Mood and Affect: Mood normal.         Behavior: Behavior normal.             Significant Labs: All pertinent labs within the past 24 hours have been reviewed.    Significant Imaging: I have reviewed all pertinent imaging results/findings within the past 24 hours.    Assessment/Plan:      * Calculus of gallbladder with acute cholecystitis without obstruction  S/p open noe 8/19, complicated by necrosis and adherence to bowel wall with 2100cc blood loss  HIDA 8/23 concerning for bile leak/CBD obstruction   ERCP 8/23, unable to cannulate the bile duct  [place] [facility] [place] for AES evaluation  Continue Zosyn   AES consulted  Lipase normal  Pain control     s/p ERCP 8/26 which identified choledocholithiasis with distal CBD stone/sludge, an obvious bile leak was not observed. One temp stent placed into CBD and one temp stent placed into pancreatic duct  Cont zosyn while inpatient, will transition to cipro on discharge to  complete 5 day course day 4/5 today  Continue to monitor output from RUQ drain.   -Consult gen surgery for assistance/recs - recommend discharge once output decreases/becomes serous- output is clearing  -Resume Plavix 8/31  -Repeat ERCP in 6 weeks to remove CBD stent. The pancreatic stent should pass spontaneously.  Monitor for pancreatitis, bleeding, perforation, and cholangitis   - still requiring IV pain meds    Hypokalemia  Patient's most recent potassium results are listed below.   Recent Labs     08/27/24  0529 08/28/24  0953 08/29/24  0645   K 3.8 3.5 3.4*     Plan  - Replete potassium per protocol  - Monitor potassium Daily  - Patient's hypokalemia is worsening. Will continue current treatment      Constipation    Multifactorial iso abdominal surgery and opiates  Wean as able - currently pain not well controlled - possibly constipation is also contributing to pian   Bowel regimen BID   Monitor    Bile leak  S/p stent with AES, recommend f/u ERCP 6 weeks   - monitor drain output- discharge with drain per surgery with outpt f/u      Diarrhea  Stop stool softeners   Improved      History of coronary artery bypass graft  - noted      Pain  - acute on chronic pain   - follows with pain management  - cont MM pain regimen  - still requiring IV pain meds, requesting transfer back to [facility] to be closer to home/family- talked to transfer center- will likely need a few more days of pain management       Nicotine dependence  Dangers of cigarette smoking were reviewed with patient in detail. Patient was Counseled for 3-10 minutes. Nicotine replacement options were discussed. Nicotine replacement was discussed- prescribed    S/P AAA (abdominal aortic aneurysm) repair  Known AAA atherosclerotic arterial disease  8/16 CTA aorto bi-iliac stent appears in satisfactory position with no stenosis or extravasation of the stent, no stenosis  Continue beta blocker and BP control       ACP (advance care planning)  Full  code      BPH (benign prostatic hyperplasia)  Cont tamsulosin      Gout  Cont allopurinol      Prediabetes  Hemoglobin A1c of 5.7        Insomnia  - trazodone      CAD (coronary artery disease)  ASA/plavix held for procedure- resume 8/31    Lumbar spondylosis  Pain control      HLD (hyperlipidemia)  Cont statin      HTN (hypertension)  Chronic, controlled. Latest blood pressure and vitals reviewed-     Temp:  [97.6 °F (36.4 °C)-98.2 °F (36.8 °C)]   Pulse:  [47-85]   Resp:  [16-20]   BP: (106-143)/(59-86)   SpO2:  [95 %-98 %] .   Home meds for hypertension were reviewed and noted below.   Hypertension Medications               amLODIPine (NORVASC) 10 MG tablet Take 10 mg by mouth once daily.    losartan (COZAAR) 100 MG tablet Take 100 mg by mouth once daily.    metoprolol succinate (TOPROL-XL) 25 MG 24 hr tablet Take 25 mg by mouth every evening.    nitroGLYCERIN (NITROSTAT) 0.4 MG SL tablet Place 0.4 mg under the tongue every 5 (five) minutes as needed for Chest pain.            While in the hospital, will manage blood pressure as follows; Continue home antihypertensive regimen    Will utilize p.r.n. blood pressure medication only if patient's blood pressure greater than 180/110 and he develops symptoms such as worsening chest pain or shortness of breath.      VTE Risk Mitigation (From admission, onward)           Ordered     Reason for No Pharmacological VTE Prophylaxis  Once        Question:  Reasons:  Answer:  Risk of Bleeding    08/24/24 1525     IP VTE HIGH RISK PATIENT  Once         08/24/24 1525                    Discharge Planning   TIM: 9/1/2024     Code Status: Full Code   Is the patient medically ready for discharge?:     Reason for patient still in hospital (select all that apply): Patient trending condition and Other (specify) Patient still requiring pain meds IV, requesting transfer back to [facility] notified  Discharge Plan A: Home                  Briana Yen MD  Department of  [facility] Medicine   [facility] - Neurosurgery ([facility])

## 2024-08-30 NOTE — NURSING
Pt transferred to [facility]. Report called to RN for med/surg Rm [id number]. Pt transported via stretcher/ambulance. IV & tele box removed per d/c order.

## 2024-08-30 NOTE — DISCHARGE SUMMARY
[facility] - Neurosurgery ([facility])  [facility] Medicine  Discharge Summary      Patient Name: Adam Persaud  MRN: [id number]  SIMRAN: [id number]  Patient Class: IP- Inpatient  Admission Date: 8/24/2024  Hospital Length of Stay: 6 days  Discharge Date and Time: 8/30/2024  1:10 PM  Attending Physician: Chanda att. providers found   Discharging Provider: Briana Yen MD  Primary Care Provider: Liv Dai MD  Hospital Medicine Team: [facility] [place] [facility]OSP MED  Briana Yen MD  Primary Care Team: [facility] [place] [place] HOSP [facility]    HPI:   63M with PMH of CAD s/p CABG, chronic back pain, gout, HTN, HLD, endovascular aortic aneurysm repair (July 2024) admitted to [facility] on 8/16 for acute cholecystitis which was confirmed with imaging. General surgery performed cholecystectomy with MONIQUE drain placement on 8/19, laparoscopic cholecystectomy was converted to an open cholecystectomy as procedure was complicated by gallbladder necrosis and adherence to bowels with approximately 2100 mL estimated blood loss. He was monitored in the ICU post-operatively, but has subsequently stepped down to a floor bed. Abdominal pain persisted postoperatively requiring PCA. General Surgery noted MONIQUE drain had bilious drainage on 8/21. Gastroenterology evaluated patient for concern of bile duct leak. HIDA scan on 8/23 had concern for bile leak into the peritoneal cavity/MONIQUE drain. He underwent ERCP on 8/23 but unable to cannulate the bile duct. Referring team spoke with AES at [facility] and patient subsequently transferred for AES evaluation. Patient currently HDS and pain well controlled off PCA. Labwork today: Sodium 139, potassium 3.7, chloride 103, CO2 28, BUN 7, creatinine 0.65, glucose 96, total bilirubin 1, AST 24, ALT 15, white blood cells 6.43, hemoglobin 10.4, hematocrit 31.7, platelets 331. He will be admitted to hospital medicine service for further management.     Procedure(s) (LRB):  ERCP (ENDOSCOPIC RETROGRADE CHOLANGIOPANCREATOGRAPHY) (N/A)      Hospital  Course:   63M with PMH of CAD s/p CABG, chronic back pain, gout, HTN, HLD, endovascular aortic aneurysm repair (July 2024) admitted to [facility] on 8/16 for acute cholecystitis. S/p open noe 8/19 at [facility], complicated by necrosis and adherence to bowel wall with 2100cc blood loss. HIDA 8/23 concerning for bile leak/CBD obstruction. ERCP 8/23, unable to cannulate the bile duct. [place] [facility] [place] for AES evaluation. s/p ERCP which identified choledocholithiasis with distal CBD stone/sludge, an obvious bile leak was not observed. One temp stent placed into CBD and one temp stent placed into pancreatic duct. Cont zosyn while inpatient, can transition to [facility] on discharge to complete 5 day course. Continue to monitor output from RUQ drain. Consult gen surgery for assistance/recs, recommend discharge with drain and f/u outpt. Patient still requiring IV pain meds. Requesting transfer back to [facility] to be closer to family/home, transfer center notified. Accepted back to [facility], stable for transfer for continued care.      Goals of Care Treatment Preferences:  Code Status: Full Code      SDOH Screening:  The patient was screened for utility difficulties, food insecurity, transport difficulties, housing insecurity, and interpersonal safety and there were no concerns identified this admission.     Consults:   Consults (From admission, onward)          Status Ordering Provider     Inpatient consult to General Surgery  Once        Provider:  (Not yet assigned)    Completed MARCELINA WYATT     Inpatient consult to [facility] (AES)  Once        Provider:  (Not yet assigned)    Completed MARCELINA WYATT            Neuro  Lumbar spondylosis  Pain control      Cardiac/Vascular  History of coronary artery bypass graft  - noted      S/P AAA (abdominal aortic aneurysm) repair  Known AAA atherosclerotic arterial disease  8/16 CTA aorto bi-iliac stent appears in satisfactory position with no stenosis or  extravasation of the stent, no stenosis  Continue beta blocker and BP control       CAD (coronary artery disease)  ASA/plavix held for procedure- resume 8/31    HLD (hyperlipidemia)  Cont statin      HTN (hypertension)  Chronic, controlled. Latest blood pressure and vitals reviewed-     Temp:  [97.7 °F (36.5 °C)-98 °F (36.7 °C)]   Pulse:  [49-80]   Resp:  [14-18]   BP: (108-136)/(66-84)   SpO2:  [93 %-99 %] .   Home meds for hypertension were reviewed and noted below.   Hypertension Medications               amLODIPine (NORVASC) 10 MG tablet Take 10 mg by mouth once daily.    losartan (COZAAR) 100 MG tablet Take 100 mg by mouth once daily.    metoprolol succinate (TOPROL-XL) 25 MG 24 hr tablet Take 25 mg by mouth every evening.    nitroGLYCERIN (NITROSTAT) 0.4 MG SL tablet Place 0.4 mg under the tongue every 5 (five) minutes as needed for Chest pain.            While in the hospital, will manage blood pressure as follows; Continue home antihypertensive regimen    Will utilize p.r.n. blood pressure medication only if patient's blood pressure greater than 180/110 and he develops symptoms such as worsening chest pain or shortness of breath.    Renal/  Hypokalemia  Patient's most recent potassium results are listed below.   Recent Labs     08/28/24  0953 08/29/24  0645 08/30/24  0437   K 3.5 3.4* 3.5       Plan  - Replete potassium per protocol  - Monitor potassium Daily  - Patient's hypokalemia is resolved      BPH (benign prostatic hyperplasia)  Cont tamsulosin      Endocrine  Prediabetes  Hemoglobin A1c of 5.7        GI  * Calculus of gallbladder with acute cholecystitis without obstruction  S/p open noe 8/19, complicated by necrosis and adherence to bowel wall with 2100cc blood loss  HIDA 8/23 concerning for bile leak/CBD obstruction   ERCP 8/23, unable to cannulate the bile duct  Transferred [place] [facility] [place] for AES evaluation  Continue Zosyn   AES consulted  Lipase normal  Pain control     s/p ERCP 8/26  which identified choledocholithiasis with distal CBD stone/sludge, an obvious bile leak was not observed. One temp stent placed into CBD and one temp stent placed into pancreatic duct  Cont zosyn while inpatient, will transition to cipro on discharge to complete 5 day course   Continue to monitor output from RUQ drain.   -Consult gen surgery for assistance/recs - recommend discharge once output decreases/becomes serous- output is clearing  -Resume Plavix 8/31  -Repeat ERCP in 6 weeks to remove CBD stent. The pancreatic stent should pass spontaneously.  Monitor for pancreatitis, bleeding, perforation, and cholangitis   - still requiring IV pain meds- transfer back to [facility] for continued care    Constipation    Multifactorial iso abdominal surgery and opiates  Wean as able - currently pain not well controlled - possibly constipation is also contributing to pian   Bowel regimen BID   Monitor    Bile leak  S/p stent with AES, recommend f/u ERCP 6 weeks   - monitor drain output- discharge with drain per surgery with outpt f/u      Diarrhea  Stop stool softeners   Improved      Orthopedic  Gout  Cont allopurinol      Palliative Care  ACP (advance care planning)  Full code      Other  Pain  - acute on chronic pain   - follows with pain management  - cont MM pain regimen  - still requiring IV pain meds, requesting transfer back to [facility] to be closer to home/family- talked to transfer center- will likely need a few more days of pain management - accepted and stable for transfer      Nicotine dependence  Dangers of cigarette smoking were reviewed with patient in detail. Patient was Counseled for 3-10 minutes. Nicotine replacement options were discussed. Nicotine replacement was discussed- prescribed    Insomnia  - trazodone        Final Active Diagnoses:    Diagnosis Date Noted POA    PRINCIPAL PROBLEM:  Calculus of gallbladder with acute cholecystitis without obstruction [K80.00] 08/16/2024 Yes    Hypokalemia  [E87.6] 08/29/2024 No    Constipation [K59.00] 08/28/2024 Yes    Bile leak [K83.9] 08/27/2024 Yes    Nicotine dependence [F17.200] 08/25/2024 Yes    Pain [R52] 08/25/2024 Yes    History of coronary artery bypass graft [Z95.1] 08/25/2024 Not Applicable    Diarrhea [R19.7] 08/25/2024 Yes    S/P AAA (abdominal aortic aneurysm) repair [Z98.890, Z86.79] 08/16/2024 Not Applicable    CAD (coronary artery disease) [I25.10] 07/08/2024 Yes    BPH (benign prostatic hyperplasia) [N40.0] 07/08/2024 Yes    ACP (advance care planning) [Z71.89] 07/08/2024 Not Applicable    Prediabetes [R73.03] 07/08/2024 Yes    Gout [M10.9] 07/08/2024 Yes    Insomnia [G47.00] 07/08/2024 Yes    Lumbar spondylosis [M47.816] 03/29/2018 Yes    HTN (hypertension) [I10] 06/23/2014 Yes    HLD (hyperlipidemia) [E78.5] 06/23/2014 Yes      Problems Resolved During this Admission:    Diagnosis Date Noted Date Resolved POA    Calculus of gallbladder and bile duct with acute on chronic cholecystitis without obstruction [K80.66] 08/27/2024 08/29/2024 Yes       Discharged Condition: stable    Disposition: Another Health Care Inst*    Follow Up:   Follow-up Information       Liv Dai MD. Schedule an appointment as soon as possible for a visit in 1 week(s).    Specialty: Internal Medicine  Contact information:  [address]  [place] [address]  [phone]                           Patient Instructions:      Diet Cardiac     Notify your health care provider if you experience any of the following:  temperature >100.4     Notify your health care provider if you experience any of the following:  persistent nausea and vomiting or diarrhea     Notify your health care provider if you experience any of the following:  severe uncontrolled pain     Notify your health care provider if you experience any of the following:  redness, tenderness, or signs of infection (pain, swelling, redness, odor or green/yellow discharge around incision site)     Notify your health  care provider if you experience any of the following:  difficulty breathing or increased cough     Notify your health care provider if you experience any of the following:  persistent dizziness, light-headedness, or visual disturbances     Notify your health care provider if you experience any of the following:  increased confusion or weakness     Reason for not Ordering Smoking Cessation Referral     Order Specific Question Answer Comments   Reason for not ordering: Not medically appropriate at this time      Reason for not Prescribing Nicotine Replacement     Order Specific Question Answer Comments   Reason for not Prescribing: Not medically appropriate at this time      Activity as tolerated       Significant Diagnostic Studies: Labs: All labs within the past 24 hours have been reviewed    Pending Diagnostic Studies:       None           Medications:  Reconciled Home Medications:      Medication List        START taking these medications      gabapentin 400 MG capsule  Commonly known as: NEURONTIN  Take 2 capsules (800 mg total) by mouth every evening.     metoclopramide HCl 10 MG tablet  Commonly known as: REGLAN  Take 1 tablet (10 mg total) by mouth 3 (three) times daily before meals.     nicotine 21 mg/24 hr  Commonly known as: NICODERM CQ  Place 1 patch onto the skin once daily.  Start taking on: August 31, 2024            CONTINUE taking these medications      allopurinoL 300 MG tablet  Commonly known as: ZYLOPRIM  Take 300 mg by mouth once daily.     amLODIPine 10 MG tablet  Commonly known as: NORVASC  Take 10 mg by mouth once daily.     aspirin 325 MG tablet  Take 162.5 mg by mouth once daily.     clopidogreL 75 mg tablet  Commonly known as: PLAVIX  Take 75 mg by mouth once daily.     D5W PgBk 100 mL with piperacillin-tazobactam 4.5 gram SolR 4.5 g  Inject 4.5 g into the vein every 8 (eight) hours.     losartan 100 MG tablet  Commonly known as: COZAAR  Take 100 mg by mouth once daily.     metoprolol  succinate 25 MG 24 hr tablet  Commonly known as: TOPROL-XL  Take 25 mg by mouth every evening.     mirtazapine 30 MG tablet  Commonly known as: REMERON  Take 30 mg by mouth every evening.     nitroGLYCERIN 0.4 MG SL tablet  Commonly known as: NITROSTAT  Place 0.4 mg under the tongue every 5 (five) minutes as needed for Chest pain.     oxyCODONE-acetaminophen  mg per tablet  Commonly known as: PERCOCET  Take 1 tablet by mouth 4 (four) times daily as needed for Pain.     pantoprazole 40 MG tablet  Commonly known as: PROTONIX  Take 40 mg by mouth 2 (two) times daily.     REPATHA SURECLICK 140 mg/mL Pnij  Generic drug: evolocumab  Inject 140 mg into the skin every 14 (fourteen) days.     rosuvastatin 40 MG Tab  Commonly known as: CRESTOR  Take 40 mg by mouth once daily.     tamsulosin 0.4 mg Cap  Commonly known as: FLOMAX  Take 0.8 mg by mouth every evening.     traZODone 50 MG tablet  Commonly known as: DESYREL  Take  mg by mouth every evening.              Indwelling Lines/Drains at time of discharge:   Lines/Drains/Airways       Drain  Duration                  Closed/Suction Drain 08/19/24 1252 Tube - 1 Abdomen Bulb 19 Fr. 11 days                    Time spent on the discharge of patient: 45 minutes of time spent on discharge, including examining the patient, providing discharge instructions, arranging followup and documentation.          Briana Yen MD  Department of Hospital Medicine  [facility] Neurosurgery [facility])

## 2024-08-30 NOTE — PLAN OF CARE
[facility] (Hospital)  Discharge Reassessment    Primary Care Provider: Liv Dai MD    Expected Discharge Date: 9/1/2024    Reassessment (most recent)       Discharge Reassessment - 08/30/24 0915          Discharge Reassessment    Assessment Type Discharge Planning Reassessment     Did the patient's condition or plan change since previous assessment? Yes     Discharge Plan discussed with: Patient     Communicated TIM with patient/caregiver Yes     Discharge Plan A Home Health     Discharge Plan B Home with family     DME Needed Upon Discharge  none     Transition of Care Barriers None     Why the patient remains in the hospital Requires continued medical care                   Pt not ready for discharge due to: having one post op drain   CM will remain available for patient/families on NPU.  Currently pt has d/c plans in progress at this time.    Discharge Plan A and Plan B have been determined by review of patient's clinical status, future medical and therapeutic needs, and coverage/benefits for post-acute care in coordination with multidisciplinary team members.         [id number] MD notified myself and atttending nurse that transfer for initiated on yesterday for the patient to transfer back to [facility]     Sabine Victor RN added to secure chat for an update on transfer.

## 2024-08-30 NOTE — ASSESSMENT & PLAN NOTE
- acute on chronic pain   - follows with pain management  - cont MM pain regimen  - still requiring IV pain meds, requesting transfer back to [facility] to be closer to home/family- talked to transfer center- will likely need a few more days of pain management - accepted and stable for transfer

## 2024-08-30 NOTE — ANESTHESIA POSTPROCEDURE EVALUATION
Anesthesia Post Evaluation    Patient: Adam Persaud    Procedure(s) Performed: Procedure(s) (LRB):  ERCP (ENDOSCOPIC RETROGRADE CHOLANGIOPANCREATOGRAPHY) (N/A)    Final Anesthesia Type: general      Patient location during evaluation: PACU  Patient participation: Yes- Able to Participate  Level of consciousness: awake and alert and oriented  Post-procedure vital signs: reviewed and stable  Pain management: adequate  Airway patency: patent    PONV status at discharge: No PONV  Anesthetic complications: no      Cardiovascular status: hemodynamically stable  Respiratory status: unassisted, spontaneous ventilation and room air  Hydration status: euvolemic  Follow-up not needed.          Vital signs stable, no issues at this time.    Event Time   Out of Recovery 14:25:00

## 2024-08-30 NOTE — CARE UPDATE
I have reviewed the chart of Adam Persaud who is hospitalized for the following:    Active Hospital Problems    Diagnosis    *Calculus of gallbladder with acute cholecystitis without obstruction    Hypokalemia     Monitor with daily cmp  replace      Constipation    Bile leak    Nicotine dependence     Centenary on cessation      Pain     Prn pain medicine      History of coronary artery bypass graft    Diarrhea     C/o   On stool softeners  Rule out cdiff on admission      S/P AAA (abdominal aortic aneurysm) repair    CAD (coronary artery disease)    BPH (benign prostatic hyperplasia)    ACP (advance care planning)    Prediabetes    Gout    Insomnia     On trazadone      Lumbar spondylosis    HTN (hypertension)    HLD (hyperlipidemia)        Olivia Cruz NP  Unit Based [facility]

## 2024-08-30 NOTE — ASSESSMENT & PLAN NOTE
Multifactorial iso abdominal surgery and opiates  Wean as able - currently pain not well controlled - possibly constipation is also contributing to pian   Bowel regimen BID   Monitor

## 2024-08-30 NOTE — ASSESSMENT & PLAN NOTE
Patient's most recent potassium results are listed below.   Recent Labs     08/28/24  0953 08/29/24  0645 08/30/24  0437   K 3.5 3.4* 3.5       Plan  - Replete potassium per protocol  - Monitor potassium Daily  - Patient's hypokalemia is resolved

## 2024-08-30 NOTE — ASSESSMENT & PLAN NOTE
S/p open noe 8/19, complicated by necrosis and adherence to bowel wall with 2100cc blood loss  HIDA 8/23 concerning for bile leak/CBD obstruction   ERCP 8/23, unable to cannulate the bile duct  Transferred to [facility] for AES evaluation  Continue Zosyn   AES consulted  Lipase normal  Pain control     s/p ERCP 8/26 which identified choledocholithiasis with distal CBD stone/sludge, an obvious bile leak was not observed. One temp stent placed into CBD and one temp stent placed into pancreatic duct  Cont zosyn while inpatient, will transition to cipro on discharge to complete 5 day course day 4/5 today  Continue to monitor output from RUQ drain.   -Consult gen surgery for assistance/recs - recommend discharge once output decreases/becomes serous- output is clearing  -Resume Plavix 8/31  -Repeat ERCP in 6 weeks to remove CBD stent. The pancreatic stent should pass spontaneously.  Monitor for pancreatitis, bleeding, perforation, and cholangitis   - still requiring IV pain meds

## 2024-08-30 NOTE — ASSESSMENT & PLAN NOTE
"Madeleine Sierra (30 y.o. Female)     Date of Birth Social Security Number Address Home Phone MRN    1987  [address]  [facility] [address] [phone] [id number]    Religious Marital Status          [employer]        Admission Date Admission Type Admitting Provider Attending Provider Department, Room/Bed    10/4/18 Elective Tevin Johnson MD  [facility] MOTHER BABY, M757/1    Discharge Date Discharge Disposition Discharge Destination        10/6/2018 Home or Self Care              Attending Provider:  (none)   Allergies:  Latex    Isolation:  None   Infection:  None   Code Status:  Prior    Ht:  167.6 cm (66\")   Wt:  142 kg (314 lb)    Admission Cmt:  None   Principal Problem:  None                Active Insurance as of 10/4/2018     Primary Coverage     Payor Plan Insurance Group Employer/Plan Group    PASSPORT PASSPORT MEDICAID     Payor Plan Address Payor Plan Phone Number Effective From Effective To    [address] [phone] 1997     [facility] [id number]       Subscriber Name Subscriber Birth Date Member ID       MADELEINE SIERRA 1987 [id number]                 Emergency Contacts      (Rel.) Home Phone Work Phone Mobile Phone    Waldemar Sierra (Spouse) -- -- [phone]    Mabel Chauhan (Mother) -- -- [phone]               Discharge Summary      Wilian Lynn III, MD at 10/6/2018  7:47 AM     Attestation signed by Bayron Florez MD at 10/6/2018 10:03 AM    I saw and evaluated the patient. I agree with the findings and the plan of care as documented in the resident's note.        This document has been electronically signed by Bayron Florez MD on 2018 10:03 AM                      [facility]  Madeleine Sierra  : 1987  MRN: [id number]  CSN: [id number]    Discharge Summary      Date of Admission: 10/4/2018   Date of Discharge:  10-6-18   Principle Discharge " Pain control     Dx: 1. Repeat Low transverse Caesarian Section with Tubal Ligation   Procedures Performed: Procedure(s):   SECTION REPEAT WITH TUBAL   Brief History: Patient is a 30 y.o. now  who presented to labor and delivery for repeat caesarian section.   Hospital Course: Her post-operative course was unremarkable.  On POD # 2 she expressed the desire for D/C. She had no febrile morbidity. She had passed gas, and was urinating normally. She was eating a regular diet without difficulty. She was ambulating well. Her incision was clean, dry and intact. Discharge instructions were given.  All questions were answered   Pending Studies:   Diet:  Discharge Condition: None  Regular  Stable   Discharge Activity: Vaginal rest for 6 weeks.  Other activity as tolerated.   Discharge Medications:    Your medication list      START taking these medications      Instructions Last Dose Given Next Dose Due   HYDROcodone-acetaminophen 5-325 MG per tablet  Commonly known as:  NORCO      Take 1 tablet by mouth Every 4 (Four) Hours As Needed for Severe Pain .       ibuprofen 800 MG tablet  Commonly known as:  ADVIL,MOTRIN      Take 1 tablet by mouth Every 8 (Eight) Hours As Needed for Mild Pain .          CONTINUE taking these medications      Instructions Last Dose Given Next Dose Due   MYNATAL PLUS tablet      Take 1 tablet by mouth Daily.          STOP taking these medications    clotrimazole-betamethasone 1-0.05 % cream  Commonly known as:  LOTRISONE        hydrocortisone 1 % cream              Where to Get Your Medications      You can get these medications from any pharmacy    Bring a paper prescription for each of these medications  · HYDROcodone-acetaminophen 5-325 MG per tablet  · ibuprofen 800 MG tablet        Discharge Disposition: home     Follow-up: Follow up in 2 weeks       This note has been electronically signed.        This document has been electronically signed by Wilian Lynn III, MD on 2018 7:47  AM        Electronically signed by Bayron Florez MD at 10/6/2018 10:03 AM

## 2024-08-30 NOTE — PLAN OF CARE
[facility] ([facility])  Discharge Final Note    Primary Care Provider: Liv Dai MD    Expected Discharge Date: 8/30/2024    Final Discharge Note (most recent)       Final Note - 08/30/24 1207          Final Note    Assessment Type Final Discharge Note     Anticipated Discharge Disposition --   Tranafered to [facility]    What phone number can be called within the next 1-3 days to see how you are doing after discharge? [phone] (P)      Hospital Resources/Appts/Education Provided Provided patient/caregiver with written discharge plan information;Provided education on problems/symptoms using teachback;Appointments scheduled and added to AVS (P)                      Important Message from Medicare  Important Message from Medicare regarding Discharge Appeal Rights: Given to patient/caregiver, Explained to patient/caregiver, Signed/date by patient/caregiver     Date IMM was signed: 08/27/24  Time IMM was signed: 1312    Contact Info       Liv Dai MD   Specialty: Internal Medicine   Relationship: PCP - General    [address]   [facility] [address]   Phone: [phone]       Next Steps: Schedule an appointment as soon as possible for a visit in 1 week(s)          Patient transfer has been accepted and he will transfer back to [facility]. Rayna with transfer center arranged transport. Patient aware and agreeable to transfer.

## 2024-09-03 ENCOUNTER — TELEPHONE (OUTPATIENT)
Dept: VASCULAR SURGERY | Facility: CLINIC | Age: 64
End: 2024-09-03
Payer: MEDICARE

## 2024-09-03 NOTE — TELEPHONE ENCOUNTER
Appt rescheduled per pt request due to recent hospitalization.         ----- Message from Meenakshi Morales sent at 9/3/2024  7:25 AM CDT -----  Contact: self  Type: Needs Medical Advice  Who Called:  pt  Symptoms (please be specific):  pt had stents put in and needs to reschedule  How long has patient had these symptoms:  n/a  Pharmacy name and phone #:  n/a  Best Call Back Number: [phone]   Additional Information: please call pt to verify if appts can be done or needs to be reschedule

## 2024-09-12 ENCOUNTER — TELEPHONE (OUTPATIENT)
Dept: ENDOSCOPY | Facility: HOSPITAL | Age: 64
End: 2024-09-12
Payer: MEDICARE

## 2024-09-12 NOTE — TELEPHONE ENCOUNTER
Telephone patient to schedule ERCP with no answer. Direct contact given to call back to schedule procedure.

## 2024-09-20 PROBLEM — S32.009A FRACTURE OF TRANSVERSE PROCESS OF LUMBAR VERTEBRA: Status: ACTIVE | Noted: 2024-09-20

## 2024-10-02 ENCOUNTER — TELEPHONE (OUTPATIENT)
Dept: ENDOSCOPY | Facility: HOSPITAL | Age: 64
End: 2024-10-02
Payer: MEDICARE

## 2024-10-02 NOTE — TELEPHONE ENCOUNTER
Spoke to pt to schedule ERCP f/u procedure. Pt currently scheduled ERCP w/ Dr. Figueroa. Message forward to MD

## 2024-10-14 PROBLEM — R10.84 GENERALIZED ABDOMINAL PAIN: Status: ACTIVE | Noted: 2024-10-14

## 2024-10-14 PROBLEM — R10.9 ABDOMINAL PAIN: Status: ACTIVE | Noted: 2024-10-14

## 2024-10-16 PROBLEM — T50.905A PILL ESOPHAGITIS: Status: ACTIVE | Noted: 2024-10-16

## 2024-10-16 PROBLEM — K20.90 ESOPHAGITIS: Status: ACTIVE | Noted: 2024-10-16

## 2024-10-16 PROBLEM — K29.70 GASTRITIS: Status: ACTIVE | Noted: 2024-10-16

## 2024-10-16 PROBLEM — K20.80 PILL ESOPHAGITIS: Status: ACTIVE | Noted: 2024-10-16

## 2024-10-18 ENCOUNTER — TELEPHONE (OUTPATIENT)
Dept: VASCULAR SURGERY | Facility: CLINIC | Age: 64
End: 2024-10-18
Payer: MEDICARE

## 2024-10-18 NOTE — TELEPHONE ENCOUNTER
Called pt to schedule CT, that Dr. Hernandez requires before appt, pt expressed that he has terrible back pain and was just discharged from the hosp. Pt states that when he starts feeling better he'll follow up with .

## 2024-10-21 ENCOUNTER — TELEPHONE (OUTPATIENT)
Dept: PAIN MEDICINE | Facility: CLINIC | Age: 64
End: 2024-10-21
Payer: MEDICARE

## 2024-10-21 NOTE — TELEPHONE ENCOUNTER
Attempted to call patient but he was unavailable. Left voicemail with callback number    ----- Message from Rafael sent at 10/21/2024  8:09 AM CDT -----  Contact: Self  Type:  Sooner Appointment Request    Caller is requesting a sooner appointment.  Caller declined first available appointment listed below.  Caller will not accept being placed on the waitlist and is requesting a message be sent to doctor.    Name of Caller:  Patient  When is the first available appointment?  NA  Symptoms:  Back Pain  Would the patient rather a call back or a response via [employer]? Call Back  Best Call Back Number:  [phone]  Additional Information:  Patient is requesting to establish care, he says he has a referral from [facility]

## 2024-10-23 NOTE — PROGRESS NOTES
[facility] Patient Visit      Referred by: [facility]    Primary Care Provider: Liv Dai MD    Chief Complaint:   Chief Complaint   Patient presents with    Low-back Pain          6/28/2018     9:13 AM 3/21/2018     8:04 AM 2/20/2018     9:51 AM   Last 3 PDI Scores   Pain Disability Index (PDI) 22 19 30         History of Present Illness:   Adam Persaud is a 64 y.o. male with hypertension, hyperlipidemia, BPH, coronary artery disease status post CABG x4, aortic aneurysm status post repair July 2024, cholecystectomy complicated by gallbladder necrosis requiring biliary stent and ERCP,  who presents with a chief complaint of lumbar back pain on the left.    The patient states that he started to experience low back and left leg pain after years of hard work. He states that he has lived with pain for 8-10 years. Pain is described as aching, throbbing, tight, and shooting. He states that the pain is continuous and will have sharp exacerbations of pain. Pain will radiate down his buttock to his left thigh. He states that he has reduced sensation in is outer thigh on the left. The patient states that his pain is worse with sitting, walking, bending, and getting out of a bed or chair. Pain improved with standing.     The patient states that he has never done physical therapy because he feels like it's a waste of his time. It ranges from 6-10/10 and averages 10/10. When their pain worsens, they will use gabapentin 800 mg at night and tizanidine 4 mg by mouth which works 100% of the time and decreases their pain to 6/10. Currently, they states that their pain impacts their life in a severe fashion over the past year. They are unable to walk far distances, lift heavy objects because of their pain. Pain is improved with bending forward in general and worsens whenever he changes positions.     Over the past 3 weeks, the patient has tried to start increasing his walking but every time he does so  his pain worsens.    They deny any new fever, chills, unintended weight loss. No new urinary or fecal incontinence. No new weakness or numbness in their extremities.     Pain Intervention History:  Multiple injections at L5 per patient    Past Spine Surgical History:  2021 - L4-5 and L5-S1 anterior fusion hardware and left L4-S1 posterior fusion hardware.     Past medications:  Topicals/OTC meds:  NSAIDs (prescription):  Anti-seizure medications:  Antidepressants:  Muscle relaxers:  Opioids:  Other:    Current medications:  Topicals/OTC meds:  Lidocaine 5%  NSAIDs (prescription):  Anti-seizure medications:  Gabapentin 800 mg  Antidepressants:  Mirtazapine 30 mg  Muscle relaxers:  Tizanidine 4 mg  Opioids:  Percocet 10 mg x 4 per day - recently fired for breaking an opioid contract  Other:    Antiplatelets/Anticoagulants:  Aspirin 81 and Plavix 75    Physical therapy: They have completed 0 weeks of physical therapy for their problem.   Acupuncture: None  Chiropractor: yes  Biofeedback: None  Pain Psychology: None    History:    Current Outpatient Medications:     allopurinol (ZYLOPRIM) 300 MG tablet, Take 300 mg by mouth once daily., Disp: , Rfl:     aspirin (ECOTRIN) 81 MG EC tablet, Take 1 tablet (81 mg total) by mouth once daily., Disp: , Rfl:     clopidogrel (PLAVIX) 75 mg tablet, Take 75 mg by mouth once daily., Disp: , Rfl:     gabapentin (NEURONTIN) 800 MG tablet, Take 800 mg by mouth 4 (four) times daily., Disp: , Rfl:     LIDOcaine (LIDODERM) 5 %, Place 1 patch onto the skin once daily. Remove & Discard patch within 12 hours or as directed by MD, Disp: 12 patch, Rfl: 0    lisinopriL 10 MG tablet, TAKE 1 TABLET(10 MG) BY MOUTH DAILY, Disp: 90 tablet, Rfl: 1    metoprolol succinate (TOPROL-XL) 25 MG 24 hr tablet, Take 25 mg by mouth every evening., Disp: , Rfl:     mirtazapine (REMERON) 30 MG tablet, Take 30 mg by mouth every evening., Disp: , Rfl:     nitroGLYCERIN (NITROSTAT) 0.4 MG SL tablet, Place 0.4 mg  under the tongue every 5 (five) minutes as needed for Chest pain., Disp: , Rfl:     oxyCODONE-acetaminophen (PERCOCET)  mg per tablet, Take 1 tablet by mouth every 4 (four) hours as needed for Pain., Disp: 30 tablet, Rfl: 0    pantoprazole (PROTONIX) 40 MG tablet, Take 40 mg by mouth 2 (two) times daily., Disp: , Rfl:     polyethylene glycol (GLYCOLAX) 17 gram PwPk, Take 17 g by mouth once daily., Disp: 30 packet, Rfl: 1    REPATHA SURECLICK 140 mg/mL PnIj, Inject 140 mg into the skin every 14 (fourteen) days., Disp: , Rfl:     rosuvastatin (CRESTOR) 40 MG Tab, TAKE 1 TABLET(40 MG) BY MOUTH DAILY, Disp: 90 tablet, Rfl: 1    tamsulosin (FLOMAX) 0.4 mg Cap, Take 0.8 mg by mouth every evening., Disp: , Rfl:     tiZANidine (ZANAFLEX) 4 MG tablet, Take 4 mg by mouth every 8 (eight) hours., Disp: , Rfl:     traZODone (DESYREL) 50 MG tablet, Take  mg by mouth every evening., Disp: , Rfl:     Past Medical History:   Diagnosis Date    Arthritis     Back pain     BPH (benign prostatic hyperplasia)     Coronary artery disease     Gout     Hyperlipidemia     Hypertension     S/P AAA (abdominal aortic aneurysm) repair 07/2024    Smoker        Past Surgical History:   Procedure Laterality Date    ABDOMINAL AORTIC ANEURYSM REPAIR, ENDOVASCULAR Bilateral 07/09/2024    Procedure: REPAIR-ANEURYSM-ABDOMINAL AORTIC-ENDOVASCULAR (AAA);  Surgeon: Jose Hernandez MD;  Location: [facility];  Service: Vascular;  Laterality: Bilateral;    CORONARY ANGIOPLASTY WITH STENT PLACEMENT      X2    CORONARY ARTERY BYPASS GRAFT  02/2024    [place]    ENDOSCOPIC ULTRASOUND OF UPPER GASTROINTESTINAL TRACT Left 10/16/2024    Procedure: ULTRASOUND, UPPER GI TRACT, ENDOSCOPIC;  Surgeon: Sherman Laird MD;  Location: [facility];  Service: Endoscopy;  Laterality: Left;    EPIDURAL STEROID INJECTION INTO CERVICAL SPINE N/A 07/10/2018    Procedure: Injection-steroid-epidural-cervical;  Surgeon: Kim Fuchs Jr., MD;  Location: [facility] OR;   Service: Pain Management;  Laterality: N/A;  to left    ERCP N/A 08/23/2024    Procedure: ERCP (ENDOSCOPIC RETROGRADE CHOLANGIOPANCREATOGRAPHY);  Surgeon: Brendon Figueroa MD;  Location: [facility];  Service: Endoscopy;  Laterality: N/A;    ERCP N/A 08/26/2024    Procedure: ERCP (ENDOSCOPIC RETROGRADE CHOLANGIOPANCREATOGRAPHY);  Surgeon: Eric Hunter MD;  Location: [facility] ENDO ([facility] FLR);  Service: Endoscopy;  Laterality: N/A;    ERCP N/A 10/4/2024    Procedure: ERCP (ENDOSCOPIC RETROGRADE CHOLANGIOPANCREATOGRAPHY);  Surgeon: Brendon Figueroa MD;  Location: [facility];  Service: Endoscopy;  Laterality: N/A;    ESOPHAGOGASTRODUODENOSCOPY N/A 03/14/2023    Procedure: EGD (ESOPHAGOGASTRODUODENOSCOPY);  Surgeon: Brendon Figueroa MD;  Location: [facility] ENDO;  Service: Gastroenterology;  Laterality: N/A;    ESOPHAGOGASTRODUODENOSCOPY Left 10/16/2024    Procedure: EGD (ESOPHAGOGASTRODUODENOSCOPY);  Surgeon: Sherman Laird MD;  Location: [facility];  Service: Endoscopy;  Laterality: Left;    index finger amputation Left     LAPAROSCOPIC CHOLECYSTECTOMY N/A 08/19/2024    Procedure: CHOLECYSTECTOMY, LAPAROSCOPIC- converted to open;  Surgeon: Cristopher Daniel MD;  Location: [facility];  Service: General;  Laterality: N/A;    MANDIBLE SURGERY      wiring    RADIOFREQUENCY ABLATION OF LUMBAR MEDIAL BRANCH NERVE AT SINGLE LEVEL Bilateral 05/22/2018    Procedure: RADIOFREQUENCY THERMOCOAGULATION (RFTC)-NERVE-MEDIAN BRANCH-LUMBAR L3, L4, L5;  Surgeon: Bucky Paz MD;  Location: [facility];  Service: Pain Management;  Laterality: Bilateral;    SHOULDER ARTHROSCOPY Right        No family history on file.    Social History     Socioeconomic History    Marital status:    Tobacco Use    Smoking status: Every Day     Current packs/day: 2.00     Average packs/day: 2.0 packs/day for 30.0 years (60.0 ttl pk-yrs)     Types: Cigarettes    Smokeless tobacco: Never   Substance and Sexual Activity    Alcohol use: Yes      "Alcohol/week: 1.0 standard drink of alcohol     Types: 1 Cans of beer per week    Drug use: No     Social Drivers of Health     Financial Resource Strain: Low Risk  (10/14/2024)    Overall Financial Resource Strain (CARDIA)     Difficulty of Paying Living Expenses: Not hard at all   Food Insecurity: No Food Insecurity (10/14/2024)    Hunger Vital Sign     Worried About Running Out of Food in the Last Year: Never true     Ran Out of Food in the Last Year: Never true   Transportation Needs: No Transportation Needs (10/14/2024)    TRANSPORTATION NEEDS     Transportation : No   Physical Activity: Inactive (8/26/2024)    Exercise Vital Sign     Days of Exercise per Week: 0 days     Minutes of Exercise per Session: 0 min   Stress: No Stress Concern Present (10/14/2024)    Citizen of [place] of Occupational Health - Occupational Stress Questionnaire     Feeling of Stress : Not at all   Housing Stability: Low Risk  (10/14/2024)    Housing Stability Vital Sign     Unable to Pay for Housing in the Last Year: No     Homeless in the Last Year: No       Review of patient's allergies indicates:  No Known Allergies    Review of Systems:  See HPI for pertinent review of systems. Otherwise, review of systems is negative.    Social History:  The patient lives in [place], [place].   Employment: no  Tobacco use: yes - 2 ppd x 50 years  Alcohol use: yes - infrequent  Substances: no  Mood: "Pretty good - just aggravated with this pain all the time"    Physical Exam:  Vitals:    10/24/24 1556   BP: (!) 160/97   Pulse: (!) 120   Weight: 75.1 kg (165 lb 9.1 oz)   Height: 5' 8" (1.727 m)   PainSc: 10-Worst pain ever   PainLoc: Back     Body mass index is 25.17 kg/m².    Gen:  In moderate pain  Psych:  Anxious  Respiratory: non-labored, no signs of respiratory distress  Abd: non-distended  Skin: warm, dry and intact.    Neurological Exam:  Mental status: Awake, alert, normal language function  Gait: Antalgic, favoring right " leg    Motor/Reflexes:      Right Left   C5 Shoulder Abduction  5  5   C5/6 Elbow Flexion    5  5   C6 Wrist Extension  5  5   C7 Elbow Extension   5  5   C8/T1 (ulnar) First dorsal interosseous  5  5   C8/T1 (median) Abductor pollicis brevis 5 5        Right Left   L2/3 Iliopsoas  Hip flexion  5  5   L3/4 Quadriceps Knee Extension  4+  4+   L4/5 Tib Anterior Ankle Dorsiflexion   5  5   L5 Extensor Hallicus Longus Great toe extension  5  5   S1/S2 Gastroc/Soleus Plantar Flexion  4+  4+     Musculoskeletal Exam:    Lumbar spine:   Lumbar ROM (normal flexion 50'; extension 15'; lateral flexion 20'; rotation 5'): Abnormal - reduced   Facet loading: Positive negative equivocal: Positive bilateral  Sit-slump test: Positive negative equivocal: Equivocal bilateral  Straight leg raise: Positive negative equivocal: Negative bilateral  Lumbar paraspinal muscle tenderness: Positive negative equivocal: Positive left    Sacroiliac joint:  Nathan Finger: Positive negative equivocal: Positive left  MITCHELL/Zion: Positive negative equivocal: Positive bilateral  Thigh thrust: Positive negative equivocal: Positiveleft  Lateral Compression: Positive negative equivocal: Positive bilateral  Gaenslen's: Positive negative equivocal: Positive bilateral  Anterior Distraction: Positive negative equivocal: Positive left    Hip Joint:  Log roll: Positive negative equivocal: Negative bilateral    Other tests:  Greater trochanteric bursa tenderness: Positive negative equivocal: Negative bilateral    Labs:  Lab Results   Component Value Date    HGBA1C 5.7 (H) 07/08/2024       Lab Results   Component Value Date    WBC 11.21 10/14/2024    HGB 14.3 10/14/2024    HCT 44.3 10/14/2024    MCV 78 (L) 10/14/2024     (H) 10/14/2024           CMP  Sodium   Date Value Ref Range Status   10/14/2024 140 136 - 145 mmol/L Final     Potassium   Date Value Ref Range Status   10/14/2024 3.6 3.5 - 5.1 mmol/L Final     Comment:     Anion Gap reference range  revised on 4/28/2023     Chloride   Date Value Ref Range Status   10/14/2024 103 95 - 110 mmol/L Final     CO2   Date Value Ref Range Status   10/14/2024 23 22 - 31 mmol/L Final     Glucose   Date Value Ref Range Status   10/14/2024 134 (H) 70 - 110 mg/dL Final     Comment:     The ADA recommends the following guidelines for fasting glucose:    Normal:       less than 100 mg/dL    Prediabetes:  100 mg/dL to 125 mg/dL    Diabetes:     126 mg/dL or higher       BUN   Date Value Ref Range Status   10/14/2024 13 9 - 21 mg/dL Final     Creatinine   Date Value Ref Range Status   10/14/2024 0.76 0.50 - 1.40 mg/dL Final     Calcium   Date Value Ref Range Status   10/14/2024 9.9 8.4 - 10.2 mg/dL Final     Total Protein   Date Value Ref Range Status   10/14/2024 8.7 (H) 6.0 - 8.4 g/dL Final     Albumin   Date Value Ref Range Status   10/14/2024 4.6 3.5 - 5.2 g/dL Final     Total Bilirubin   Date Value Ref Range Status   10/14/2024 1.1 0.2 - 1.3 mg/dL Final     Alkaline Phosphatase   Date Value Ref Range Status   10/14/2024 176 (H) 38 - 145 U/L Final     AST   Date Value Ref Range Status   10/14/2024 28 17 - 59 U/L Final     ALT   Date Value Ref Range Status   10/14/2024 16 0 - 50 U/L Final     Anion Gap   Date Value Ref Range Status   10/14/2024 14 (H) 5 - 12 mmol/L Final     Comment:     Anion Gap reference range revised on 4/28/2023     eGFR   Date Value Ref Range Status   10/14/2024 >60 >60 mL/min/1.73 m^2 Final       Imaging:  Xray  -    CT  9/2024  FINDINGS:  Mineralization: Mild osteopenia is seen of the visualized bony structures.Congenital: None.Bone alignment: Unremarkable with no significant listhesis.Curvature: There is straightening of the lumbar lordotic curvature.Bone and bone marrow: The vertebral body heights are maintained.Intervertebral disc spaces: There are interbody disc spacers seen in L4-L5 and L5-S1 levels; otherwise the rest of the intervertebral disc spaces are unremarkable.Osteophytes: Mild  multilevel osteophytes are seen.Facet degenerative changes: Moderate multilevel facet degenerative changes are seen.Spinal canal: No bony spinal canal stenosis identified.Fractures: There is an acute minimally displaced fracture involving the left transverse process of L2 seen on series 4 image 95.Orthopedic Hardware: Interbody fusion hardware is present at L4-L5 through L5-S1. Intervertebral disc prosthesis hardware is present at L4-L5 and L5-S1.Miscellaneous: Incidental note of aorto-biiliac stent and biliary stent. There are multiple diverticula in the sigmoid colon without associated fat stranding to suggest diverticulitis.     Impression:     1. There is an acute minimally displaced fracture involving the left transverse process of L2.    MRI  9/2024  FINDINGS:  NOMENCLATURE: Five lumbar type vertebral bodies.     CORD/CAUDA EQUINA: Conus has normal size and signal and ends at a normal level of L1-L2.     ALIGNMENT: Normal.     BONES: Vertebral body heights are maintained.  Abnormal signal corresponding to the left L2 transverse process fracture seen on CT from 09/18/2024.  L4-5 and L5-S1 anterior fusion hardware and left L4-S1 posterior fusion hardware.  L2 vertebral body hemangioma.     PARASPINAL AREA: Minor bilateral lower lumbar dorsal paraspinal muscular STIR signal hyperintensity.  Mild STIR signal hyperintensity surrounding the left L2 transverse process fracture.     LUMBAR DISC LEVELS:     T12-L1: No disc herniation or significant posterior osteophytic ridging.  No significant spinal canal or foraminal stenosis.     L1-L2: Minimal disc bulge.  Slight loss of normal signal in the disc.  Mild left greater than right facet hypertrophy.  Ligamentum flavum thickening.  No significant spinal canal stenosis.  Mild bilateral foraminal stenosis.     L2-L3: Mild disc bulge.  Mild-moderate bilateral facet hypertrophy.  Ligamentum flavum thickening.  Mild right and minimal left narrowing of the lateral recesses.   No significant spinal canal stenosis.  Mild-moderate left and mild right foraminal stenosis.     L3-L4: Mild disc bulge.  Moderate bilateral facet hypertrophy.  Ligamentum flavum thickening.  Mild left and minimal right narrowing of the lateral recesses.  No significant spinal canal stenosis.  Mild-moderate left and mild right foraminal stenosis.     L4-L5: Fused.  No significant spinal canal stenosis.  Mild bilateral foraminal stenosis.     L5-S1: Fused.  Central posterior osseous ridging.  Minimal narrowing of the right lateral recess.  No significant spinal canal stenosis.  Mild bilateral foraminal stenosis.     Impression:     1. Nighthawk concordant.  Acute or subacute left L2 transverse process fracture.  2. Multilevel spondylosis without significant spinal canal stenosis.  3. Multilevel foraminal stenosis, greatest on the left at L2-3 and L3-4 where it is mild-moderate.    Electrodiagnostics:      ASSESSMENT/PLAN:   Problem List Items Addressed This Visit    None    64 y.o. year old male with hypertension, hyperlipidemia, BPH, coronary artery disease status post CABG x4, aortic aneurysm status post repair July 2024, cholecystectomy complicated by gallbladder necrosis requiring biliary stent and ERCP,  who presents with a chief complaint of lumbar back pain on the left.    Mr. Persaud states that as left with back pain for most of his life.  He underwent L4-5 and L5-S1 anterior fusion and left L4-S1 posterior fusion in the past with some improvement in his radicular symptoms.  He was previously followed by an outside pain physician is prescribing chronic opioids for his pain.  Unfortunately, the patient broke his opioid contract no longer has a regular prescriber.  Patient's pain is located in his left upper buttock region consistent with probable SI joint dysfunction, which would be consistent with adjacent segment disease from his L5-S1 fusion on the left.  He states that the pain is located in his left  lower back/upper buttock region and radiates along the outer thigh to the knee but not beyond it.  On exam the patient had increased pain with SI joint provocation maneuvers but also had pain with most movements.  He had significant myofascial tenderness, so trigger point injections were performed today.  Afterwards, the patient stated that his pain was reduced from 20/10 to a 10/10.    At this point the patient most likely has multifactorial back pain with strong myofascial and probable sacroiliac components.  We will ask him to return for sacroiliac joint injection with local anesthetic and steroid on the left.  He can repeat trigger point injections if he likes in 2 weeks.  We will refer him to physical therapy for low back pain and sacroiliac joint dysfunction.  I will also place a consult for addiction medicine as patient stated that he was interested in finding ways to get off of his opioid containing medicine, which he has been on for many years.    Lastly I will start him on duloxetine 30 mg daily for his pain myofascial pain.    1. Multifactorial back pain  2. Lumbar myofascial pain  3. Sacroiliac joint dysfunction bilateral left more than right  4. Status post L4-5 and L5-S1 fusion   5. Chronic opioid use     -- Procedures:  Trigger point injections performed today; bring him back for a left SI joint injection. Risks/Benefits/Alternatives discussed  -- Diagnostics and Imaging:  MRI lumbar spine was reviewed today with the patient; it is not appear that he has any pain from his left L2 transverse process fracture that was listed as acute to subacute back in September.  -- Medications:    -- Rescue:  Tizanidine 4 mg prescribed by an other provider   -- Daily:  Start duloxetine 30 mg daily, gabapentin by another provider   -- Topical:  Lidocaine 5% patches   -- TENS: Safe to use without any implanted devices / pacemakers / defibrillators. See instructional video: TENS unit  -- Physical therapy: Continue home  "exercise regimen.  Referral to PT for low back pain sacroiliac joint dysfunction  -- Pain Psychology: See free resources below.  Patient said he was not interested today  -- Future options:  Increase duloxetine, repeat trigger point injections, encourage physical therapy and pain psychology    Pain Psychology Resources:  -- "Harnessing the Power of your Thoughts for Pain Control" - [employer] [place] [facility] Pain Education Day 2016.   -- "Pain Catastrophizing" - Sagar Canela education Youtube channel  -- Free 8-week Mindfulness Course - [place] Mindfulness-Based Stress Reduction  -- [id number] Free Sera for stress reduction developed by the [employer] for [employer]     Follow Up: Return to clinic in 2 month(s)    PDMP: Reviewed and inconsistent with medication use as prescribed. Report showing aberrant drug behavior.    Total time: 56 min    Hamlet Kang MD  Headache and Pain Management  [facility] [place] [place]    "

## 2024-10-24 ENCOUNTER — OFFICE VISIT (OUTPATIENT)
Dept: PAIN MEDICINE | Facility: CLINIC | Age: 64
End: 2024-10-24
Payer: MEDICARE

## 2024-10-24 VITALS
BODY MASS INDEX: 25.09 KG/M2 | HEIGHT: 68 IN | HEART RATE: 120 BPM | SYSTOLIC BLOOD PRESSURE: 160 MMHG | WEIGHT: 165.56 LBS | DIASTOLIC BLOOD PRESSURE: 97 MMHG

## 2024-10-24 DIAGNOSIS — M47.819 FACET ARTHROPATHY: ICD-10-CM

## 2024-10-24 DIAGNOSIS — F11.90 OPIOID USE DISORDER: ICD-10-CM

## 2024-10-24 DIAGNOSIS — M79.18 MYOFASCIAL PAIN SYNDROME OF LUMBAR SPINE: ICD-10-CM

## 2024-10-24 DIAGNOSIS — M53.3 SACROILIAC JOINT DYSFUNCTION: Primary | ICD-10-CM

## 2024-10-24 PROCEDURE — 99999 PR PBB SHADOW E&M-EST. PATIENT-LVL V: CPT | Mod: PBBFAC,,, | Performed by: INTERNAL MEDICINE

## 2024-10-24 RX ORDER — DULOXETIN HYDROCHLORIDE 30 MG/1
30 CAPSULE, DELAYED RELEASE ORAL DAILY
Qty: 30 CAPSULE | Refills: 11 | Status: SHIPPED | OUTPATIENT
Start: 2024-10-24 | End: 2025-10-24

## 2024-10-24 NOTE — PATIENT INSTRUCTIONS
-- I will order your sacroiliac joint injection today; they will call you to schedule  -- Please try duloxetine 30 mg daily  -- Be on the lookout for a call about scheduling your appointment with addiction medicine to help you get off opioids  -- Try PT for your low back and sacroiliac joint pain  -- Follow-up in 2 months

## 2024-10-25 ENCOUNTER — TELEPHONE (OUTPATIENT)
Dept: PAIN MEDICINE | Facility: CLINIC | Age: 64
End: 2024-10-25
Payer: MEDICARE

## 2024-10-25 DIAGNOSIS — M53.3 SACROILIAC JOINT DYSFUNCTION: Primary | ICD-10-CM

## 2024-10-25 NOTE — TELEPHONE ENCOUNTER
Physician - Dr Kang    Type of Procedure/Injection - Sacroiliac Joint Steroid Injection           Laterality - Left      Anxiolysis- RNIV      Need to hold medication - No      N/A      Clearance needed - No      Follow up - phone call next day    Hamlet Kang MD  Headache and Pain Management  [facility] [place]

## 2024-10-25 NOTE — PROCEDURES
Trigger Point Injection    Performed by: Hamlet Kang MD  Authorized by: Hamlet Kang MD    Lumbar Paraspinal:  Bilateral    Consent Done?:  Yes (Verbal)    Pre-Procedure:   Indications:  Pain relief and diagnostic evaluation  Site marked: the procedure site was marked     Timeout: prior to procedure the correct patient, procedure, and site was verified    Prep: patient was prepped and draped in usual sterile fashion     Local anesthesia used?: Yes    Anesthesia:  Local infiltration  Local anesthetic:  Bupivacaine 0.25% without epinephrine  Anesthetic total (ml):  10      Verbal consent was completed. Pt acknowledged the risks of the procedure include (but not necessarily limited to) pain, bleeding, bruising, infection at the injection site, nerve injury / damage, and ineffectiveness. Pt wished to proceed with the procedure.      Hamlet Kang MD  Headache and Pain Management  [facility] [place]

## 2024-10-28 DIAGNOSIS — M53.3 SACROILIAC JOINT DYSFUNCTION: Primary | ICD-10-CM

## 2024-10-28 DIAGNOSIS — M46.1 SACROILIITIS: ICD-10-CM

## 2024-10-28 RX ORDER — SODIUM CHLORIDE, SODIUM LACTATE, POTASSIUM CHLORIDE, CALCIUM CHLORIDE 600; 310; 30; 20 MG/100ML; MG/100ML; MG/100ML; MG/100ML
INJECTION, SOLUTION INTRAVENOUS CONTINUOUS
OUTPATIENT
Start: 2024-10-28

## 2024-10-30 ENCOUNTER — TELEPHONE (OUTPATIENT)
Dept: SURGERY | Facility: HOSPITAL | Age: 64
End: 2024-10-30
Payer: MEDICARE

## 2024-11-04 ENCOUNTER — HOSPITAL ENCOUNTER (OUTPATIENT)
Facility: HOSPITAL | Age: 64
Discharge: HOME | End: 2024-11-04
Attending: INTERNAL MEDICINE | Admitting: INTERNAL MEDICINE
Payer: MEDICARE

## 2024-11-04 ENCOUNTER — HOSPITAL ENCOUNTER (OUTPATIENT)
Dept: RADIOLOGY | Facility: HOSPITAL | Age: 64
Discharge: HOME | End: 2024-11-04
Attending: INTERNAL MEDICINE | Admitting: INTERNAL MEDICINE
Payer: MEDICARE

## 2024-11-04 DIAGNOSIS — M54.50 LOWER BACK PAIN: ICD-10-CM

## 2024-11-04 DIAGNOSIS — M46.1 SACROILIITIS: ICD-10-CM

## 2024-11-04 DIAGNOSIS — M53.3 SACROILIAC JOINT DYSFUNCTION: ICD-10-CM

## 2024-11-04 PROCEDURE — 63600175 PHARM REV CODE 636 W HCPCS: Mod: PO | Performed by: INTERNAL MEDICINE

## 2024-11-04 PROCEDURE — 27096 INJECT SACROILIAC JOINT: CPT | Mod: PO,LT | Performed by: INTERNAL MEDICINE

## 2024-11-04 PROCEDURE — 25500020 PHARM REV CODE 255: Mod: PO | Performed by: INTERNAL MEDICINE

## 2024-11-04 RX ORDER — METHYLPREDNISOLONE ACETATE 80 MG/ML
INJECTION, SUSPENSION INTRA-ARTICULAR; INTRALESIONAL; INTRAMUSCULAR; SOFT TISSUE
Status: DISCONTINUED | OUTPATIENT
Start: 2024-11-04 | End: 2024-11-04 | Stop reason: HOSPADM

## 2024-11-04 RX ORDER — MIDAZOLAM HYDROCHLORIDE 1 MG/ML
INJECTION INTRAMUSCULAR; INTRAVENOUS
Status: DISCONTINUED | OUTPATIENT
Start: 2024-11-04 | End: 2024-11-04 | Stop reason: HOSPADM

## 2024-11-04 RX ORDER — BUPIVACAINE HYDROCHLORIDE 2.5 MG/ML
INJECTION, SOLUTION EPIDURAL; INFILTRATION; INTRACAUDAL
Status: DISCONTINUED | OUTPATIENT
Start: 2024-11-04 | End: 2024-11-04 | Stop reason: HOSPADM

## 2024-11-04 RX ORDER — METHADONE HYDROCHLORIDE 5 MG/1
35 TABLET ORAL DAILY
COMMUNITY

## 2024-11-04 RX ORDER — LIDOCAINE HYDROCHLORIDE 10 MG/ML
INJECTION, SOLUTION EPIDURAL; INFILTRATION; INTRACAUDAL; PERINEURAL
Status: DISCONTINUED | OUTPATIENT
Start: 2024-11-04 | End: 2024-11-04 | Stop reason: HOSPADM

## 2024-11-04 RX ORDER — SODIUM CHLORIDE, SODIUM LACTATE, POTASSIUM CHLORIDE, CALCIUM CHLORIDE 600; 310; 30; 20 MG/100ML; MG/100ML; MG/100ML; MG/100ML
INJECTION, SOLUTION INTRAVENOUS CONTINUOUS
Status: DISCONTINUED | OUTPATIENT
Start: 2024-11-04 | End: 2024-11-04 | Stop reason: HOSPADM

## 2024-11-04 NOTE — OP NOTE
Procedure Note    Procedure Date: 11/4/2024    Procedure Performed: Left Sacroiliac joint injection, with Fluoroscopic Guidance    Indications: Patient has failed conservative therapy.      Pre-op diagnosis: left sided Sacroiliitis    Post-op diagnosis: same    Physician: Jorge Luis    IV Sedation medications: 1 mg IV midazolam    Medications injected: 0.25% Bupivacaine 3 cc's methylprednislone 80 mg    Local anesthetic used: 1% Lidocaine, 3 ml    Estimated Blood Loss: minimal    Complications:  none    Technique:  The patient was interviewed in the holding area and Risks/Benefits were discussed, including, but not limited to, the possibility of new or different pain, bleeding or infection.   All questions were answered.  The patient understood and accepted risks.  Consent was reviewed.  A time out was taken to identify the patient, procedure and side of the procedure.  The skin was prepped with chloroprep x2 and sterile drapes were applied. The Left SI joint was identified by fluoroscopy in an oblique plane. Skin and subcutaneous tissues overyling the target area was anesthetized with 1-2 mL of Lidocaine 1%.  A 22g 3 1/2 inch spinal needle was advanced under intermittent fluoroscopy until joint space was entered at the junction of the superior 2/3 & inferior 1/3 of the joint.  There was no paresthesia with needle placement. Aspiration was negative for blood. Omnipaque 2 mL was injected confirming appropriate position and spread into the joint without intravascualr uptake. Next, a mixture of 80 mg methylprednisolone and 3 mL of 0.25% Marcaine (total 4ml) was injected without difficulty or resistance.      The patient tolerated the procedure well.  The patient was monitored after the procedure.  The patient was given post procedure and discharge instructions to follow at home. The patient was discharged in a stable condition    Hamlet Kang MD  Headache and Pain Management  [facility]

## 2024-11-04 NOTE — H&P
CC: Left sacroiliac joint dysfunction    HPI: The patient is a man with a history of hypertension, hyperlipidemia, BPH, coronary artery disease status post CABG x4, aortic aneurysm status post repair July 2024, cholecystectomy complicated by gallbladder necrosis requiring biliary stent and ERCP  here for sacroiliac joint dysfunction. There are no major changes in history and physical from 10/25/2024 by myself.    Past Medical History:   Diagnosis Date    Arthritis     Back pain     BPH (benign prostatic hyperplasia)     Coronary artery disease     Gout     Hyperlipidemia     Hypertension     S/P AAA (abdominal aortic aneurysm) repair 07/2024    Smoker        Past Surgical History:   Procedure Laterality Date    ABDOMINAL AORTIC ANEURYSM REPAIR, ENDOVASCULAR Bilateral 07/09/2024    Procedure: REPAIR-ANEURYSM-ABDOMINAL AORTIC-ENDOVASCULAR (AAA);  Surgeon: Jose Hernandez MD;  Location: [facility] OR;  Service: Vascular;  Laterality: Bilateral;    CORONARY ANGIOPLASTY WITH STENT PLACEMENT      X2    CORONARY ARTERY BYPASS GRAFT  02/2024    [place]    ENDOSCOPIC ULTRASOUND OF UPPER GASTROINTESTINAL TRACT Left 10/16/2024    Procedure: ULTRASOUND, UPPER GI TRACT, ENDOSCOPIC;  Surgeon: Sherman Laird MD;  Location: [facility];  Service: Endoscopy;  Laterality: Left;    EPIDURAL STEROID INJECTION INTO CERVICAL SPINE N/A 07/10/2018    Procedure: Injection-steroid-epidural-cervical;  Surgeon: Kim Fuchs Jr., MD;  Location: [facility] OR;  Service: Pain Management;  Laterality: N/A;  to left    ERCP N/A 08/23/2024    Procedure: ERCP (ENDOSCOPIC RETROGRADE CHOLANGIOPANCREATOGRAPHY);  Surgeon: Brendon Figueroa MD;  Location: [facility] ENDO;  Service: Endoscopy;  Laterality: N/A;    ERCP N/A 08/26/2024    Procedure: ERCP (ENDOSCOPIC RETROGRADE CHOLANGIOPANCREATOGRAPHY);  Surgeon: Eric Hunter MD;  Location: [facility] ENDO ([address] [place] [address]);  Service: Endoscopy;  Laterality: N/A;    ERCP N/A 10/4/2024    Procedure: ERCP (ENDOSCOPIC  RETROGRADE CHOLANGIOPANCREATOGRAPHY);  Surgeon: Brendon Figueroa MD;  Location: [facility];  Service: Endoscopy;  Laterality: N/A;    ESOPHAGOGASTRODUODENOSCOPY N/A 03/14/2023    Procedure: EGD (ESOPHAGOGASTRODUODENOSCOPY);  Surgeon: Brendon Figueroa MD;  Location: [facility];  Service: Gastroenterology;  Laterality: N/A;    ESOPHAGOGASTRODUODENOSCOPY Left 10/16/2024    Procedure: EGD (ESOPHAGOGASTRODUODENOSCOPY);  Surgeon: Sherman Laird MD;  Location: [facility];  Service: Endoscopy;  Laterality: Left;    index finger amputation Left     LAPAROSCOPIC CHOLECYSTECTOMY N/A 08/19/2024    Procedure: CHOLECYSTECTOMY, LAPAROSCOPIC- converted to open;  Surgeon: Cristopher Daniel MD;  Location: [facility];  Service: General;  Laterality: N/A;    MANDIBLE SURGERY      wiring    RADIOFREQUENCY ABLATION OF LUMBAR MEDIAL BRANCH NERVE AT SINGLE LEVEL Bilateral 05/22/2018    Procedure: RADIOFREQUENCY THERMOCOAGULATION (RFTC)-NERVE-MEDIAN BRANCH-LUMBAR L3, L4, L5;  Surgeon: Bucky Paz MD;  Location: [facility];  Service: Pain Management;  Laterality: Bilateral;    SHOULDER ARTHROSCOPY Right        No family history on file.    Social History     Socioeconomic History    Marital status:    Tobacco Use    Smoking status: Every Day     Current packs/day: 2.00     Average packs/day: 2.0 packs/day for 30.0 years (60.0 ttl pk-yrs)     Types: Cigarettes    Smokeless tobacco: Never   Substance and Sexual Activity    Alcohol use: Yes     Alcohol/week: 1.0 standard drink of alcohol     Types: 1 Cans of beer per week    Drug use: No     Social Drivers of Health     Financial Resource Strain: Low Risk  (10/14/2024)    Overall Financial Resource Strain (CARDIA)     Difficulty of Paying Living Expenses: Not hard at all   Food Insecurity: No Food Insecurity (10/14/2024)    Hunger Vital Sign     Worried About Running Out of Food in the Last Year: Never true     Ran Out of Food in the Last Year: Never true   Transportation Needs: No  "Transportation Needs (10/14/2024)    TRANSPORTATION NEEDS     Transportation : No   Physical Activity: Inactive (8/26/2024)    Exercise Vital Sign     Days of Exercise per Week: 0 days     Minutes of Exercise per Session: 0 min   Stress: No Stress Concern Present (10/14/2024)    [employer] [place] [employer] - Occupational Stress Questionnaire     Feeling of Stress : Not at all   Housing Stability: Low Risk  (10/14/2024)    Housing Stability Vital Sign     Unable to Pay for Housing in the Last Year: No     Homeless in the Last Year: No       Current Facility-Administered Medications   Medication Dose Route Frequency Provider Last Rate Last Admin    lactated ringers infusion   Intravenous Continuous Hamlet Kang MD           Review of patient's allergies indicates:  No Known Allergies    Vitals:    10/30/24 1323 11/04/24 1209   BP:  (!) 155/76   Pulse:  77   Resp:  18   Temp:  98.1 °F (36.7 °C)   TempSrc:  Skin   SpO2:  95%   Weight: 74.8 kg (165 lb)    Height: 5' 8" (1.727 m)        ASA 2, Mallampati 2    REVIEW OF SYSTEMS:     GENERAL: No weight loss, malaise or fevers.  HEENT:  No recent changes in vision or hearing  NECK: Negative for lumps, no difficulty with swallowing.  RESPIRATORY: Negative for cough, wheezing or shortness of breath, patient denies any recent URI.  CARDIOVASCULAR: Negative for chest pain, leg swelling or palpitations.  GI: Negative for abdominal discomfort, blood in stools or black stools or change in bowel habits.  MUSCULOSKELETAL: See HPI.  SKIN: Negative for lesions, rash, and itching.  PSYCH: No suicidal or homicidal ideations, no current mood disturbances.  HEMATOLOGY/LYMPHOLOGY: Negative for prolonged bleeding, bruising easily or swollen nodes. Patient is not currently taking any anti-coagulants  ENDO: No history of diabetes or thyroid dysfunction  NEURO: No history of syncope, paralysis, seizures or tremors.All other reviewed and negative other than " HPI.    Physical exam:  Gen: A and O x3, pleasant, well-groomed  Skin: No rashes or obvious lesions  HEENT: PERRLA, no obvious deformities on ears or in canals. No thyroid masses, trachea midline, no palpable lymph nodes in neck, axilla.  CVS: Regular rate and rhythm, normal S1 and S2, no murmurs.  Resp: Clear to auscultation bilaterally.  Abdomen: Soft, NT/ND, normal bowel sounds present.  Musculoskeletal/Neuro: Moving all extremities    Assessment:  Sacroiliac joint dysfunction  -     Place in Outpatient; Standing  -     Vital signs; Standing  -     Place 18-22 [facility] IV ; Standing  -     Verify informed consent; Standing  -     Notify physician ; Standing  -     Notify physician ; Standing  -     Notify physician (specify); Standing  -     Diet NPO; Standing  -     lactated ringers infusion    Sacroiliitis    Other orders  -     IP VTE LOW RISK PATIENT; Standing

## 2024-11-05 VITALS
SYSTOLIC BLOOD PRESSURE: 135 MMHG | WEIGHT: 165 LBS | HEART RATE: 78 BPM | HEIGHT: 68 IN | BODY MASS INDEX: 25.01 KG/M2 | RESPIRATION RATE: 18 BRPM | OXYGEN SATURATION: 98 % | TEMPERATURE: 98 F | DIASTOLIC BLOOD PRESSURE: 82 MMHG

## 2024-11-05 NOTE — DISCHARGE SUMMARY
[facility] - Surgery  Discharge Note  Short Stay    Procedure(s) (LRB):  INJECTION,SACROILIAC JOINT (Left)      OUTCOME: Patient tolerated treatment/procedure well without complication and is now ready for discharge.    DISPOSITION: Home or Self Care    FINAL DIAGNOSIS:  <principal problem not specified>    FOLLOWUP: In clinic    DISCHARGE INSTRUCTIONS:  No discharge procedures on file.     TIME SPENT ON DISCHARGE: 5 minutes    Hamlet Kang MD  Headache and Pain Management  [facility]

## 2024-11-08 ENCOUNTER — HOSPITAL ENCOUNTER (OUTPATIENT)
Dept: RADIOLOGY | Facility: HOSPITAL | Age: 64
Discharge: HOME | End: 2024-11-08
Attending: STUDENT IN AN ORGANIZED HEALTH CARE EDUCATION/TRAINING PROGRAM
Payer: MEDICARE

## 2024-11-08 DIAGNOSIS — I73.9 PERIPHERAL VASCULAR DISEASE, UNSPECIFIED: ICD-10-CM

## 2024-11-08 PROCEDURE — 75635 CT ANGIO ABDOMINAL ARTERIES: CPT | Mod: TC,PO

## 2024-11-08 PROCEDURE — 25500020 PHARM REV CODE 255: Mod: PO | Performed by: STUDENT IN AN ORGANIZED HEALTH CARE EDUCATION/TRAINING PROGRAM

## 2024-11-08 PROCEDURE — 75635 CT ANGIO ABDOMINAL ARTERIES: CPT | Mod: 26,,, | Performed by: RADIOLOGY

## 2024-11-08 RX ADMIN — IOHEXOL 100 ML: 350 INJECTION, SOLUTION INTRAVENOUS at 02:11

## 2024-11-14 NOTE — PROGRESS NOTES
[facility]  Return Patient Visit      Referred by: No ref. provider found    Primary Care Provider: Liv Dai MD    Chief Complaint:   Chief Complaint   Patient presents with    Back Pain          11/15/2024     1:18 PM 6/28/2018     9:13 AM 3/21/2018     8:04 AM   Last 3 PDI Scores   Pain Disability Index (PDI) 5 22 19         History of Present Illness:   Adam Persaud is a 64 y.o. male with hypertension, hyperlipidemia, BPH, coronary artery disease status post CABG x4, aortic aneurysm status post repair July 2024, cholecystectomy complicated by gallbladder necrosis requiring biliary stent and ERCP,  who presents with a chief complaint of lumbar back pain on the left.    The patient states that he started to experience low back and left leg pain after years of hard work. He states that he has lived with pain for 8-10 years. Pain is described as aching, throbbing, tight, and shooting. He states that the pain is continuous and will have sharp exacerbations of pain. Pain will radiate down his buttock to his left thigh. He states that he has reduced sensation in is outer thigh on the left. The patient states that his pain is worse with sitting, walking, bending, and getting out of a bed or chair. Pain improved with standing.     The patient states that he has never done physical therapy because he feels like it's a waste of his time. It ranges from 6-10/10 and averages 10/10. When their pain worsens, they will use gabapentin 800 mg at night and tizanidine 4 mg by mouth which works 100% of the time and decreases their pain to 6/10. Currently, they states that their pain impacts their life in a severe fashion over the past year. They are unable to walk far distances, lift heavy objects because of their pain. Pain is improved with bending forward in general and worsens whenever he changes positions.     Over the past 3 weeks, the patient has tried to start increasing his walking but every  time he does so his pain worsens.    They deny any new fever, chills, unintended weight loss. No new urinary or fecal incontinence. No new weakness or numbness in their extremities.     Interval Events  11/15/2024:  Last seen 10/23/2024.  At that time the assessment was that the patient was experiencing multifactorial back pain.  So we performed trigger point injections and set him up for sacroiliac joint injection on the left.  I will started him on duloxetine daily. Since then, the patient saw addiction med outside and started methadone and got his SI joint injection on 11/4/2024. He says that he his pain has improved significantly. Otherwise, he has been experiencing lightheadedness with standing, which preceded methadone initiation    Pain Intervention History:  Multiple injections at L5 per patient  10/25/2024 - TPI - 20%temporary relief  11/4/2024 - SIJ injection left - 95% improved    Past Spine Surgical History:  2021 - L4-5 and L5-S1 anterior fusion hardware and left L4-S1 posterior fusion hardware.     Past medications:  Topicals/OTC meds:  NSAIDs (prescription):  Anti-seizure medications:  Antidepressants:  Muscle relaxers:  Opioids: Percocet 10 mg x 4 per day - recently fired for breaking an opioid contract  Other:    Current medications:  Topicals/OTC meds:  Lidocaine 5%  NSAIDs (prescription):  Anti-seizure medications:  Gabapentin 800 mg  Antidepressants:  Mirtazapine 30 mg  Muscle relaxers:  Tizanidine 4 mg  Opioids:  Methadone 40 mg PO   Other:    Antiplatelets/Anticoagulants:  Aspirin 81 and Plavix 75    Physical therapy: They have completed 0 weeks of physical therapy for their problem.   Acupuncture: None  Chiropractor: yes  Biofeedback: None  Pain Psychology: None    History:    Current Outpatient Medications:     allopurinol (ZYLOPRIM) 300 MG tablet, Take 300 mg by mouth once daily., Disp: , Rfl:     aspirin (ECOTRIN) 81 MG EC tablet, Take 1 tablet (81 mg total) by mouth once daily., Disp: ,  Rfl:     clopidogrel (PLAVIX) 75 mg tablet, Take 75 mg by mouth once daily., Disp: , Rfl:     DULoxetine (CYMBALTA) 30 MG capsule, Take 1 capsule (30 mg total) by mouth once daily., Disp: 30 capsule, Rfl: 11    gabapentin (NEURONTIN) 800 MG tablet, Take 800 mg by mouth 4 (four) times daily., Disp: , Rfl:     LIDOcaine (LIDODERM) 5 %, Place 1 patch onto the skin once daily. Remove & Discard patch within 12 hours or as directed by MD, Disp: 12 patch, Rfl: 0    lisinopriL 10 MG tablet, TAKE 1 TABLET(10 MG) BY MOUTH DAILY, Disp: 90 tablet, Rfl: 1    methadone (DOLOPHINE) 5 MG tablet, Take 35 mg by mouth Daily., Disp: , Rfl:     metoprolol succinate (TOPROL-XL) 25 MG 24 hr tablet, Take 25 mg by mouth every evening., Disp: , Rfl:     mirtazapine (REMERON) 30 MG tablet, Take 30 mg by mouth every evening., Disp: , Rfl:     nitroGLYCERIN (NITROSTAT) 0.4 MG SL tablet, Place 0.4 mg under the tongue every 5 (five) minutes as needed for Chest pain., Disp: , Rfl:     pantoprazole (PROTONIX) 40 MG tablet, Take 40 mg by mouth 2 (two) times daily., Disp: , Rfl:     polyethylene glycol (GLYCOLAX) 17 gram PwPk, Take 17 g by mouth once daily., Disp: 30 packet, Rfl: 1    REPATHA SURECLICK 140 mg/mL PnIj, Inject 140 mg into the skin every 14 (fourteen) days., Disp: , Rfl:     rosuvastatin (CRESTOR) 40 MG Tab, TAKE 1 TABLET(40 MG) BY MOUTH DAILY, Disp: 90 tablet, Rfl: 1    tamsulosin (FLOMAX) 0.4 mg Cap, Take 0.8 mg by mouth every evening., Disp: , Rfl:     tiZANidine (ZANAFLEX) 4 MG tablet, Take 4 mg by mouth every 8 (eight) hours., Disp: , Rfl:     traZODone (DESYREL) 50 MG tablet, Take  mg by mouth every evening., Disp: , Rfl:     Past Medical History:   Diagnosis Date    Arthritis     Back pain     BPH (benign prostatic hyperplasia)     Coronary artery disease     Gout     Hyperlipidemia     Hypertension     S/P AAA (abdominal aortic aneurysm) repair 07/2024    Smoker        Past Surgical History:   Procedure Laterality Date     ABDOMINAL AORTIC ANEURYSM REPAIR, ENDOVASCULAR Bilateral 07/09/2024    Procedure: REPAIR-ANEURYSM-ABDOMINAL AORTIC-ENDOVASCULAR (AAA);  Surgeon: Jose Hernandez MD;  Location: [facility] OR;  Service: Vascular;  Laterality: Bilateral;    CORONARY ANGIOPLASTY WITH STENT PLACEMENT      X2    CORONARY ARTERY BYPASS GRAFT  02/2024    [place]    ENDOSCOPIC ULTRASOUND OF UPPER GASTROINTESTINAL TRACT Left 10/16/2024    Procedure: ULTRASOUND, UPPER GI TRACT, ENDOSCOPIC;  Surgeon: Sherman Laird MD;  Location: [facility];  Service: Endoscopy;  Laterality: Left;    EPIDURAL STEROID INJECTION INTO CERVICAL SPINE N/A 07/10/2018    Procedure: Injection-steroid-epidural-cervical;  Surgeon: Kim Fuchs Jr., MD;  Location: [facility] OR;  Service: Pain Management;  Laterality: N/A;  to left    ERCP N/A 08/23/2024    Procedure: ERCP (ENDOSCOPIC RETROGRADE CHOLANGIOPANCREATOGRAPHY);  Surgeon: Brendon Figueroa MD;  Location: [facility] ENDO;  Service: Endoscopy;  Laterality: N/A;    ERCP N/A 08/26/2024    Procedure: ERCP (ENDOSCOPIC RETROGRADE CHOLANGIOPANCREATOGRAPHY);  Surgeon: Eric Hunter MD;  Location: [facility] ENDO [address]);  Service: Endoscopy;  Laterality: N/A;    ERCP N/A 10/4/2024    Procedure: ERCP (ENDOSCOPIC RETROGRADE CHOLANGIOPANCREATOGRAPHY);  Surgeon: Brendon Figueroa MD;  Location: [facility];  Service: Endoscopy;  Laterality: N/A;    ESOPHAGOGASTRODUODENOSCOPY N/A 03/14/2023    Procedure: EGD (ESOPHAGOGASTRODUODENOSCOPY);  Surgeon: Brendon Figueroa MD;  Location: [facility] ENDO;  Service: Gastroenterology;  Laterality: N/A;    ESOPHAGOGASTRODUODENOSCOPY Left 10/16/2024    Procedure: EGD (ESOPHAGOGASTRODUODENOSCOPY);  Surgeon: Sherman Laird MD;  Location: [facility] ENDO;  Service: Endoscopy;  Laterality: Left;    index finger amputation Left     INJECTION, SACROILIAC JOINT Left 11/4/2024    Procedure: INJECTION,SACROILIAC JOINT;  Surgeon: Hamlet Kang MD;  Location: [facility] OR;  Service: Pain Management;   Laterality: Left;    LAPAROSCOPIC CHOLECYSTECTOMY N/A 08/19/2024    Procedure: CHOLECYSTECTOMY, LAPAROSCOPIC- converted to open;  Surgeon: Cristopher Daniel MD;  Location: [facility] OR;  Service: General;  Laterality: N/A;    MANDIBLE SURGERY      wiring    RADIOFREQUENCY ABLATION OF LUMBAR MEDIAL BRANCH NERVE AT SINGLE LEVEL Bilateral 05/22/2018    Procedure: RADIOFREQUENCY THERMOCOAGULATION (RFTC)-NERVE-MEDIAN BRANCH-LUMBAR L3, L4, L5;  Surgeon: Bucky Paz MD;  Location: [facility] OR;  Service: Pain Management;  Laterality: Bilateral;    SHOULDER ARTHROSCOPY Right        No family history on file.    Social History     Socioeconomic History    Marital status:    Tobacco Use    Smoking status: Every Day     Current packs/day: 2.00     Average packs/day: 2.0 packs/day for 30.0 years (60.0 ttl pk-yrs)     Types: Cigarettes    Smokeless tobacco: Never   Substance and Sexual Activity    Alcohol use: Yes     Alcohol/week: 1.0 standard drink of alcohol     Types: 1 Cans of beer per week    Drug use: No     Social Drivers of Health     Financial Resource Strain: Low Risk  (10/14/2024)    Overall Financial Resource Strain (CARDIA)     Difficulty of Paying Living Expenses: Not hard at all   Food Insecurity: No Food Insecurity (10/14/2024)    Hunger Vital Sign     Worried About Running Out of Food in the Last Year: Never true     Ran Out of Food in the Last Year: Never true   Transportation Needs: No Transportation Needs (10/14/2024)    TRANSPORTATION NEEDS     Transportation : No   Physical Activity: Inactive (8/26/2024)    Exercise Vital Sign     Days of Exercise per Week: 0 days     Minutes of Exercise per Session: 0 min   Stress: No Stress Concern Present (10/14/2024)    [employer] [place] [employer] - Occupational Stress Questionnaire     Feeling of Stress : Not at all   Housing Stability: Low Risk  (10/14/2024)    Housing Stability Vital Sign     Unable to Pay for Housing in the Last Year: No      "Homeless in the Last Year: No       Review of patient's allergies indicates:  No Known Allergies    Review of Systems:  See HPI for pertinent review of systems. Otherwise, review of systems is negative.    Social History:  The patient lives in [place], [place].   Employment: no  Tobacco use: yes - 2 ppd x 50 years  Alcohol use: yes - infrequent  Substances: no  Mood: "Pretty good - just aggravated with this pain all the time"    Physical Exam:  Vitals:    11/15/24 1320   BP: 131/66   Pulse: 73   Weight: 73.7 kg (162 lb 7.7 oz)   PainSc: 0-No pain   PainLoc: Back       Body mass index is 24.7 kg/m².    Psych:  well-appearing  Respiratory: non-labored, no signs of respiratory distress  Abd: non-distended  Skin: warm, dry and intact.    Neurological Exam:  Mental status: Awake, alert, normal language function  Gait: Antalgic, favoring right leg    Labs:  Lab Results   Component Value Date    HGBA1C 5.7 (H) 07/08/2024       Lab Results   Component Value Date    WBC 11.21 10/14/2024    HGB 14.3 10/14/2024    HCT 44.3 10/14/2024    MCV 78 (L) 10/14/2024     (H) 10/14/2024           CMP  Sodium   Date Value Ref Range Status   10/14/2024 140 136 - 145 mmol/L Final     Potassium   Date Value Ref Range Status   10/14/2024 3.6 3.5 - 5.1 mmol/L Final     Comment:     Anion Gap reference range revised on 4/28/2023     Chloride   Date Value Ref Range Status   10/14/2024 103 95 - 110 mmol/L Final     CO2   Date Value Ref Range Status   10/14/2024 23 22 - 31 mmol/L Final     Glucose   Date Value Ref Range Status   10/14/2024 134 (H) 70 - 110 mg/dL Final     Comment:     The ADA recommends the following guidelines for fasting glucose:    Normal:       less than 100 mg/dL    Prediabetes:  100 mg/dL to 125 mg/dL    Diabetes:     126 mg/dL or higher       BUN   Date Value Ref Range Status   10/14/2024 13 9 - 21 mg/dL Final     Creatinine   Date Value Ref Range Status   10/14/2024 0.76 0.50 - 1.40 mg/dL Final     Calcium   Date " Value Ref Range Status   10/14/2024 9.9 8.4 - 10.2 mg/dL Final     Total Protein   Date Value Ref Range Status   10/14/2024 8.7 (H) 6.0 - 8.4 g/dL Final     Albumin   Date Value Ref Range Status   10/14/2024 4.6 3.5 - 5.2 g/dL Final     Total Bilirubin   Date Value Ref Range Status   10/14/2024 1.1 0.2 - 1.3 mg/dL Final     Alkaline Phosphatase   Date Value Ref Range Status   10/14/2024 176 (H) 38 - 145 U/L Final     AST   Date Value Ref Range Status   10/14/2024 28 17 - 59 U/L Final     ALT   Date Value Ref Range Status   10/14/2024 16 0 - 50 U/L Final     Anion Gap   Date Value Ref Range Status   10/14/2024 14 (H) 5 - 12 mmol/L Final     Comment:     Anion Gap reference range revised on 4/28/2023     eGFR   Date Value Ref Range Status   10/14/2024 >60 >60 mL/min/1.73 m^2 Final       Imaging:  Xray  -    CT  9/2024  FINDINGS:  Mineralization: Mild osteopenia is seen of the visualized bony structures.Congenital: None.Bone alignment: Unremarkable with no significant listhesis.Curvature: There is straightening of the lumbar lordotic curvature.Bone and bone marrow: The vertebral body heights are maintained.Intervertebral disc spaces: There are interbody disc spacers seen in L4-L5 and L5-S1 levels; otherwise the rest of the intervertebral disc spaces are unremarkable.Osteophytes: Mild multilevel osteophytes are seen.Facet degenerative changes: Moderate multilevel facet degenerative changes are seen.Spinal canal: No bony spinal canal stenosis identified.Fractures: There is an acute minimally displaced fracture involving the left transverse process of L2 seen on series 4 image 95.Orthopedic Hardware: Interbody fusion hardware is present at L4-L5 through L5-S1. Intervertebral disc prosthesis hardware is present at L4-L5 and L5-S1.Miscellaneous: Incidental note of aorto-biiliac stent and biliary stent. There are multiple diverticula in the sigmoid colon without associated fat stranding to suggest diverticulitis.      Impression:     1. There is an acute minimally displaced fracture involving the left transverse process of L2.    MRI  9/2024  FINDINGS:  NOMENCLATURE: Five lumbar type vertebral bodies.     CORD/CAUDA EQUINA: Conus has normal size and signal and ends at a normal level of L1-L2.     ALIGNMENT: Normal.     BONES: Vertebral body heights are maintained.  Abnormal signal corresponding to the left L2 transverse process fracture seen on CT from 09/18/2024.  L4-5 and L5-S1 anterior fusion hardware and left L4-S1 posterior fusion hardware.  L2 vertebral body hemangioma.     PARASPINAL AREA: Minor bilateral lower lumbar dorsal paraspinal muscular STIR signal hyperintensity.  Mild STIR signal hyperintensity surrounding the left L2 transverse process fracture.     LUMBAR DISC LEVELS:     T12-L1: No disc herniation or significant posterior osteophytic ridging.  No significant spinal canal or foraminal stenosis.     L1-L2: Minimal disc bulge.  Slight loss of normal signal in the disc.  Mild left greater than right facet hypertrophy.  Ligamentum flavum thickening.  No significant spinal canal stenosis.  Mild bilateral foraminal stenosis.     L2-L3: Mild disc bulge.  Mild-moderate bilateral facet hypertrophy.  Ligamentum flavum thickening.  Mild right and minimal left narrowing of the lateral recesses.  No significant spinal canal stenosis.  Mild-moderate left and mild right foraminal stenosis.     L3-L4: Mild disc bulge.  Moderate bilateral facet hypertrophy.  Ligamentum flavum thickening.  Mild left and minimal right narrowing of the lateral recesses.  No significant spinal canal stenosis.  Mild-moderate left and mild right foraminal stenosis.     L4-L5: Fused.  No significant spinal canal stenosis.  Mild bilateral foraminal stenosis.     L5-S1: Fused.  Central posterior osseous ridging.  Minimal narrowing of the right lateral recess.  No significant spinal canal stenosis.  Mild bilateral foraminal stenosis.     Impression:      1. [place] concordant.  Acute or subacute left L2 transverse process fracture.  2. Multilevel spondylosis without significant spinal canal stenosis.  3. Multilevel foraminal stenosis, greatest on the left at L2-3 and L3-4 where it is mild-moderate.    Electrodiagnostics:      ASSESSMENT/PLAN:   Problem List Items Addressed This Visit    None  Visit Diagnoses       Sacroiliac joint dysfunction    -  Primary          64 y.o. year old male with hypertension, hyperlipidemia, BPH, coronary artery disease status post CABG x4, aortic aneurysm status post repair July 2024, cholecystectomy complicated by gallbladder necrosis requiring biliary stent and ERCP,  who presents with a chief complaint of lumbar back pain on the left.    Mr. Persaud is returning for further management of his left low back and buttock pain.  He started on methadone maintenance therapy with significant improvement in his pain and then underwent left SI joint injection with local anesthetic and steroid.  Currently, his pain is resolved.  I will ask him to return again in 2 months to discuss whether to continue his duloxetine medication.    1. Multifactorial back pain, improved  2. Lumbar myofascial pain  3. Sacroiliac joint dysfunction bilateral left more than right, improved  4. Status post L4-5 and L5-S1 fusion   5. Chronic opioid use on methadone maintenance therapy    -- Procedures:  None today  -- Diagnostics and Imaging:  None today  -- Medications:    -- Rescue:  Tizanidine 4 mg prescribed by an other provider   -- Daily:  Continue duloxetine 30 and gabapentin   -- Topical:  Lidocaine 5% patches   -- TENS: Safe to use without any implanted devices / pacemakers / defibrillators. See instructional video: TENS unit  -- Physical therapy: Continue home exercise regimen.  Referral to PT for low back pain sacroiliac joint dysfunction  -- Pain Psychology: See free resources below.  Patient said he was not interested today  -- Future options:   "Increase duloxetine, encourage physical therapy and pain psychology; repeat SI joint injection    Pain Psychology Resources:  -- "Harnessing the Power of your Thoughts for Pain Control" - [place] [employer] [place] [employer] Pain Education Day 2016.   -- "Pain Catastrophizing" - [employer]agar [employer] education Youtube channel  -- Free 8-week Mindfulness Course - [employer] Mindfulness-Based Stress Reduction  -- [id number] Free Sera for stress reduction developed by the [employer]     Follow Up: Return to clinic in 2 month(s)    PDMP: Reviewed and inconsistent with medication use as prescribed. Report showing aberrant drug behavior.    Total time: 11 min    Hamlet Kang MD  Headache and Pain Management  [facility] [place], [place]      "

## 2024-11-15 ENCOUNTER — OFFICE VISIT (OUTPATIENT)
Dept: PAIN MEDICINE | Facility: CLINIC | Age: 64
End: 2024-11-15
Payer: MEDICARE

## 2024-11-15 VITALS
BODY MASS INDEX: 24.7 KG/M2 | DIASTOLIC BLOOD PRESSURE: 66 MMHG | HEART RATE: 73 BPM | WEIGHT: 162.5 LBS | SYSTOLIC BLOOD PRESSURE: 131 MMHG

## 2024-11-15 DIAGNOSIS — M53.3 SACROILIAC JOINT DYSFUNCTION: Primary | ICD-10-CM

## 2024-11-15 PROCEDURE — 99999 PR PBB SHADOW E&M-EST. PATIENT-LVL III: CPT | Mod: PBBFAC,,, | Performed by: INTERNAL MEDICINE

## 2025-01-02 ENCOUNTER — OFFICE VISIT (OUTPATIENT)
Dept: VASCULAR SURGERY | Facility: CLINIC | Age: 65
End: 2025-01-02
Payer: MEDICARE

## 2025-01-02 VITALS
WEIGHT: 160.06 LBS | DIASTOLIC BLOOD PRESSURE: 78 MMHG | SYSTOLIC BLOOD PRESSURE: 131 MMHG | BODY MASS INDEX: 24.26 KG/M2 | HEIGHT: 68 IN | HEART RATE: 60 BPM

## 2025-01-02 DIAGNOSIS — F17.210 NICOTINE DEPENDENCE, CIGARETTES, UNCOMPLICATED: ICD-10-CM

## 2025-01-02 DIAGNOSIS — I73.9 PERIPHERAL VASCULAR DISEASE, UNSPECIFIED: Primary | ICD-10-CM

## 2025-01-02 PROCEDURE — 99999 PR PBB SHADOW E&M-EST. PATIENT-LVL III: CPT | Mod: PBBFAC,,, | Performed by: STUDENT IN AN ORGANIZED HEALTH CARE EDUCATION/TRAINING PROGRAM

## 2025-01-02 RX ORDER — MECLIZINE HYDROCHLORIDE 50 MG/1
25 TABLET ORAL 3 TIMES DAILY PRN
COMMUNITY

## 2025-01-02 NOTE — PROGRESS NOTES
[facility]  [facility]  History & Physical    PATIENT NAME: Adam Persaud  MRN: [id number]  TODAY'S DATE: 01/02/2025    SUBJECTIVE:     Chief Complaint:   Chief Complaint   Patient presents with    Follow-up         History of Present Illness:  Adam Persaud is a 63 y.o. 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  He was now status post EVAR 07/10/2024     He has been doing well since the EVAR.  He denies any abdominal pain that he was having prior to the procedure.  He just has his chronic back pain that he was had now for several years    Interval history 01/02/2025:  Complaining of sob and dizziness. Also having pain in bilateral lower extremity. The pain is constant and worse with walking and sleeping. He still smokes cigarettes.    Review of patient's allergies indicates:  No Known Allergies    Past Medical History:   Diagnosis Date    Arthritis     Back pain     BPH (benign prostatic hyperplasia)     Coronary artery disease     Gout     Hyperlipidemia     Hypertension     S/P AAA (abdominal aortic aneurysm) repair 07/2024    Smoker      Past Surgical History:   Procedure Laterality Date    ABDOMINAL AORTIC ANEURYSM REPAIR, ENDOVASCULAR Bilateral 07/09/2024    Procedure: REPAIR-ANEURYSM-ABDOMINAL AORTIC-ENDOVASCULAR (AAA);  Surgeon: Jose Hernandez MD;  Location: [facility] OR;  Service: Vascular;  Laterality: Bilateral;    CORONARY ANGIOPLASTY WITH STENT PLACEMENT      X2    CORONARY ARTERY BYPASS GRAFT  02/2024    [place]    ENDOSCOPIC ULTRASOUND OF UPPER GASTROINTESTINAL TRACT Left 10/16/2024    Procedure: ULTRASOUND, UPPER GI TRACT, ENDOSCOPIC;  Surgeon: Sherman Laird MD;  Location: [facility] ENDO;  Service: Endoscopy;  Laterality: Left;    EPIDURAL STEROID INJECTION INTO CERVICAL SPINE N/A 07/10/2018    Procedure: Injection-steroid-epidural-cervical;   Surgeon: Kim Fuchs Jr., MD;  Location: [facility] OR;  Service: Pain Management;  Laterality: N/A;  to left    ERCP N/A 08/23/2024    Procedure: ERCP (ENDOSCOPIC RETROGRADE CHOLANGIOPANCREATOGRAPHY);  Surgeon: Brendon Figueroa MD;  Location: [facility] ENDO;  Service: Endoscopy;  Laterality: N/A;    ERCP N/A 08/26/2024    Procedure: ERCP (ENDOSCOPIC RETROGRADE CHOLANGIOPANCREATOGRAPHY);  Surgeon: Eric Hunter MD;  Location: [facility] ENDO ([facility]);  Service: Endoscopy;  Laterality: N/A;    ERCP N/A 10/4/2024    Procedure: ERCP (ENDOSCOPIC RETROGRADE CHOLANGIOPANCREATOGRAPHY);  Surgeon: Brendon Figueroa MD;  Location: [facility];  Service: Endoscopy;  Laterality: N/A;    ESOPHAGOGASTRODUODENOSCOPY N/A 03/14/2023    Procedure: EGD (ESOPHAGOGASTRODUODENOSCOPY);  Surgeon: Brendon Figueroa MD;  Location: [facility];  Service: Gastroenterology;  Laterality: N/A;    ESOPHAGOGASTRODUODENOSCOPY Left 10/16/2024    Procedure: EGD (ESOPHAGOGASTRODUODENOSCOPY);  Surgeon: Sherman Laird MD;  Location: [facility];  Service: Endoscopy;  Laterality: Left;    index finger amputation Left     INJECTION, SACROILIAC JOINT Left 11/4/2024    Procedure: INJECTION,SACROILIAC JOINT;  Surgeon: Hamlet Kang MD;  Location: [facility] OR;  Service: Pain Management;  Laterality: Left;    LAPAROSCOPIC CHOLECYSTECTOMY N/A 08/19/2024    Procedure: CHOLECYSTECTOMY, LAPAROSCOPIC- converted to open;  Surgeon: Cristopher Daniel MD;  Location: [facility];  Service: General;  Laterality: N/A;    MANDIBLE SURGERY      wiring    RADIOFREQUENCY ABLATION OF LUMBAR MEDIAL BRANCH NERVE AT SINGLE LEVEL Bilateral 05/22/2018    Procedure: RADIOFREQUENCY THERMOCOAGULATION (RFTC)-NERVE-MEDIAN BRANCH-LUMBAR L3, L4, L5;  Surgeon: Bucky Paz MD;  Location: [facility] OR;  Service: Pain Management;  Laterality: Bilateral;    SHOULDER ARTHROSCOPY Right      No family history on file.  Social History     Tobacco Use    Smoking status: Every Day     Current packs/day:  2.00     Average packs/day: 2.0 packs/day for 30.0 years (60.0 ttl pk-yrs)     Types: Cigarettes    Smokeless tobacco: Never   Substance Use Topics    Alcohol use: Yes     Alcohol/week: 1.0 standard drink of alcohol     Types: 1 Cans of beer per week    Drug use: No        Review of Systems:  ROS    OBJECTIVE:     Vital Signs (Most Recent):  Pulse: 60 (01/02/25 0801)  BP: 131/78 (01/02/25 0801)      Physical Exam:  Physical Exam  Constitutional: Awake and oriented to person, place, and time. Appears well-developed and well-nourished. In no apparent distress.  HEENT: Normocephalic. Pupils normal and conjunctivae normal. Mucous membranes normal, no cyanosis or icterus, trachea central, no pallor or icterus is noted.  Neck: Normal range of motion. Neck supple. No JVD present. No bruit present.   Cardiovascular: Normal rate, regular rhythm and normal heart sounds. S1, S2. Exam reveals no gallop, clicks, or friction rub. No murmur noted.  Pulmonary/Chest: Effort normal and breath sounds clear to auscultation. No respiratory distress. No wheezes, rales, or coughing present.   Abdominal: Soft. Bowel sounds are normal. There is no tenderness. No rebound tenderness or guarding present. No abdomen pulsations, bruits, or masses noted.   Urinary: No kirby catheter present  Skin: Skin is warm and dry.  Extremities: No cyanosis, erythema, clubbing or edema noted at this time. No calf tenderness bilaterally.   Peripheral vascular system: no palpable pedal pulse bilaterally.       Laboratory:  Lab Results   Component Value Date    WBC 11.21 10/14/2024    HGB 14.3 10/14/2024    HCT 44.3 10/14/2024     (H) 10/14/2024    CHOL 81 (L) 06/19/2024    TRIG 149 06/19/2024    HDL 59 06/19/2024    ALT 16 10/14/2024    AST 28 10/14/2024     10/14/2024    K 3.6 10/14/2024     10/14/2024    CREATININE 0.76 10/14/2024    BUN 13 10/14/2024    CO2 23 10/14/2024    PSA 2.0 04/10/2023    INR 1.0 08/16/2024    HGBA1C 5.7 (H)  07/08/2024         Diagnostic Results:  CTA Runoff ABD PEL Bilat Lower Ext  Narrative: EXAMINATION:  CTA RUNOFF ABD PEL BILAT LOWER EXT    CLINICAL HISTORY:  Claudication or leg ischemia;  Peripheral vascular disease, unspecified    TECHNIQUE:  Using multi-detector helical CT technique, axial CT angiogram images of the abdomen, pelvis and bilateral lower extremities were obtained, after the administration of 100 cc of  Omnipaque 350 IV, . Post-processing coronal, sagittal, and 3D reconstructions of the lower extremity arteries performed.    COMPARISON:  10/14/2024    FINDINGS:  Aorta: The proximal abdominal aorta measures 3.1 cm diameter in the proximal segment near the renal takeoff.  Remaining aorta is normal in caliber.  There is an aorto bi-iliac stent which is patent.    Aortic Branches: Patent flow.    RIGHT LOWER EXTREMITY:    Common iliac: Patent    External iliac: Focal stenosis up to 80% involving a 2-3 cm segment of the external iliac.    Internal iliac: Plaque with less than 50% stenosis    Femoral: Plaque with up to 50% stenosis.    SFA: Short segment of up to 50% stenosis due to plaque in the mid segment    Deep Femoral: Patent flow    Popliteal: Patent flow    Trifurcation: Patent flow in all 3 trifurcation vessels to level just above the ankle.  Patent flow beyond the ankle via the DAGMAR and PTA.    LEFT LOWER EXTREMITY:    Common iliac: Plaque with less than 50% stenosis    External iliac: Plaque with less than 50% stenosis    Internal iliac: Occluded flow at the origin for several cm with some reconstitution of trickle flow distally.    Femoral: Patent flow    SFA: Plaque with less than 50% stenosis in the mid segment SFA.    Deep Femoral: Patent flow    Popliteal: Patent flow    Trifurcation: Patent 3 vessel flow beyond the ankle.    CHEST:    Heart/pericardium: Normal size heart.  Prior CABG.    Lung bases: There is dependent atelectasis and pleural fluid at the right lung base.  There is bilateral  lower lobe bronchial wall thickening.  There is centrilobular and paraseptal emphysema.    ABDOMEN:    Liver: Negative.    Gallbladder: Cholecystectomy..    Bile ducts: No dilation.    Spleen:  Negative.    Pancreas: Mild fatty atrophy..    Kidneys: No mass or hydronephrosis.    Adrenals:  Unremarkable.    Lymph nodes: No adenopathy.    Abdomen wall: Negative.    BOWEL: Scattered colonic diverticula.    PELVIS: Unremarkable urinary bladder and reproductive organs. No adenopathy.    Bones: Sternal wires.  Spinal degenerative change and fusion hardware at several levels near the lumbosacral junction.  Left hip total arthroplasty..  Impression: 1. Aorto bi-iliac stent with patent flow.  2. Mild fusiform aneurysm of the proximal abdominal aorta at the upper margin of the stent.  3. Focal high-grade stenosis involving the right external iliac artery (80%).  4. Several other areas of up to 50% stenosis in both lower extremity arterial systems due to plaque.  5. Three vessel runoff in both calves.  6. Pulmonary emphysema and small right pleural effusion.    Electronically signed by: Gal Perez  Date:    11/08/2024  Time:    15:50    Results for orders placed or performed during the hospital encounter of 11/08/24 (from the past 2160 hours)   CTA Runoff ABD PEL Bilat Lower Ext    Narrative    EXAMINATION:  CTA RUNOFF ABD PEL BILAT LOWER EXT    CLINICAL HISTORY:  Claudication or leg ischemia;  Peripheral vascular disease, unspecified    TECHNIQUE:  Using multi-detector helical CT technique, axial CT angiogram images of the abdomen, pelvis and bilateral lower extremities were obtained, after the administration of 100 cc of  Omnipaque 350 IV, . Post-processing coronal, sagittal, and 3D reconstructions of the lower extremity arteries performed.    COMPARISON:  10/14/2024    FINDINGS:  Aorta: The proximal abdominal aorta measures 3.1 cm diameter in the proximal segment near the renal takeoff.  Remaining aorta is normal in  caliber.  There is an aorto bi-iliac stent which is patent.    Aortic Branches: Patent flow.    RIGHT LOWER EXTREMITY:    Common iliac: Patent    External iliac: Focal stenosis up to 80% involving a 2-3 cm segment of the external iliac.    Internal iliac: Plaque with less than 50% stenosis    Femoral: Plaque with up to 50% stenosis.    SFA: Short segment of up to 50% stenosis due to plaque in the mid segment    Deep Femoral: Patent flow    Popliteal: Patent flow    Trifurcation: Patent flow in all 3 trifurcation vessels to level just above the ankle.  Patent flow beyond the ankle via the DAGMAR and PTA.    LEFT LOWER EXTREMITY:    Common iliac: Plaque with less than 50% stenosis    External iliac: Plaque with less than 50% stenosis    Internal iliac: Occluded flow at the origin for several cm with some reconstitution of trickle flow distally.    Femoral: Patent flow    SFA: Plaque with less than 50% stenosis in the mid segment SFA.    Deep Femoral: Patent flow    Popliteal: Patent flow    Trifurcation: Patent 3 vessel flow beyond the ankle.    CHEST:    Heart/pericardium: Normal size heart.  Prior CABG.    Lung bases: There is dependent atelectasis and pleural fluid at the right lung base.  There is bilateral lower lobe bronchial wall thickening.  There is centrilobular and paraseptal emphysema.    ABDOMEN:    Liver: Negative.    Gallbladder: Cholecystectomy..    Bile ducts: No dilation.    Spleen:  Negative.    Pancreas: Mild fatty atrophy..    Kidneys: No mass or hydronephrosis.    Adrenals:  Unremarkable.    Lymph nodes: No adenopathy.    Abdomen wall: Negative.    BOWEL: Scattered colonic diverticula.    PELVIS: Unremarkable urinary bladder and reproductive organs. No adenopathy.    Bones: Sternal wires.  Spinal degenerative change and fusion hardware at several levels near the lumbosacral junction.  Left hip total arthroplasty..      Impression    1. Aorto bi-iliac stent with patent flow.  2. Mild fusiform  aneurysm of the proximal abdominal aorta at the upper margin of the stent.  3. Focal high-grade stenosis involving the right external iliac artery (80%).  4. Several other areas of up to 50% stenosis in both lower extremity arterial systems due to plaque.  5. Three vessel runoff in both calves.  6. Pulmonary emphysema and small right pleural effusion.      Electronically signed by: Gal Perez  Date:    11/08/2024  Time:    15:50   Results for orders placed or performed during the hospital encounter of 10/12/24 (from the past 2160 hours)   CT Abdomen Pelvis With IV Contrast NO Oral Contrast    Narrative    EXAMINATION:  CT ABDOMEN PELVIS WITH IV CONTRAST    CLINICAL HISTORY:  Abdominal pain, acute, nonlocalized;    TECHNIQUE:  Low dose axial images, sagittal and coronal reformations were obtained from the lung bases to the pubic symphysis following the IV administration of 80 mL of Omnipaque 350 .  Oral contrast not given.  .  Automated exposure control used.    COMPARISON:  09/19/2024    FINDINGS:  Liver demonstrates normal enhancement with no focal lesion.    Cholecystectomy.  Hazy somewhat ill-defined area of hypodensity possible central trace fluid involving the gallbladder fossa and liver parenchyma in the region of the gallbladder fossa.  Stable from the prior.    Interval biliary stent removal.  No evidence of biliary ductal dilation.    Pancreas normal.  Stomach decompressed.  Spleen normal.  Adrenal glands normal.    Kidneys normal enhancement with no hydronephrosis.    Aortic endovascular stent in place.  No dilation.    Bowel loops normal with no thickening or dilation.    Mesentery normal with no phlegm a anna change or ascites.  No lymphadenopathy in the abdomen or pelvis.    Bladder and rectum normal.    Bones intact.    Mild right-sided pleural fluid and dependent atelectasis.      Impression    Post biliary duct stent removal.  There is a persistent somewhat hazy area of suspected  inflammatory change in the gallbladder fossa, possible trace fluid with no significant interval change from the prior.  Likely a phlegmon.  Otherwise no acute process seen.      Electronically signed by: Shaina Slade MD  Date:    10/12/2024  Time:    08:44          ASSESSMENT/PLAN:     64-year-old male who presents with a known history of peripheral arterial disease and abdominal aortic aneurysms status post EVAR 07/10/2024.    He presents after his surveillance CT scan which illustrates severe near occlusive stenosis of the right external artery.  I certainly do think this is contributing to his pain that has constant and consistent with chronic limb-threatening ischemia.  In addition to perfusion to the right lower extremity on muscle concerned about the patency rate of the right limb of the EVAR considering the outflow is compromise with this near occlusive right external iliac artery stenosis.    Our recommendation at this time with a plan for a right lower extremity angiogram with intention to stent the right external iliac artery.  During the angiogram we will also plan to evaluate the left lower extremity since he is having similar pain on the left side.  This pain in the left side can certainly be neurogenic in nature considering his chronic and the patient is on methadone.    Risks and benefits were discussed with the patient and all his questions were answered    We did discuss smoking cessation an excess of 3 minutes.        Jose eHrnandez M.D.   [facility]   Date of Service: 01/02/2025

## 2025-01-03 ENCOUNTER — TELEPHONE (OUTPATIENT)
Dept: VASCULAR SURGERY | Facility: CLINIC | Age: 65
End: 2025-01-03
Payer: MEDICARE

## 2025-01-03 DIAGNOSIS — I73.9 PERIPHERAL VASCULAR DISEASE, UNSPECIFIED: Primary | ICD-10-CM

## 2025-01-03 DIAGNOSIS — I73.9 PVD (PERIPHERAL VASCULAR DISEASE): ICD-10-CM

## 2025-01-03 DIAGNOSIS — I72.3 ANEURYSM OF ILIAC ARTERY: ICD-10-CM

## 2025-01-03 RX ORDER — LIDOCAINE HYDROCHLORIDE 10 MG/ML
1 INJECTION, SOLUTION EPIDURAL; INFILTRATION; INTRACAUDAL; PERINEURAL ONCE
OUTPATIENT
Start: 2025-01-03 | End: 2025-01-03

## 2025-01-06 ENCOUNTER — TELEPHONE (OUTPATIENT)
Dept: PAIN MEDICINE | Facility: CLINIC | Age: 65
End: 2025-01-06
Payer: MEDICARE

## 2025-01-06 NOTE — TELEPHONE ENCOUNTER
Spoke with patient and his PCP won't prescribe his gabapentin, and he's asking if Dr. Kang will fill it. He wants to take gabapentin instead of the duloxetine. He said he's been taking the duloxetine, but he feels like only the gabapentin helps with his pain

## 2025-01-06 NOTE — TELEPHONE ENCOUNTER
----- Message from Peri sent at 1/6/2025  8:07 AM CST -----  Regarding: advice  Type:  Needs Medical Advice    Who Called: pt    Best Call Back Number: [phone]      Additional Information: pt wants a call back to discuss changing his meds.  please call to discuss.

## 2025-01-07 RX ORDER — GABAPENTIN 800 MG/1
800 TABLET ORAL 4 TIMES DAILY
Qty: 120 TABLET | Refills: 11 | Status: SHIPPED | OUTPATIENT
Start: 2025-01-07

## 2025-02-19 NOTE — PROGRESS NOTES
[facility]  Return Patient Visit      Referred by: No ref. provider found    Primary Care Provider: Liv Dai MD    Chief Complaint:   Chief Complaint   Patient presents with    Follow-up          2/20/2025    10:55 AM 11/15/2024     1:18 PM 6/28/2018     9:13 AM   Last 3 PDI Scores   Pain Disability Index (PDI) 11 5 22         History of Present Illness:   Adam Persaud is a 64 y.o. male with hypertension, hyperlipidemia, BPH, coronary artery disease status post CABG x4, aortic aneurysm status post repair July 2024, cholecystectomy complicated by gallbladder necrosis requiring biliary stent and ERCP,  who presents with a chief complaint of lumbar back pain on the left.    The patient states that he started to experience low back and left leg pain after years of hard work. He states that he has lived with pain for 8-10 years. Pain is described as aching, throbbing, tight, and shooting. He states that the pain is continuous and will have sharp exacerbations of pain. Pain will radiate down his buttock to his left thigh. He states that he has reduced sensation in is outer thigh on the left. The patient states that his pain is worse with sitting, walking, bending, and getting out of a bed or chair. Pain improved with standing.     The patient states that he has never done physical therapy because he feels like it's a waste of his time. It ranges from 6-10/10 and averages 10/10. When their pain worsens, they will use gabapentin 800 mg at night and tizanidine 4 mg by mouth which works 100% of the time and decreases their pain to 6/10. Currently, they states that their pain impacts their life in a severe fashion over the past year. They are unable to walk far distances, lift heavy objects because of their pain. Pain is improved with bending forward in general and worsens whenever he changes positions.     Over the past 3 weeks, the patient has tried to start increasing his walking but every  time he does so his pain worsens.    They deny any new fever, chills, unintended weight loss. No new urinary or fecal incontinence. No new weakness or numbness in their extremities.     Interval Events  11/15/2024:  Last seen 10/23/2024.  At that time the assessment was that the patient was experiencing multifactorial back pain.  So we performed trigger point injections and set him up for sacroiliac joint injection on the left.  I will started him on duloxetine daily. Since then, the patient saw addiction med outside and started methadone and got his SI joint injection on 11/4/2024. He says that he his pain has improved significantly. Otherwise, he has been experiencing lightheadedness with standing, which preceded methadone initiation    2/20/2025: Last seen 11/2024. Plan was for him to return in 2 months to discuss whether continuing duloxetine was necessary since his pain improved on methadone. He says that he still has a little bit of side pain. He is doing work-up for prostate or colonoscopy but is considering repeat injections after those things have been addressed.    Pain Intervention History:  Multiple injections at L5 per patient  10/25/2024 - TPI - 20% temporary relief  11/4/2024 - SIJ injection left - 95% improved    Past Spine Surgical History:  2021 - L4-5 and L5-S1 anterior fusion hardware and left L4-S1 posterior fusion hardware.     Past medications:  Topicals/OTC meds:  NSAIDs (prescription):  Anti-seizure medications:  Antidepressants:  Muscle relaxers:  Opioids: Percocet 10 mg x 4 per day - recently fired for breaking an opioid contract  Other:    Current medications:  Topicals/OTC meds:  Lidocaine 5%  NSAIDs (prescription):  Anti-seizure medications:  Gabapentin 800 mg  Antidepressants:  Mirtazapine 30 mg  Muscle relaxers:  Tizanidine 4 mg  Opioids:  Methadone 40 mg PO   Other:    Antiplatelets/Anticoagulants:  Aspirin 81 and Plavix 75    Physical therapy: They have completed 0 weeks of physical  therapy for their problem.   Acupuncture: None  Chiropractor: yes  Biofeedback: None  Pain Psychology: None    History:    Current Outpatient Medications:     allopurinol (ZYLOPRIM) 300 MG tablet, Take 300 mg by mouth once daily., Disp: , Rfl:     aspirin (ECOTRIN) 81 MG EC tablet, Take 1 tablet (81 mg total) by mouth once daily., Disp: , Rfl:     clopidogrel (PLAVIX) 75 mg tablet, Take 75 mg by mouth once daily., Disp: , Rfl:     losartan (COZAAR) 100 MG tablet, Take 100 mg by mouth once daily., Disp: , Rfl:     meclizine (ANTIVERT) 50 MG tablet, Take 25 mg by mouth 3 (three) times daily as needed., Disp: , Rfl:     methadone (DOLOPHINE) 5 MG tablet, Take 60 mg by mouth Daily., Disp: , Rfl:     metoprolol succinate (TOPROL-XL) 25 MG 24 hr tablet, Take 25 mg by mouth every evening., Disp: , Rfl:     mirtazapine (REMERON) 30 MG tablet, Take 30 mg by mouth every evening., Disp: , Rfl:     nitroGLYCERIN (NITROSTAT) 0.4 MG SL tablet, Place 0.4 mg under the tongue every 5 (five) minutes as needed for Chest pain., Disp: , Rfl:     polyethylene glycol (GLYCOLAX) 17 gram PwPk, Take 17 g by mouth once daily. (Patient taking differently: Take 17 g by mouth daily as needed.), Disp: 30 packet, Rfl: 1    REPATHA SURECLICK 140 mg/mL PnIj, Inject 140 mg into the skin every 14 (fourteen) days., Disp: , Rfl:     tamsulosin (FLOMAX) 0.4 mg Cap, Take 0.8 mg by mouth every evening., Disp: , Rfl:     tiZANidine (ZANAFLEX) 4 MG tablet, Take 4 mg by mouth every 8 (eight) hours., Disp: , Rfl:     traZODone (DESYREL) 50 MG tablet, Take  mg by mouth every evening., Disp: , Rfl:     gabapentin (NEURONTIN) 800 MG tablet, Take 1 tablet (800 mg total) by mouth 4 (four) times daily., Disp: 120 tablet, Rfl: 11    LIDOcaine (LIDODERM) 5 %, Place 1 patch onto the skin once daily. Remove & Discard patch within 12 hours or as directed by MD (Patient not taking: Reported on 2/20/2025), Disp: 12 patch, Rfl: 0    Past Medical History:   Diagnosis  Date    Arthritis     Back pain     BPH (benign prostatic hyperplasia)     Coronary artery disease     Gout     Hyperlipidemia     Hypertension     PVD (peripheral vascular disease)     S/P AAA (abdominal aortic aneurysm) repair 07/2024    Smoker        Past Surgical History:   Procedure Laterality Date    ABDOMINAL AORTIC ANEURYSM REPAIR, ENDOVASCULAR Bilateral 07/09/2024    Procedure: REPAIR-ANEURYSM-ABDOMINAL AORTIC-ENDOVASCULAR (AAA);  Surgeon: Jose Hernandez MD;  Location: [facility] OR;  Service: Vascular;  Laterality: Bilateral;    CORONARY ANGIOPLASTY WITH STENT PLACEMENT      X2    CORONARY ARTERY BYPASS GRAFT  02/2024    [place]    ENDOSCOPIC ULTRASOUND OF UPPER GASTROINTESTINAL TRACT Left 10/16/2024    Procedure: ULTRASOUND, UPPER GI TRACT, ENDOSCOPIC;  Surgeon: Sherman Laird MD;  Location: [facility];  Service: Endoscopy;  Laterality: Left;    EPIDURAL STEROID INJECTION INTO CERVICAL SPINE N/A 07/10/2018    Procedure: Injection-steroid-epidural-cervical;  Surgeon: Kim Fuchs Jr., MD;  Location: [facility];  Service: Pain Management;  Laterality: N/A;  to left    ERCP N/A 08/23/2024    Procedure: ERCP (ENDOSCOPIC RETROGRADE CHOLANGIOPANCREATOGRAPHY);  Surgeon: Brendon Figueroa MD;  Location: [facility];  Service: Endoscopy;  Laterality: N/A;    ERCP N/A 08/26/2024    Procedure: ERCP (ENDOSCOPIC RETROGRADE CHOLANGIOPANCREATOGRAPHY);  Surgeon: Eric Hunter MD;  Location: [address]);  Service: Endoscopy;  Laterality: N/A;    ERCP N/A 10/4/2024    Procedure: ERCP (ENDOSCOPIC RETROGRADE CHOLANGIOPANCREATOGRAPHY);  Surgeon: Brendon Figueroa MD;  Location: [facility];  Service: Endoscopy;  Laterality: N/A;    ESOPHAGOGASTRODUODENOSCOPY N/A 03/14/2023    Procedure: EGD (ESOPHAGOGASTRODUODENOSCOPY);  Surgeon: Brendon Figeuroa MD;  Location: [facility];  Service: Gastroenterology;  Laterality: N/A;    ESOPHAGOGASTRODUODENOSCOPY Left 10/16/2024    Procedure: EGD (ESOPHAGOGASTRODUODENOSCOPY);   Surgeon: Sherman Laird MD;  Location: [facility] ENDO;  Service: Endoscopy;  Laterality: Left;    index finger amputation Left     INJECTION, SACROILIAC JOINT Left 11/4/2024    Procedure: INJECTION,SACROILIAC JOINT;  Surgeon: Hamlet Kang MD;  Location: [facility] OR;  Service: Pain Management;  Laterality: Left;    INSERTION OF STENT INTO PERIPHERAL VESSEL Right 1/15/2025    Procedure: Rt. Iliac stent placement;  Surgeon: Jose Hernandez MD;  Location: [facility] CATH;  Service: Cardiovascular;  Laterality: Right;    LAPAROSCOPIC CHOLECYSTECTOMY N/A 08/19/2024    Procedure: CHOLECYSTECTOMY, LAPAROSCOPIC- converted to open;  Surgeon: Cristopher Daniel MD;  Location: [facility] OR;  Service: General;  Laterality: N/A;    MANDIBLE SURGERY      wiring    RADIOFREQUENCY ABLATION OF LUMBAR MEDIAL BRANCH NERVE AT SINGLE LEVEL Bilateral 05/22/2018    Procedure: RADIOFREQUENCY THERMOCOAGULATION (RFTC)-NERVE-MEDIAN BRANCH-LUMBAR L3, L4, L5;  Surgeon: Bucky Paz MD;  Location: [facility] OR;  Service: Pain Management;  Laterality: Bilateral;    SHOULDER ARTHROSCOPY Right        No family history on file.    Social History     Socioeconomic History    Marital status:    Tobacco Use    Smoking status: Every Day     Current packs/day: 2.00     Average packs/day: 2.0 packs/day for 80.1 years (160.3 ttl pk-yrs)     Types: Cigarettes     Start date: 1975    Smokeless tobacco: Never   Substance and Sexual Activity    Alcohol use: Yes     Alcohol/week: 1.0 standard drink of alcohol     Types: 1 Cans of beer per week    Drug use: No     Social Drivers of Health     Financial Resource Strain: Low Risk  (10/14/2024)    Overall Financial Resource Strain (CARDIA)     Difficulty of Paying Living Expenses: Not hard at all   Food Insecurity: No Food Insecurity (10/14/2024)    Hunger Vital Sign     Worried About Running Out of Food in the Last Year: Never true     Ran Out of Food in the Last Year: Never true   Transportation Needs: No  "Transportation Needs (10/14/2024)    TRANSPORTATION NEEDS     Transportation : No   Physical Activity: Inactive (8/26/2024)    Exercise Vital Sign     Days of Exercise per Week: 0 days     Minutes of Exercise per Session: 0 min   Stress: No Stress Concern Present (10/14/2024)    [employer] - Occupational Stress Questionnaire     Feeling of Stress : Not at all   Housing Stability: Unknown (10/14/2024)    Housing Stability Vital Sign     Unable to Pay for Housing in the Last Year: No     Homeless in the Last Year: No       Review of patient's allergies indicates:  No Known Allergies    Review of Systems:  See HPI for pertinent review of systems. Otherwise, review of systems is negative.    Social History:  The patient lives in [place], [place].   Employment: no  Tobacco use: yes - 2 ppd x 50 years  Alcohol use: yes - infrequent  Substances: no  Mood: "Pretty good - just aggravated with this pain all the time"    Physical Exam:  Vitals:    02/20/25 1057   BP: 130/80   Pulse: (P) 62         There is no height or weight on file to calculate BMI.    Psych:  well-appearing  Respiratory: non-labored, no signs of respiratory distress  Abd: non-distended  Skin: warm, dry and intact.    Neurological Exam:  Mental status: Awake, alert, normal language function  Gait: Antalgic, favoring right leg    Labs:  Lab Results   Component Value Date    HGBA1C 5.7 (H) 07/08/2024       Lab Results   Component Value Date    WBC 7.56 01/15/2025    HGB 13.1 (L) 01/15/2025    HCT 41.2 01/15/2025    MCV 74 (L) 01/15/2025     01/15/2025           CMP  Sodium   Date Value Ref Range Status   01/15/2025 143 136 - 145 mmol/L Final     Potassium   Date Value Ref Range Status   01/15/2025 4.5 3.5 - 5.1 mmol/L Final     Comment:     Anion Gap reference range revised on 4/28/2023     Chloride   Date Value Ref Range Status   01/15/2025 107 95 - 110 mmol/L Final     CO2   Date Value Ref Range Status   01/15/2025 27 23 - " 29 mmol/L Final     Glucose   Date Value Ref Range Status   01/15/2025 99 70 - 110 mg/dL Final     Comment:     The ADA recommends the following guidelines for fasting glucose:    Normal:       less than 100 mg/dL    Prediabetes:  100 mg/dL to 125 mg/dL    Diabetes:     126 mg/dL or higher       BUN   Date Value Ref Range Status   01/15/2025 11 8 - 23 mg/dL Final     Creatinine   Date Value Ref Range Status   01/15/2025 0.72 0.50 - 1.40 mg/dL Final     Calcium   Date Value Ref Range Status   01/15/2025 9.2 8.7 - 10.5 mg/dL Final     Total Protein   Date Value Ref Range Status   01/15/2025 7.4 6.0 - 8.4 g/dL Final     Albumin   Date Value Ref Range Status   01/15/2025 3.3 (L) 3.5 - 5.2 g/dL Final     Total Bilirubin   Date Value Ref Range Status   01/15/2025 0.5 0.2 - 1.0 mg/dL Final     Alkaline Phosphatase   Date Value Ref Range Status   01/15/2025 114 40 - 150 U/L Final     AST   Date Value Ref Range Status   01/15/2025 18 10 - 40 U/L Final     ALT   Date Value Ref Range Status   01/15/2025 <7 (L) 10 - 44 U/L Final     Anion Gap   Date Value Ref Range Status   01/15/2025 9 8 - 16 mmol/L Final     Comment:     Anion Gap reference range revised on 4/28/2023     eGFR   Date Value Ref Range Status   01/15/2025 >60 >60 mL/min/1.73 m^2 Final       Imaging:  Xray  -    CT  9/2024  FINDINGS:  Mineralization: Mild osteopenia is seen of the visualized bony structures.Congenital: None.Bone alignment: Unremarkable with no significant listhesis.Curvature: There is straightening of the lumbar lordotic curvature.Bone and bone marrow: The vertebral body heights are maintained.Intervertebral disc spaces: There are interbody disc spacers seen in L4-L5 and L5-S1 levels; otherwise the rest of the intervertebral disc spaces are unremarkable.Osteophytes: Mild multilevel osteophytes are seen.Facet degenerative changes: Moderate multilevel facet degenerative changes are seen.Spinal canal: No bony spinal canal stenosis  identified.Fractures: There is an acute minimally displaced fracture involving the left transverse process of L2 seen on series 4 image 95.Orthopedic Hardware: Interbody fusion hardware is present at L4-L5 through L5-S1. Intervertebral disc prosthesis hardware is present at L4-L5 and L5-S1.Miscellaneous: Incidental note of aorto-biiliac stent and biliary stent. There are multiple diverticula in the sigmoid colon without associated fat stranding to suggest diverticulitis.     Impression:     1. There is an acute minimally displaced fracture involving the left transverse process of L2.    MRI  9/2024  FINDINGS:  NOMENCLATURE: Five lumbar type vertebral bodies.     CORD/CAUDA EQUINA: Conus has normal size and signal and ends at a normal level of L1-L2.     ALIGNMENT: Normal.     BONES: Vertebral body heights are maintained.  Abnormal signal corresponding to the left L2 transverse process fracture seen on CT from 09/18/2024.  L4-5 and L5-S1 anterior fusion hardware and left L4-S1 posterior fusion hardware.  L2 vertebral body hemangioma.     PARASPINAL AREA: Minor bilateral lower lumbar dorsal paraspinal muscular STIR signal hyperintensity.  Mild STIR signal hyperintensity surrounding the left L2 transverse process fracture.     LUMBAR DISC LEVELS:     T12-L1: No disc herniation or significant posterior osteophytic ridging.  No significant spinal canal or foraminal stenosis.     L1-L2: Minimal disc bulge.  Slight loss of normal signal in the disc.  Mild left greater than right facet hypertrophy.  Ligamentum flavum thickening.  No significant spinal canal stenosis.  Mild bilateral foraminal stenosis.     L2-L3: Mild disc bulge.  Mild-moderate bilateral facet hypertrophy.  Ligamentum flavum thickening.  Mild right and minimal left narrowing of the lateral recesses.  No significant spinal canal stenosis.  Mild-moderate left and mild right foraminal stenosis.     L3-L4: Mild disc bulge.  Moderate bilateral facet hypertrophy.   Ligamentum flavum thickening.  Mild left and minimal right narrowing of the lateral recesses.  No significant spinal canal stenosis.  Mild-moderate left and mild right foraminal stenosis.     L4-L5: Fused.  No significant spinal canal stenosis.  Mild bilateral foraminal stenosis.     L5-S1: Fused.  Central posterior osseous ridging.  Minimal narrowing of the right lateral recess.  No significant spinal canal stenosis.  Mild bilateral foraminal stenosis.     Impression:     1. [employer] concordant.  Acute or subacute left L2 transverse process fracture.  2. Multilevel spondylosis without significant spinal canal stenosis.  3. Multilevel foraminal stenosis, greatest on the left at L2-3 and L3-4 where it is mild-moderate.    Electrodiagnostics:      ASSESSMENT/PLAN:   Problem List Items Addressed This Visit    None      64 y.o. year old male with hypertension, hyperlipidemia, BPH, coronary artery disease status post CABG x4, aortic aneurysm status post repair July 2024, cholecystectomy complicated by gallbladder necrosis requiring biliary stent and ERCP,  who presents with a chief complaint of lumbar back pain on the left.    Last seen 11/2024. Plan was for him to return in 2 months to discuss whether continuing duloxetine was necessary since his pain improved on methadone. He says that he still has a little bit of side pain. He is doing work-up for prostate or colonoscopy but is considering repeat injections after those things have been addressed.    Follow-up 3 months to discuss repeat SI vs epidural    1. Multifactorial back pain, improved  2. Lumbar myofascial pain  3. Sacroiliac joint dysfunction bilateral left more than right, improved  4. Status post L4-5 and L5-S1 fusion   5. Chronic opioid use on methadone maintenance therapy    -- Procedures:  None today  -- Diagnostics and Imaging:  None today  -- Medications:    -- Rescue:  Tizanidine 4 mg prescribed by an other provider   -- Daily:  Continue duloxetine 30  "and gabapentin 800 mg QID   -- Topical:  Lidocaine 5% patches   -- TENS: Safe to use without any implanted devices / pacemakers / defibrillators. See instructional video: TENS unit  -- Physical therapy: Continue home exercise regimen.  Referral to PT for low back pain sacroiliac joint dysfunction  -- Pain Psychology: See free resources below.  Patient said he was not interested today  -- Future options:  Increase duloxetine, encourage physical therapy and pain psychology; repeat SI joint injection    Pain Psychology Resources:  -- "Harnessing the Power of your Thoughts for Pain Control" - [employer] [place] Back Pain Education Day 2016.   -- "Pain Catastrophizing" - [employer] [place] education Youtube channel  -- Free 8-week Mindfulness Course - [employer] Mindfulness-Based Stress Reduction  -- [id number] Free Sera for stress reduction developed by the [employer]     Follow Up: Return to clinic in 2 month(s)    PDMP: Methadone    Total time: 4 min    Level 3 by MDM    Hamlet Kang MD  Headache and Pain Management  [facility] [place], [place]        "

## 2025-02-20 ENCOUNTER — OFFICE VISIT (OUTPATIENT)
Dept: PAIN MEDICINE | Facility: CLINIC | Age: 65
End: 2025-02-20
Payer: MEDICARE

## 2025-02-20 VITALS — DIASTOLIC BLOOD PRESSURE: 80 MMHG | SYSTOLIC BLOOD PRESSURE: 130 MMHG

## 2025-02-20 DIAGNOSIS — M47.819 FACET ARTHROPATHY: ICD-10-CM

## 2025-02-20 DIAGNOSIS — M53.3 SACROILIAC JOINT DYSFUNCTION: Primary | ICD-10-CM

## 2025-02-20 DIAGNOSIS — F11.90 OPIOID USE DISORDER: ICD-10-CM

## 2025-02-20 RX ORDER — GABAPENTIN 800 MG/1
800 TABLET ORAL 4 TIMES DAILY
Qty: 120 TABLET | Refills: 11 | Status: SHIPPED | OUTPATIENT
Start: 2025-02-20

## (undated) DEVICE — NDL 18GA X1 1/2 REG BEVEL

## (undated) DEVICE — SEE MEDLINE ITEM 152622

## (undated) DEVICE — TRAY NERVE BLOCK

## (undated) DEVICE — GLOVE SURGICAL LATEX SZ 7

## (undated) DEVICE — MARKER SKIN STND TIP BLUE BARR

## (undated) DEVICE — APPLICATOR CHLORAPREP CLR 10.5

## (undated) DEVICE — SOL SOD CHLORIDE 0.9% 10ML

## (undated) DEVICE — CANNULA CVD 100MM X 20G

## (undated) DEVICE — PAD ELECTROSURGICAL PAT PLATE

## (undated) DEVICE — NDL SPINAL SPINOCAN 22GX3.5